# Patient Record
Sex: FEMALE | Race: WHITE | HISPANIC OR LATINO | Employment: FULL TIME | URBAN - METROPOLITAN AREA
[De-identification: names, ages, dates, MRNs, and addresses within clinical notes are randomized per-mention and may not be internally consistent; named-entity substitution may affect disease eponyms.]

---

## 2018-01-20 ENCOUNTER — HOSPITAL ENCOUNTER (INPATIENT)
Facility: HOSPITAL | Age: 48
LOS: 3 days | Discharge: HOME | DRG: 690 | End: 2018-01-23
Attending: EMERGENCY MEDICINE | Admitting: INTERNAL MEDICINE
Payer: COMMERCIAL

## 2018-01-20 ENCOUNTER — APPOINTMENT (EMERGENCY)
Dept: RADIOLOGY | Facility: HOSPITAL | Age: 48
DRG: 690 | End: 2018-01-20
Payer: COMMERCIAL

## 2018-01-20 DIAGNOSIS — Q60.0 SOLITARY KIDNEY, CONGENITAL: ICD-10-CM

## 2018-01-20 DIAGNOSIS — N39.0 UTI (URINARY TRACT INFECTION): ICD-10-CM

## 2018-01-20 DIAGNOSIS — N13.2 URETERAL STONE WITH HYDRONEPHROSIS: ICD-10-CM

## 2018-01-20 DIAGNOSIS — N10 PYELONEPHRITIS, ACUTE: Primary | ICD-10-CM

## 2018-01-20 LAB
ALBUMIN SERPL BCP-MCNC: 3.9 G/DL (ref 3.5–5)
ALP SERPL-CCNC: 71 U/L (ref 46–116)
ALT SERPL W P-5'-P-CCNC: 26 U/L (ref 12–78)
ANION GAP SERPL CALCULATED.3IONS-SCNC: 5 MMOL/L (ref 4–13)
AST SERPL W P-5'-P-CCNC: 11 U/L (ref 5–45)
BACTERIA UR QL AUTO: ABNORMAL /HPF
BASOPHILS # BLD AUTO: 0.03 THOUSANDS/ΜL (ref 0–0.1)
BASOPHILS NFR BLD AUTO: 0 % (ref 0–1)
BILIRUB SERPL-MCNC: 0.26 MG/DL (ref 0.2–1)
BILIRUB UR QL STRIP: NEGATIVE
BUN SERPL-MCNC: 15 MG/DL (ref 5–25)
CALCIUM SERPL-MCNC: 8.9 MG/DL (ref 8.3–10.1)
CHLORIDE SERPL-SCNC: 107 MMOL/L (ref 100–108)
CLARITY UR: CLEAR
CO2 SERPL-SCNC: 26 MMOL/L (ref 21–32)
COLOR UR: YELLOW
COLOR, POC: NORMAL
CREAT SERPL-MCNC: 0.84 MG/DL (ref 0.6–1.3)
EOSINOPHIL # BLD AUTO: 0.5 THOUSAND/ΜL (ref 0–0.61)
EOSINOPHIL NFR BLD AUTO: 4 % (ref 0–6)
ERYTHROCYTE [DISTWIDTH] IN BLOOD BY AUTOMATED COUNT: 13.4 % (ref 11.6–15.1)
GFR SERPL CREATININE-BSD FRML MDRD: 83 ML/MIN/1.73SQ M
GLUCOSE SERPL-MCNC: 106 MG/DL (ref 65–140)
GLUCOSE UR STRIP-MCNC: NEGATIVE MG/DL
HCT VFR BLD AUTO: 42 % (ref 34.8–46.1)
HGB BLD-MCNC: 14.4 G/DL (ref 11.5–15.4)
HGB UR QL STRIP.AUTO: ABNORMAL
HYALINE CASTS #/AREA URNS LPF: ABNORMAL /LPF
KETONES UR STRIP-MCNC: NEGATIVE MG/DL
LEUKOCYTE ESTERASE UR QL STRIP: ABNORMAL
LYMPHOCYTES # BLD AUTO: 1.45 THOUSANDS/ΜL (ref 0.6–4.47)
LYMPHOCYTES NFR BLD AUTO: 12 % (ref 14–44)
MCH RBC QN AUTO: 28.6 PG (ref 26.8–34.3)
MCHC RBC AUTO-ENTMCNC: 34.3 G/DL (ref 31.4–37.4)
MCV RBC AUTO: 83 FL (ref 82–98)
MONOCYTES # BLD AUTO: 0.59 THOUSAND/ΜL (ref 0.17–1.22)
MONOCYTES NFR BLD AUTO: 5 % (ref 4–12)
NEUTROPHILS # BLD AUTO: 9.37 THOUSANDS/ΜL (ref 1.85–7.62)
NEUTS SEG NFR BLD AUTO: 79 % (ref 43–75)
NITRITE UR QL STRIP: NEGATIVE
NON-SQ EPI CELLS URNS QL MICRO: ABNORMAL /HPF
NRBC BLD AUTO-RTO: 0 /100 WBCS
PH UR STRIP.AUTO: 6.5 [PH] (ref 4.5–8)
PLATELET # BLD AUTO: 263 THOUSANDS/UL (ref 149–390)
PMV BLD AUTO: 8.8 FL (ref 8.9–12.7)
POTASSIUM SERPL-SCNC: 4.1 MMOL/L (ref 3.5–5.3)
PROT SERPL-MCNC: 7.7 G/DL (ref 6.4–8.2)
PROT UR STRIP-MCNC: >=300 MG/DL
RBC # BLD AUTO: 5.04 MILLION/UL (ref 3.81–5.12)
RBC #/AREA URNS AUTO: ABNORMAL /HPF
SODIUM SERPL-SCNC: 138 MMOL/L (ref 136–145)
SP GR UR STRIP.AUTO: >=1.03 (ref 1–1.03)
UROBILINOGEN UR QL STRIP.AUTO: 0.2 E.U./DL
WBC # BLD AUTO: 11.97 THOUSAND/UL (ref 4.31–10.16)
WBC #/AREA URNS AUTO: ABNORMAL /HPF

## 2018-01-20 PROCEDURE — 87086 URINE CULTURE/COLONY COUNT: CPT

## 2018-01-20 PROCEDURE — 96361 HYDRATE IV INFUSION ADD-ON: CPT

## 2018-01-20 PROCEDURE — 87186 SC STD MICRODIL/AGAR DIL: CPT

## 2018-01-20 PROCEDURE — 85025 COMPLETE CBC W/AUTO DIFF WBC: CPT | Performed by: EMERGENCY MEDICINE

## 2018-01-20 PROCEDURE — 99285 EMERGENCY DEPT VISIT HI MDM: CPT

## 2018-01-20 PROCEDURE — 96365 THER/PROPH/DIAG IV INF INIT: CPT

## 2018-01-20 PROCEDURE — 74176 CT ABD & PELVIS W/O CONTRAST: CPT

## 2018-01-20 PROCEDURE — 96375 TX/PRO/DX INJ NEW DRUG ADDON: CPT

## 2018-01-20 PROCEDURE — 80053 COMPREHEN METABOLIC PANEL: CPT | Performed by: EMERGENCY MEDICINE

## 2018-01-20 PROCEDURE — 81001 URINALYSIS AUTO W/SCOPE: CPT

## 2018-01-20 PROCEDURE — 81002 URINALYSIS NONAUTO W/O SCOPE: CPT | Performed by: EMERGENCY MEDICINE

## 2018-01-20 PROCEDURE — 36415 COLL VENOUS BLD VENIPUNCTURE: CPT | Performed by: EMERGENCY MEDICINE

## 2018-01-20 PROCEDURE — 87077 CULTURE AEROBIC IDENTIFY: CPT

## 2018-01-20 RX ORDER — TAMSULOSIN HYDROCHLORIDE 0.4 MG/1
0.4 CAPSULE ORAL
Status: DISCONTINUED | OUTPATIENT
Start: 2018-01-21 | End: 2018-01-23 | Stop reason: HOSPADM

## 2018-01-20 RX ORDER — MORPHINE SULFATE 2 MG/ML
2 INJECTION, SOLUTION INTRAMUSCULAR; INTRAVENOUS
Status: DISCONTINUED | OUTPATIENT
Start: 2018-01-20 | End: 2018-01-20 | Stop reason: DRUGHIGH

## 2018-01-20 RX ORDER — SODIUM CHLORIDE 9 MG/ML
125 INJECTION, SOLUTION INTRAVENOUS CONTINUOUS
Status: DISCONTINUED | OUTPATIENT
Start: 2018-01-20 | End: 2018-01-21 | Stop reason: SDUPTHER

## 2018-01-20 RX ORDER — ONDANSETRON 2 MG/ML
4 INJECTION INTRAMUSCULAR; INTRAVENOUS ONCE
Status: COMPLETED | OUTPATIENT
Start: 2018-01-20 | End: 2018-01-20

## 2018-01-20 RX ORDER — ONDANSETRON 2 MG/ML
4 INJECTION INTRAMUSCULAR; INTRAVENOUS ONCE AS NEEDED
Status: DISCONTINUED | OUTPATIENT
Start: 2018-01-20 | End: 2018-01-23 | Stop reason: HOSPADM

## 2018-01-20 RX ORDER — ACETAMINOPHEN 325 MG/1
650 TABLET ORAL ONCE
Status: COMPLETED | OUTPATIENT
Start: 2018-01-20 | End: 2018-01-20

## 2018-01-20 RX ORDER — MORPHINE SULFATE 2 MG/ML
2 INJECTION, SOLUTION INTRAMUSCULAR; INTRAVENOUS
Status: DISCONTINUED | OUTPATIENT
Start: 2018-01-20 | End: 2018-01-23

## 2018-01-20 RX ORDER — ACETAMINOPHEN 325 MG/1
650 TABLET ORAL EVERY 6 HOURS PRN
Status: DISCONTINUED | OUTPATIENT
Start: 2018-01-20 | End: 2018-01-23 | Stop reason: HOSPADM

## 2018-01-20 RX ORDER — SODIUM CHLORIDE 9 MG/ML
125 INJECTION, SOLUTION INTRAVENOUS CONTINUOUS
Status: DISCONTINUED | OUTPATIENT
Start: 2018-01-20 | End: 2018-01-20 | Stop reason: SDUPTHER

## 2018-01-20 RX ORDER — ONDANSETRON 2 MG/ML
4 INJECTION INTRAMUSCULAR; INTRAVENOUS EVERY 6 HOURS PRN
Status: DISCONTINUED | OUTPATIENT
Start: 2018-01-20 | End: 2018-01-23 | Stop reason: HOSPADM

## 2018-01-20 RX ADMIN — CEFTRIAXONE SODIUM 1000 MG: 10 INJECTION, POWDER, FOR SOLUTION INTRAVENOUS at 17:45

## 2018-01-20 RX ADMIN — SODIUM CHLORIDE 1000 ML: 0.9 INJECTION, SOLUTION INTRAVENOUS at 16:55

## 2018-01-20 RX ADMIN — SODIUM CHLORIDE 125 ML/HR: 0.9 INJECTION, SOLUTION INTRAVENOUS at 21:59

## 2018-01-20 RX ADMIN — ONDANSETRON 4 MG: 2 INJECTION INTRAMUSCULAR; INTRAVENOUS at 18:03

## 2018-01-20 RX ADMIN — ACETAMINOPHEN 650 MG: 325 TABLET, FILM COATED ORAL at 18:02

## 2018-01-20 NOTE — ASSESSMENT & PLAN NOTE
CT abd showed calculi within the distal right ureter, largest measuring 3 mm resulting in marked hydroureter and hydronephrosis  Urology consult   Aggressive IVF/Pain control/supportive care    NPO for now while waiting for uro input

## 2018-01-20 NOTE — H&P
H&P- Lazarus Hooker 1970, 52 y o  female MRN: [id number]    Unit/Bed#: ED 24 Encounter: [id number]    Primary Care Provider: Ashley Layton MD   Date and time admitted to hospital: 1/20/2018  4:21 PM        * Ureteral stone with hydronephrosis   Assessment & Plan    CT abd showed calculi within the distal right ureter, largest measuring 3 mm resulting in marked hydroureter and hydronephrosis  Urology consult   Aggressive IVF/Pain control/supportive care  NPO for now while waiting for uro input        UTI (urinary tract infection)   Assessment & Plan    With pyuria and bacteruria and urgency  Received  IV ceftriaxone and will continue        Solitary kidney, congenital   Assessment & Plan    Cr normal          VTE Prophylaxis: Pharmacologic VTE Prophylaxis contraindicated due to Pending  decision regarding plan for OR  / sequential compression device   Code Status: Full code  POLST: POLST form is not discussed and not completed at this time  Discussion with family: Family at bedside    Anticipated Length of Stay:  Patient will be admitted on an Inpatient basis with an anticipated length of stay of  > 2 midnights  Justification for Hospital Stay: Above    Total Time for Visit, including Counseling / Coordination of Care: 20 minutes  Greater than 50% of this total time spent on direct patient counseling and coordination of care  Chief Complaint:   Right flank pain    History of Present Illness:    Lazarus Hooker is a 52 y o  female with congenital solitary kidney/absent left kidney who presents with chills and right flank pain  She started noticing urinary urgency yesterday  Today she started  having severe sharp pain right flank area radiating down to right lower abdomen and groin area, associated with nausea and chills  In the ED, CT abdomen showed distal right ureteral stone largest measuring 3 mm with associated marked hydroureter and hydronephrosis    Urine positive for pyuria and bacteriuria  Review of Systems:    Review of Systems   Constitutional: Positive for appetite change, chills and fatigue  Respiratory: Negative for cough, choking, shortness of breath and wheezing  Cardiovascular: Negative for palpitations and leg swelling  Gastrointestinal: Positive for abdominal pain  Genitourinary: Positive for urgency  Past Medical and Surgical History:     Past Medical History:   Diagnosis Date    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       Past Surgical History:   Procedure Laterality Date    HYSTERECTOMY      KNEE SURGERY         Meds/Allergies:    Prior to Admission medications    Not on File     I have reviewed home medications with patient personally  Allergies: Allergies   Allergen Reactions    Metoclopramide     Nitrofurantoin     Diphenhydramine Palpitations       Social History:     Marital Status:   Patient Pre-hospital Living Situation: Home  Patient Pre-hospital Level of Mobility: Ambulatory    History   Alcohol Use No     History   Smoking Status    Never Smoker   Smokeless Tobacco    Never Used     History   Drug Use No       Family History:    History reviewed  No pertinent family history  Physical Exam:     Vitals:   Blood Pressure: 128/74 (01/20/18 1749)  Pulse: 69 (01/20/18 1749)  Temperature: 98 °F (36 7 °C) (01/20/18 1412)  Temp Source: Oral (01/20/18 1412)  Respirations: 18 (01/20/18 1749)  Height: 4' 11" (149 9 cm) (01/20/18 1412)  Weight - Scale: 77 6 kg (171 lb) (01/20/18 1412)  SpO2: 100 % (01/20/18 1749)    Physical Exam   Constitutional: She is oriented to person, place, and time  Mild pain distress   Eyes: Conjunctivae and EOM are normal  Pupils are equal, round, and reactive to light  Neck: Normal range of motion  Neck supple  No JVD present  Abdominal: Soft  Bowel sounds are normal  She exhibits no distension  There is no rebound and no guarding     CVA tenderness right side   Musculoskeletal: Normal range of motion  She exhibits no edema  Neurological: She is alert and oriented to person, place, and time  Skin: Skin is warm and dry  She is not diaphoretic  No erythema  Psychiatric: She has a normal mood and affect  (Additional Data:     Lab Results:       Results from last 7 days  Lab Units 01/20/18  1656   WBC Thousand/uL 11 97*   HEMOGLOBIN g/dL 14 4   HEMATOCRIT % 42 0   PLATELETS Thousands/uL 263   NEUTROS PCT % 79*   LYMPHS PCT % 12*   MONOS PCT % 5   EOS PCT % 4       Results from last 7 days  Lab Units 01/20/18  1656   SODIUM mmol/L 138   POTASSIUM mmol/L 4 1   CHLORIDE mmol/L 107   CO2 mmol/L 26   BUN mg/dL 15   CREATININE mg/dL 0 84   CALCIUM mg/dL 8 9   TOTAL PROTEIN g/dL 7 7   BILIRUBIN TOTAL mg/dL 0 26   ALK PHOS U/L 71   ALT U/L 26   AST U/L 11   GLUCOSE RANDOM mg/dL 106           Imaging:     CT renal stone study abdomen pelvis wo contrast   Final Result by Meg Cisneros MD (01/20 1822)         1  Calculi within the distal right ureter, largest measuring 3 mm resulting in marked hydroureter and hydronephrosis  Multiple intrarenal calculi also present, largest measuring 4 mm and located in the lower pole  2  Congenitally absent left kidney  I personally discussed this study with Mack Alvarez on 1/20/2018 6:19 PM          Workstation performed: [id number]               ** Please Note: This note has been constructed using a voice recognition system   **

## 2018-01-20 NOTE — ED ATTENDING ATTESTATION
Therese Cao MD, saw and evaluated the patient  I have discussed the patient with the resident/non-physician practitioner and agree with the resident's/non-physician practitioner's findings, Plan of Care, and MDM as documented in the resident's/non-physician practitioner's note, except where noted  All available labs and Radiology studies were reviewed  At this point I agree with the current assessment done in the Emergency Department  I have conducted an independent evaluation of this patient a history and physical is as follows:      Critical Care Time  CritCare Time   OA: 51 y/o f with h/o one kidney since birth who presents with R flank pain with associated urinary frequency and darker colored urine  Over the past day, she also developed n/v and pelvic/flank pain  Denies fevers or chills  No change in stools  No chest pain or shortness of breath  Patient does have a surgical history of complete hysterectomy  She has a history of previous UTIs but none recently  She cannot remember she has a history of renal stones  On physical exam patient is well appearing but mildly uncomfortable  Vital signs are stable  HEENT is normocephalic and atraumatic  Anicteric sclera and conjunctiva  Neck is supple  Heart is regular rate  Lungs are clear  Abdomen is soft with positive bowel sounds patient has very mild tenderness to palpation in right upper quadrant as well as right flank  There is no right lower quadrant tenderness to palpation on my exam   There is no rebound no guarding or mass  No lower extremity edema or calf tenderness to palpation  Intact distal pulses  Capillary refill less than 2 seconds  Awake oriented x3  Assessment and plan concern for pyelonephritis may also have kidney stone  Will obtain noncontrast CT  Will obtain basic blood work and urinalysis    If any signs of infection or will start IV fluid hydration as well as IV antibiotics is patient is complicated UTI given her history of congenital 1 kidney  Portions of the record may have been created with voice recognition software  Occasional wrong word or sound-a-like" substitutions may have occurred due to the inherent limitations of voice recognition software  Review chart carefully and recognize, using context, where substitutions have occurred      Procedures

## 2018-01-20 NOTE — ED PROVIDER NOTES
History  Chief Complaint   Patient presents with    Flank Pain     Patient reports she has 1 right kidney, states she started with right flank pain that radiates down her right side starting this morning  HPI  24-year-old female with medical history significant for left renal agenesis and history of kidney stones presents to the emergency department with complaint of right-sided flank pain  Patient states that she began having sharp, stabbing right flank pain with radiation to her right groin this morning  Pain is been intermittent without known modifying factors  Patient denies having had similar pain in the past   On review of systems, she states that she has had dark urine and urinary urgency over the past week  She also reports having had subjective fevers and chills as well as mild nausea without vomiting this morning  She denies any headache, lightheadedness, neck stiffness, chest pain, shortness of breath, abdominal pain, hematuria, dysuria, or complaints other than stated above  Patient does report history of recurrent pyelonephritis many years ago, but states that she typically has obvious urinary symptoms that she does not currently have  None       Past Medical History:   Diagnosis Date    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       Past Surgical History:   Procedure Laterality Date    HYSTERECTOMY      KNEE SURGERY         History reviewed  No pertinent family history  I have reviewed and agree with the history as documented  Social History   Substance Use Topics    Smoking status: Never Smoker    Smokeless tobacco: Never Used    Alcohol use No        Review of Systems   Constitutional: Positive for chills and fever  Respiratory: Negative for shortness of breath  Gastrointestinal: Positive for nausea  Negative for abdominal pain and vomiting  Genitourinary: Positive for flank pain and urgency  Negative for dysuria and hematuria     Musculoskeletal: Negative for arthralgias and joint swelling  Skin: Negative for rash and wound  Allergic/Immunologic: Negative for immunocompromised state  Neurological: Negative for headaches  Psychiatric/Behavioral: The patient is not nervous/anxious  All other systems reviewed and are negative  Physical Exam  ED Triage Vitals [01/20/18 1412]   Temperature Pulse Respirations Blood Pressure SpO2   98 °F (36 7 °C) 95 18 123/89 100 %      Temp Source Heart Rate Source Patient Position - Orthostatic VS BP Location FiO2 (%)   Oral Monitor Sitting Left arm --      Pain Score       9           Orthostatic Vital Signs  Vitals:    01/20/18 1900 01/20/18 2034 01/20/18 2120 01/20/18 2317   BP: 118/56 104/55 124/79 98/53   Pulse: 72 75 86 82   Patient Position - Orthostatic VS:  Lying Sitting Lying       Physical Exam   Constitutional: She is oriented to person, place, and time  She appears well-nourished  No distress  HENT:   Head: Normocephalic and atraumatic  Eyes: EOM are normal    Neck: Normal range of motion  Neck supple  Cardiovascular: Normal rate and regular rhythm  Pulmonary/Chest: Effort normal and breath sounds normal  No respiratory distress  Abdominal: Soft  She exhibits no distension  There is no tenderness  There is CVA tenderness (right)  Musculoskeletal: Normal range of motion  Neurological: She is alert and oriented to person, place, and time  Skin: Skin is warm and dry  She is not diaphoretic  Psychiatric: She has a normal mood and affect  Her behavior is normal    Nursing note and vitals reviewed        ED Medications  Medications   ondansetron (ZOFRAN) injection 4 mg (not administered)   sodium chloride 0 9 % infusion (125 mL/hr Intravenous New Bag 1/20/18 7463)   ondansetron (ZOFRAN) injection 4 mg (not administered)   cefTRIAXone (ROCEPHIN) 1,000 mg in dextrose 5 % 50 mL IVPB (not administered)   morphine injection 2 mg (not administered)   acetaminophen (TYLENOL) tablet 650 mg (not administered)   tamsulosin (FLOMAX) capsule 0 4 mg (not administered)   sodium chloride 0 9 % bolus 1,000 mL (0 mL Intravenous Stopped 1/20/18 1855)   ceftriaxone (ROCEPHIN) 1 g/50 mL in dextrose IVPB (0 mg Intravenous Stopped 1/20/18 1845)   acetaminophen (TYLENOL) tablet 650 mg (650 mg Oral Given 1/20/18 1802)   ondansetron (ZOFRAN) injection 4 mg (4 mg Intravenous Given 1/20/18 1803)       Diagnostic Studies  Results Reviewed     Procedure Component Value Units Date/Time    Comprehensive metabolic panel [[id number]] Collected:  01/20/18 1656    Lab Status:  Final result Specimen:  Blood from Arm, Left Updated:  01/20/18 1721     Sodium 138 mmol/L      Potassium 4 1 mmol/L      Chloride 107 mmol/L      CO2 26 mmol/L      Anion Gap 5 mmol/L      BUN 15 mg/dL      Creatinine 0 84 mg/dL      Glucose 106 mg/dL      Calcium 8 9 mg/dL      AST 11 U/L      ALT 26 U/L      Alkaline Phosphatase 71 U/L      Total Protein 7 7 g/dL      Albumin 3 9 g/dL      Total Bilirubin 0 26 mg/dL      eGFR 83 ml/min/1 73sq m     Narrative:         National Kidney Disease Education Program recommendations are as follows:  GFR calculation is accurate only with a steady state creatinine  Chronic Kidney disease less than 60 ml/min/1 73 sq  meters  Kidney failure less than 15 ml/min/1 73 sq  meters      CBC and differential [[id number]]  (Abnormal) Collected:  01/20/18 1656    Lab Status:  Final result Specimen:  Blood from Arm, Left Updated:  01/20/18 1710     WBC 11 97 (H) Thousand/uL      RBC 5 04 Million/uL      Hemoglobin 14 4 g/dL      Hematocrit 42 0 %      MCV 83 fL      MCH 28 6 pg      MCHC 34 3 g/dL      RDW 13 4 %      MPV 8 8 (L) fL      Platelets 702 Thousands/uL      nRBC 0 /100 WBCs      Neutrophils Relative 79 (H) %      Lymphocytes Relative 12 (L) %      Monocytes Relative 5 %      Eosinophils Relative 4 %      Basophils Relative 0 %      Neutrophils Absolute 9 37 (H) Thousands/µL      Lymphocytes Absolute 1 45 Thousands/µL Monocytes Absolute 0 59 Thousand/µL      Eosinophils Absolute 0 50 Thousand/µL      Basophils Absolute 0 03 Thousands/µL     POCT urinalysis dipstick [[id number]]  (Normal) Resulted:  01/20/18 1706    Lab Status:  Final result Updated:  01/20/18 1706     Color, UA see chart from triage    Urine Microscopic [[id number]]  (Abnormal) Collected:  01/20/18 1457    Lab Status:  Final result Specimen:  Urine from Urine, Clean Catch Updated:  01/20/18 1508     RBC, UA Innumerable (A) /hpf      WBC, UA Innumerable (A) /hpf      Epithelial Cells None Seen /hpf      Bacteria, UA Innumerable (A) /hpf      Hyaline Casts, UA 25-50 (A) /lpf     Urine culture [[id number]] Collected:  01/20/18 1457    Lab Status: In process Specimen:  Urine from Urine, Clean Catch Updated:  01/20/18 1508    ED Urine Macroscopic [[id number]]  (Abnormal) Collected:  01/20/18 1457    Lab Status:  Final result Specimen:  Urine Updated:  01/20/18 1454     Color, UA Yellow     Clarity, UA Clear     pH, UA 6 5     Leukocytes, UA Moderate (A)     Nitrite, UA Negative     Protein, UA >=300 (A) mg/dl      Glucose, UA Negative mg/dl      Ketones, UA Negative mg/dl      Urobilinogen, UA 0 2 E U /dl      Bilirubin, UA Negative     Blood, UA Moderate (A)     Specific Gravity, UA >=1 030    Narrative:       CLINITEK RESULT    POCT pregnancy, urine [[id number]]     Lab Status:  No result                  CT renal stone study abdomen pelvis wo contrast   Final Result by Zelalem Engel MD (01/20 1822)         1  Calculi within the distal right ureter, largest measuring 3 mm resulting in marked hydroureter and hydronephrosis  Multiple intrarenal calculi also present, largest measuring 4 mm and located in the lower pole  2  Congenitally absent left kidney                   I personally discussed this study with Morris Moreno on 1/20/2018 6:19 PM          Workstation performed: [id number]               Procedures  Procedures      Phone Consults  ED Phone Contact    ED Course  ED Course as of Jan 21 0154   Sat Jan 20, 2018   1830 On the line with Urology - keep NPO  Possible OR tomorrow  Call overnight for any new/worsening symptoms  Patient aware of all results and plans  MDM  Number of Diagnoses or Management Options  Pyelonephritis, acute:   Ureteral stone with hydronephrosis:   UTI (urinary tract infection):   Diagnosis management comments: 42-year-old female with 1 kidney presenting with right flank pain  UA positive for bacteria, nitrate  Will obtain CBC, BMP, stone study  Plan for admission for IV antibiotics  Update: Patient has obstructive stone  Urology consulted       CritCare Time    Disposition  Final diagnoses:   Ureteral stone with hydronephrosis   UTI (urinary tract infection)   Pyelonephritis, acute     Time reflects when diagnosis was documented in both MDM as applicable and the Disposition within this note     Time User Action Codes Description Comment    1/20/2018  6:50 PM Willma Citrin Add [N13 2] Ureteral stone with hydronephrosis     1/20/2018  6:50 PM Zwmaitebel Rebeca Modify [N13 2] Ureteral stone with hydronephrosis     1/20/2018  6:50 PM Zwmaitebel Rebeca Modify [N13 2] Ureteral stone with hydronephrosis     1/20/2018  6:50 PM Zwmaitebel Rebeca Add [N39 0] UTI (urinary tract infection)     1/20/2018  6:50 PM Zwmaitebel Rebeca Modify [N39 0] UTI (urinary tract infection)     1/20/2018  6:51 PM Zwmaitebel Rebeca Add [N10] Pyelonephritis, acute     1/20/2018  6:51 PM Himabel Rebeca Modify [N13 2] Ureteral stone with hydronephrosis     1/20/2018  6:51 PM Zwmaitebel Rebeca Modify [N10] Pyelonephritis, acute     1/20/2018  6:52 PM Tali [place] Add [Q60 0] Solitary kidney, congenital     1/20/2018  6:52 PM Tali [place] Modify [Q60 0] Solitary kidney, congenital       ED Disposition     ED Disposition Condition Comment    Admit  Case was discussed with Dr Josr Spain and the patient's admission status was agreed to be Admission Status: inpatient status to the service of Dr Rebecca Bolanos   Follow-up Information    None       There are no discharge medications for this patient  No discharge procedures on file  ED Provider  Attending physically available and evaluated Raquel Conway I managed the patient along with the ED Attending      Electronically Signed by         Wilner Fulton MD  01/21/18 2034

## 2018-01-21 ENCOUNTER — ANESTHESIA EVENT (INPATIENT)
Dept: PERIOP | Facility: HOSPITAL | Age: 48
DRG: 690 | End: 2018-01-21
Payer: COMMERCIAL

## 2018-01-21 ENCOUNTER — APPOINTMENT (INPATIENT)
Dept: RADIOLOGY | Facility: HOSPITAL | Age: 48
DRG: 690 | End: 2018-01-21
Payer: COMMERCIAL

## 2018-01-21 ENCOUNTER — ANESTHESIA (INPATIENT)
Dept: PERIOP | Facility: HOSPITAL | Age: 48
DRG: 690 | End: 2018-01-21
Payer: COMMERCIAL

## 2018-01-21 LAB
ANION GAP SERPL CALCULATED.3IONS-SCNC: 6 MMOL/L (ref 4–13)
BUN SERPL-MCNC: 10 MG/DL (ref 5–25)
CALCIUM SERPL-MCNC: 8 MG/DL (ref 8.3–10.1)
CHLORIDE SERPL-SCNC: 113 MMOL/L (ref 100–108)
CO2 SERPL-SCNC: 24 MMOL/L (ref 21–32)
CREAT SERPL-MCNC: 0.79 MG/DL (ref 0.6–1.3)
ERYTHROCYTE [DISTWIDTH] IN BLOOD BY AUTOMATED COUNT: 13.3 % (ref 11.6–15.1)
GFR SERPL CREATININE-BSD FRML MDRD: 89 ML/MIN/1.73SQ M
GLUCOSE SERPL-MCNC: 89 MG/DL (ref 65–140)
GLUCOSE SERPL-MCNC: 97 MG/DL (ref 65–140)
HCT VFR BLD AUTO: 39.5 % (ref 34.8–46.1)
HGB BLD-MCNC: 13.1 G/DL (ref 11.5–15.4)
MCH RBC QN AUTO: 27.8 PG (ref 26.8–34.3)
MCHC RBC AUTO-ENTMCNC: 33.2 G/DL (ref 31.4–37.4)
MCV RBC AUTO: 84 FL (ref 82–98)
PLATELET # BLD AUTO: 247 THOUSANDS/UL (ref 149–390)
PMV BLD AUTO: 9 FL (ref 8.9–12.7)
POTASSIUM SERPL-SCNC: 3.6 MMOL/L (ref 3.5–5.3)
RBC # BLD AUTO: 4.71 MILLION/UL (ref 3.81–5.12)
SODIUM SERPL-SCNC: 143 MMOL/L (ref 136–145)
WBC # BLD AUTO: 8.29 THOUSAND/UL (ref 4.31–10.16)

## 2018-01-21 PROCEDURE — BT14YZZ FLUOROSCOPY OF KIDNEYS, URETERS AND BLADDER USING OTHER CONTRAST: ICD-10-PCS | Performed by: UROLOGY

## 2018-01-21 PROCEDURE — C2617 STENT, NON-COR, TEM W/O DEL: HCPCS | Performed by: UROLOGY

## 2018-01-21 PROCEDURE — 74420 UROGRAPHY RTRGR +-KUB: CPT

## 2018-01-21 PROCEDURE — 80048 BASIC METABOLIC PNL TOTAL CA: CPT | Performed by: INTERNAL MEDICINE

## 2018-01-21 PROCEDURE — 0T768DZ DILATION OF RIGHT URETER WITH INTRALUMINAL DEVICE, VIA NATURAL OR ARTIFICIAL OPENING ENDOSCOPIC: ICD-10-PCS | Performed by: UROLOGY

## 2018-01-21 PROCEDURE — C1769 GUIDE WIRE: HCPCS | Performed by: UROLOGY

## 2018-01-21 PROCEDURE — 82948 REAGENT STRIP/BLOOD GLUCOSE: CPT

## 2018-01-21 PROCEDURE — 85027 COMPLETE CBC AUTOMATED: CPT | Performed by: INTERNAL MEDICINE

## 2018-01-21 DEVICE — STENT URETERAL 6FR 24CML INLAY OPTIMA: Type: IMPLANTABLE DEVICE | Site: URETER | Status: FUNCTIONAL

## 2018-01-21 RX ORDER — PROPOFOL 10 MG/ML
INJECTION, EMULSION INTRAVENOUS AS NEEDED
Status: DISCONTINUED | OUTPATIENT
Start: 2018-01-21 | End: 2018-01-21 | Stop reason: SURG

## 2018-01-21 RX ORDER — KETOROLAC TROMETHAMINE 30 MG/ML
15 INJECTION, SOLUTION INTRAMUSCULAR; INTRAVENOUS EVERY 6 HOURS
Status: DISCONTINUED | OUTPATIENT
Start: 2018-01-21 | End: 2018-01-21

## 2018-01-21 RX ORDER — OXYCODONE HYDROCHLORIDE 5 MG/1
5 TABLET ORAL EVERY 4 HOURS PRN
Status: DISCONTINUED | OUTPATIENT
Start: 2018-01-21 | End: 2018-01-21

## 2018-01-21 RX ORDER — FENTANYL CITRATE 50 UG/ML
INJECTION, SOLUTION INTRAMUSCULAR; INTRAVENOUS AS NEEDED
Status: DISCONTINUED | OUTPATIENT
Start: 2018-01-21 | End: 2018-01-21 | Stop reason: SURG

## 2018-01-21 RX ORDER — MORPHINE SULFATE 2 MG/ML
2 INJECTION, SOLUTION INTRAMUSCULAR; INTRAVENOUS
Status: DISCONTINUED | OUTPATIENT
Start: 2018-01-21 | End: 2018-01-22

## 2018-01-21 RX ORDER — OXYCODONE HYDROCHLORIDE 5 MG/1
5 TABLET ORAL EVERY 4 HOURS PRN
Status: DISCONTINUED | OUTPATIENT
Start: 2018-01-21 | End: 2018-01-23

## 2018-01-21 RX ORDER — KETOROLAC TROMETHAMINE 30 MG/ML
INJECTION, SOLUTION INTRAMUSCULAR; INTRAVENOUS AS NEEDED
Status: DISCONTINUED | OUTPATIENT
Start: 2018-01-21 | End: 2018-01-21 | Stop reason: SURG

## 2018-01-21 RX ORDER — MAGNESIUM HYDROXIDE 1200 MG/15ML
LIQUID ORAL AS NEEDED
Status: DISCONTINUED | OUTPATIENT
Start: 2018-01-21 | End: 2018-01-21 | Stop reason: HOSPADM

## 2018-01-21 RX ORDER — PROMETHAZINE HYDROCHLORIDE 25 MG/ML
25 INJECTION, SOLUTION INTRAMUSCULAR; INTRAVENOUS ONCE AS NEEDED
Status: DISCONTINUED | OUTPATIENT
Start: 2018-01-21 | End: 2018-01-21 | Stop reason: HOSPADM

## 2018-01-21 RX ORDER — ONDANSETRON 2 MG/ML
INJECTION INTRAMUSCULAR; INTRAVENOUS AS NEEDED
Status: DISCONTINUED | OUTPATIENT
Start: 2018-01-21 | End: 2018-01-21 | Stop reason: SURG

## 2018-01-21 RX ORDER — MIDAZOLAM HYDROCHLORIDE 1 MG/ML
INJECTION INTRAMUSCULAR; INTRAVENOUS AS NEEDED
Status: DISCONTINUED | OUTPATIENT
Start: 2018-01-21 | End: 2018-01-21 | Stop reason: SURG

## 2018-01-21 RX ORDER — FENTANYL CITRATE/PF 50 MCG/ML
25 SYRINGE (ML) INJECTION AS NEEDED
Status: DISCONTINUED | OUTPATIENT
Start: 2018-01-21 | End: 2018-01-21 | Stop reason: HOSPADM

## 2018-01-21 RX ORDER — CIPROFLOXACIN 500 MG/1
500 TABLET, FILM COATED ORAL EVERY 12 HOURS SCHEDULED
Status: DISCONTINUED | OUTPATIENT
Start: 2018-01-21 | End: 2018-01-21

## 2018-01-21 RX ORDER — ONDANSETRON 2 MG/ML
4 INJECTION INTRAMUSCULAR; INTRAVENOUS ONCE AS NEEDED
Status: DISCONTINUED | OUTPATIENT
Start: 2018-01-21 | End: 2018-01-21 | Stop reason: HOSPADM

## 2018-01-21 RX ORDER — SODIUM CHLORIDE 9 MG/ML
100 INJECTION, SOLUTION INTRAVENOUS CONTINUOUS
Status: DISCONTINUED | OUTPATIENT
Start: 2018-01-21 | End: 2018-01-23 | Stop reason: HOSPADM

## 2018-01-21 RX ORDER — KETOROLAC TROMETHAMINE 30 MG/ML
15 INJECTION, SOLUTION INTRAMUSCULAR; INTRAVENOUS EVERY 6 HOURS SCHEDULED
Status: DISCONTINUED | OUTPATIENT
Start: 2018-01-21 | End: 2018-01-21

## 2018-01-21 RX ADMIN — KETOROLAC TROMETHAMINE 15 MG: 30 INJECTION, SOLUTION INTRAMUSCULAR at 14:43

## 2018-01-21 RX ADMIN — DEXAMETHASONE SODIUM PHOSPHATE 10 MG: 10 INJECTION INTRAMUSCULAR; INTRAVENOUS at 09:25

## 2018-01-21 RX ADMIN — MIDAZOLAM HYDROCHLORIDE 2 MG: 1 INJECTION, SOLUTION INTRAMUSCULAR; INTRAVENOUS at 09:18

## 2018-01-21 RX ADMIN — FENTANYL CITRATE 50 MCG: 50 INJECTION, SOLUTION INTRAMUSCULAR; INTRAVENOUS at 09:33

## 2018-01-21 RX ADMIN — ACETAMINOPHEN 650 MG: 325 TABLET, FILM COATED ORAL at 21:31

## 2018-01-21 RX ADMIN — CEFTRIAXONE SODIUM 1000 MG: 10 INJECTION, POWDER, FOR SOLUTION INTRAVENOUS at 09:18

## 2018-01-21 RX ADMIN — SODIUM CHLORIDE 50 ML/HR: 0.9 INJECTION, SOLUTION INTRAVENOUS at 13:20

## 2018-01-21 RX ADMIN — KETOROLAC TROMETHAMINE 30 MG: 30 INJECTION, SOLUTION INTRAMUSCULAR at 10:04

## 2018-01-21 RX ADMIN — SODIUM CHLORIDE 125 ML/HR: 0.9 INJECTION, SOLUTION INTRAVENOUS at 06:14

## 2018-01-21 RX ADMIN — CEFTRIAXONE SODIUM 1000 MG: 10 INJECTION, POWDER, FOR SOLUTION INTRAVENOUS at 17:15

## 2018-01-21 RX ADMIN — ACETAMINOPHEN 650 MG: 325 TABLET, FILM COATED ORAL at 14:41

## 2018-01-21 RX ADMIN — FENTANYL CITRATE 50 MCG: 50 INJECTION, SOLUTION INTRAMUSCULAR; INTRAVENOUS at 09:28

## 2018-01-21 RX ADMIN — PROPOFOL 200 MG: 10 INJECTION, EMULSION INTRAVENOUS at 09:23

## 2018-01-21 RX ADMIN — TAMSULOSIN HYDROCHLORIDE 0.4 MG: 0.4 CAPSULE ORAL at 17:09

## 2018-01-21 RX ADMIN — ONDANSETRON 4 MG: 2 INJECTION INTRAMUSCULAR; INTRAVENOUS at 10:04

## 2018-01-21 NOTE — PROGRESS NOTES
Progress Note - Amie Left 1970, 52 y o  female MRN: [id number]    Unit/Bed#: -01 Encounter: [id number]    Primary Care Provider: Margi Pate MD   Date and time admitted to hospital: 1/20/2018  4:21 PM        * Ureteral stone with hydronephrosis   Assessment & Plan    CT abd showed calculi within the distal right ureter, largest measuring 3 mm resulting in marked hydroureter and hydronephrosis  S/p ureteral stent placement  Labs reviewed unremarkable  Plan   C/w antimicrobial therapy  C/w pain control, IVF  F/u urology        UTI (urinary tract infection)   Assessment & Plan    With pyuria and bacteruria and urgency  Preliminary cx finding significant for gram neg rods  POD 0- s/p ureteral stent  Reports lower back pain, otherwise improvement in urgency  Received  IV ceftriaxone, continue med   F/u final Cx             Solitary kidney, congenital   Assessment & Plan    Cr normal  See above mgt          VTE Pharmacologic Prophylaxis:   Pharmacologic: Pharmacologic VTE Prophylaxis contraindicated due to ureteral stent placed today  Mechanical VTE Prophylaxis in Place: Pharmacologic VTE Prophylaxis contraindicated due to s/p ureteral stent placement    Patient Centered Rounds: I have performed bedside rounds with nursing staff today  Discussions with Specialists or Other Care Team Provider: yes    Education and Discussions with Family / Patient: yes    Time Spent for Care: 45 minutes  More than 50% of total time spent on counseling and coordination of care as described above  Current Length of Stay: 1 day(s)    Current Patient Status: Inpatient   Certification Statement: The patient will continue to require additional inpatient hospital stay due to urological intervention tomorrow    Discharge Plan:  Home 1/23    Code Status: Level 1 - Full Code      Subjective:   Improvement in urgency/fever/chills  Mild lower back pain reported controlled with meds        Objective:     Vitals:   Temp (24hrs), Av 8 °F (36 6 °C), Min:97 4 °F (36 3 °C), Max:98 2 °F (36 8 °C)    HR:  [62-86] 64  Resp:  [15-20] 20  BP: ()/(53-80) 118/70  SpO2:  [95 %-100 %] 96 %  Body mass index is 34 9 kg/m²  Input and Output Summary (last 24 hours): Intake/Output Summary (Last 24 hours) at 18 1601  Last data filed at 18 1300   Gross per 24 hour   Intake             1987 ml   Output             1300 ml   Net              687 ml       Physical Exam:     Physical Exam   Constitutional: She is oriented to person, place, and time  She appears well-developed and well-nourished  HENT:   Head: Normocephalic and atraumatic  Eyes: Conjunctivae and EOM are normal  Pupils are equal, round, and reactive to light  No scleral icterus  Neck: Neck supple  No JVD present  Cardiovascular: Normal rate  No murmur heard  Pulmonary/Chest: Effort normal and breath sounds normal  No respiratory distress  She has no wheezes  Abdominal: Soft  Bowel sounds are normal  She exhibits no distension  There is no tenderness  No cvat   Musculoskeletal: Normal range of motion  She exhibits no edema or tenderness  Lymphadenopathy:     She has no cervical adenopathy  Neurological: She is alert and oriented to person, place, and time  She has normal reflexes  No cranial nerve deficit  Skin: Skin is warm and dry  No rash noted  No erythema  Psychiatric: She has a normal mood and affect   Her behavior is normal  Judgment and thought content normal        Additional Data:     Labs:      Results from last 7 days  Lab Units 18  0506 18  1656   WBC Thousand/uL 8 29 11 97*   HEMOGLOBIN g/dL 13 1 14 4   HEMATOCRIT % 39 5 42 0   PLATELETS Thousands/uL 247 263   NEUTROS PCT %  --  79*   LYMPHS PCT %  --  12*   MONOS PCT %  --  5   EOS PCT %  --  4       Results from last 7 days  Lab Units 18  0506 18  1656   SODIUM mmol/L 143 138   POTASSIUM mmol/L 3 6 4 1   CHLORIDE mmol/L 113* 107   CO2 mmol/L 24 26 BUN mg/dL 10 15   CREATININE mg/dL 0 79 0 84   CALCIUM mg/dL 8 0* 8 9   TOTAL PROTEIN g/dL  --  7 7   BILIRUBIN TOTAL mg/dL  --  0 26   ALK PHOS U/L  --  71   ALT U/L  --  26   AST U/L  --  11   GLUCOSE RANDOM mg/dL 89 106           * I Have Reviewed All Lab Data Listed Above  * Additional Pertinent Lab Tests Reviewed: Patrica 66 Admission Reviewed    Imaging: yes    Recent Cultures (last 7 days):       Results from last 7 days  Lab Units 01/20/18  1457   URINE CULTURE  80,000-89,000 cfu/ml Non lactose fermenting gram negative slava*       Last 24 Hours Medication List:     cefTRIAXone 1,000 mg Intravenous Q24H   ketorolac 15 mg Intravenous Q6H   tamsulosin 0 4 mg Oral Daily With Dinner        Today, Patient Was Seen By: Rina Waite MD    ** Please Note: Dictation voice to text software may have been used in the creation of this document   **

## 2018-01-21 NOTE — PROGRESS NOTES
Progress Note - [facility] 1970, 52 y o  female MRN: [id number]    Unit/Bed#: -01 Encounter: [id number]    Primary Care Provider: Uma Breen MD   Date and time admitted to hospital: 2018  4:21 PM        Solitary kidney, congenital   Assessment & Plan    Cr normal  See above mgt        UTI (urinary tract infection)   Assessment & Plan    With pyuria and bacteruria and urgency  POD 0- s/p ureteral stent  Reports lower back pain, otherwise improvement in urgency  Received  IV ceftriaxone   Continue antimicrobial therapy with cipro 400mg po bid for  7 days        * Ureteral stone with hydronephrosis   Assessment & Plan    CT abd showed calculi within the distal right ureter, largest measuring 3 mm resulting in marked hydroureter and hydronephrosis  S/p ureteral stent placement  Labs reviewed unremarkable  Plan   C/w antimicrobial therapy  C/w pain control, IVF  F/u urology            VTE Pharmacologic Prophylaxis:   Pharmacologic: Pharmacologic VTE Prophylaxis contraindicated due to recent stent placement today  Mechanical VTE Prophylaxis in Place: Yes    Patient Centered Rounds: I have performed bedside rounds with nursing staff today  Discussions with Specialists or Other Care Team Provider: yes    Education and Discussions with Family / Patient: yes    Time Spent for Care: 45 minutes  More than 50% of total time spent on counseling and coordination of care as described above      Current Length of Stay: 1 day(s)    Current Patient Status: Inpatient   Certification Statement: The patient will continue to require additional inpatient hospital stay due to urological intervention    Discharge Plan: home     Code Status: Level 1 - Full Code      Subjective:   Right flank pain/urgency/fever/chills improved  Mild lower back ache in midline 510, postoperatively, where she feels it could be stent related  Otherwise negative ROS      Objective:     Vitals:   Temp (24hrs), Av 8 °F (36 6 °C), Min:97 4 °F (36 3 °C), Max:98 2 °F (36 8 °C)    HR:  [62-86] 64  Resp:  [15-20] 20  BP: ()/(53-80) 118/70  SpO2:  [95 %-100 %] 96 %  Body mass index is 34 9 kg/m²  Input and Output Summary (last 24 hours): Intake/Output Summary (Last 24 hours) at 01/21/18 1555  Last data filed at 01/21/18 1300   Gross per 24 hour   Intake             1987 ml   Output             1300 ml   Net              687 ml       Physical Exam:     Physical Exam   Constitutional: She is oriented to person, place, and time  She appears well-developed and well-nourished  No distress  HENT:   Head: Normocephalic and atraumatic  Mouth/Throat: Oropharynx is clear and moist    Eyes: Conjunctivae and EOM are normal  Pupils are equal, round, and reactive to light  Neck: Normal range of motion  Neck supple  No JVD present  Cardiovascular: Normal rate and normal heart sounds  Pulmonary/Chest: Breath sounds normal  No respiratory distress  She has no wheezes  Abdominal: Soft  Bowel sounds are normal  She exhibits no distension  There is no tenderness  Musculoskeletal: Normal range of motion  She exhibits no edema, tenderness or deformity  Lymphadenopathy:     She has no cervical adenopathy  Neurological: She is alert and oriented to person, place, and time  She has normal reflexes  She displays normal reflexes  No cranial nerve deficit  Coordination normal    Skin: Skin is warm and dry  No rash noted  No erythema  Psychiatric: She has a normal mood and affect   Her behavior is normal  Judgment and thought content normal        Additional Data:     Labs:      Results from last 7 days  Lab Units 01/21/18  0506 01/20/18  1656   WBC Thousand/uL 8 29 11 97*   HEMOGLOBIN g/dL 13 1 14 4   HEMATOCRIT % 39 5 42 0   PLATELETS Thousands/uL 247 263   NEUTROS PCT %  --  79*   LYMPHS PCT %  --  12*   MONOS PCT %  --  5   EOS PCT %  --  4       Results from last 7 days  Lab Units 01/21/18  0506 01/20/18  1656   SODIUM mmol/L 143 138   POTASSIUM mmol/L 3 6 4 1   CHLORIDE mmol/L 113* 107   CO2 mmol/L 24 26   BUN mg/dL 10 15   CREATININE mg/dL 0 79 0 84   CALCIUM mg/dL 8 0* 8 9   TOTAL PROTEIN g/dL  --  7 7   BILIRUBIN TOTAL mg/dL  --  0 26   ALK PHOS U/L  --  71   ALT U/L  --  26   AST U/L  --  11   GLUCOSE RANDOM mg/dL 89 106           * I Have Reviewed All Lab Data Listed Above  * Additional Pertinent Lab Tests Reviewed: Patrica 66 Admission Reviewed    Imaging:reviewed    Recent Cultures (last 7 days):       Results from last 7 days  Lab Units 01/20/18  1457   URINE CULTURE  80,000-89,000 cfu/ml Non lactose fermenting gram negative slava*       Last 24 Hours Medication List:     cefTRIAXone 1,000 mg Intravenous Q24H   ketorolac 15 mg Intravenous Q6H   tamsulosin 0 4 mg Oral Daily With Dinner        Today, Patient Was Seen By: Marty Marcelo MD    ** Please Note: Dictation voice to text software may have been used in the creation of this document   **       H

## 2018-01-21 NOTE — SOCIAL WORK
CM met with patient  Patient lives with  Yon Rivera [phone] and grown children in [facility] home  3-4 LAST  Pt was independent with ADLs PTA  No DME  No history of HHC or STR  Preferred Rx [facility] [place]  No MH or D/A issues reported  No POA  CM reviewed d/c planning process including the following: identifying help at home, patient preference for d/c planning needs, Discharge Lounge, [facility] Meds to Bed program, availability of treatment team to discuss questions or concerns patient and/or family may have regarding understanding medications and recognizing signs and symptoms once discharged  CM also encouraged patient to follow up with all recommended appointments after discharge  Patient advised of importance for patient and family to participate in managing patients medical well being

## 2018-01-21 NOTE — CONSULTS
Consultation - Urology   Ratna Chacon 52 y o  female MRN: [id number]  Unit/Bed#: ED 24 Encounter: [id number] 1/20/2018           Assessment/Plan      Assessment:  1)Right distal ureteral stone with moderate hydronephrosis  2)Nonobstructing right renal calculi  3)Solitary right kidney  4)Possible urinary tract infection    Plan:  Patient has been admitted to the medical service  Agree with antibiotics and present therapy  We will proceed with cystoscopy,  right retrograde pyelography and right stent placement tomorrow  I described the procedure in detail to the patient tonight  Risks,  benefits and alternatives were discussed  The consent form was signed  HPI- 20-year-old female with remote history of kidney stones who developed severe right flank pain today  This prompted her visit to the emergency room  She was found to have a 3 mm distal right ureteral stone causing hydronephrosis of a solitary kidney  In retrospect the patient notes she has been having more frequency and urgency recently  She denies fever chills nausea or vomiting  There is no gross hematuria  Since receiving antibiotic and hydration in the emergency room she is feeling better  Urology has been consulted for further evaluation and therapy  Attending: Bonita Aguillon MD            Review of Systems   Constitutional: Negative for chills, diaphoresis, fatigue and fever  HENT: Negative  Eyes: Negative  Respiratory: Negative  Cardiovascular: Negative  Gastrointestinal: Negative  Endocrine: Negative  Genitourinary: Positive for flank pain, frequency and urgency  Negative for dysuria and hematuria  Skin: Negative  Allergic/Immunologic: Negative  Neurological: Negative  Hematological: Negative  Psychiatric/Behavioral: Negative          Historical Information   Allergies   Allergen Reactions    Metoclopramide     Nitrofurantoin     Diphenhydramine Palpitations     Past Medical History:   Diagnosis Date    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     Past Surgical History:   Procedure Laterality Date    HYSTERECTOMY      KNEE SURGERY       Social History   History   Alcohol Use No     History   Drug Use No     History   Smoking Status    Never Smoker   Smokeless Tobacco    Never Used     Family History: non-contributory    Meds/Allergies   all current active meds have been reviewed  No current facility-administered medications for this encounter  No current outpatient prescriptions on file  No current facility-administered medications for this encounter  Objective   Vitals: Blood pressure 104/55, pulse 75, temperature 98 °F (36 7 °C), temperature source Oral, resp  rate 18, height 4' 11" (1 499 m), weight 77 6 kg (171 lb), SpO2 97 %  I/O last 24 hours: In: 1050 [IV Piggyback:1050]  Out: -     Invasive Devices     Peripheral Intravenous Line            Peripheral IV 01/20/18 Left Antecubital less than 1 day                Physical Exam   Constitutional: She is oriented to person, place, and time  She appears well-developed and well-nourished  HENT:   Head: Normocephalic and atraumatic  Eyes: Conjunctivae are normal  Pupils are equal, round, and reactive to light  Neck: Normal range of motion  Cardiovascular: Normal rate  Pulmonary/Chest: Effort normal    Abdominal: Soft  She exhibits no distension and no mass  There is no tenderness  There is no rebound and no guarding  Mild RCVAT   Musculoskeletal: Normal range of motion  Neurological: She is alert and oriented to person, place, and time  She has normal reflexes  Skin: Skin is warm and dry  Psychiatric: She has a normal mood and affect  Her behavior is normal  Judgment and thought content normal        Lab Results:   I have personally reviewed pertinent reports      CBC:   Lab Results   Component Value Date    WBC 11 97 (H) 01/20/2018    HGB 14 4 01/20/2018    HCT 42 0 01/20/2018    MCV 83 01/20/2018  01/20/2018    MCH 28 6 01/20/2018    MCHC 34 3 01/20/2018    RDW 13 4 01/20/2018    MPV 8 8 (L) 01/20/2018    NRBC 0 01/20/2018     CMP:   Lab Results   Component Value Date     01/20/2018     01/20/2018    CO2 26 01/20/2018    ANIONGAP 5 01/20/2018    BUN 15 01/20/2018    CREATININE 0 84 01/20/2018    GLUCOSE 106 01/20/2018    CALCIUM 8 9 01/20/2018    AST 11 01/20/2018    ALT 26 01/20/2018    ALKPHOS 71 01/20/2018    PROT 7 7 01/20/2018    ALBUMIN 3 9 01/20/2018    BILITOT 0 26 01/20/2018    EGFR 83 01/20/2018     Urinalysis:   Lab Results   Component Value Date    COLORU see chart from triage 01/20/2018    COLORU Yellow 01/20/2018    CLARITYU Clear 01/20/2018    SPECGRAV >=1 030 01/20/2018    PHUR 6 5 01/20/2018    LEUKOCYTESUR Moderate (A) 01/20/2018    NITRITE Negative 01/20/2018    PROTEINUA >=300 (A) 01/20/2018    GLUCOSEU Negative 01/20/2018    KETONESU Negative 01/20/2018    BILIRUBINUR Negative 01/20/2018    BLOODU Moderate (A) 01/20/2018       Imaging Studies: I have personally reviewed pertinent films in PACS  Ct Renal Stone Study Abdomen Pelvis Wo Contrast    Result Date: 1/20/2018  Narrative: CT ABDOMEN AND PELVIS WITHOUT IV CONTRAST - LOW DOSE RENAL STONE INDICATION: Right upper quadrant pain, flank pain COMPARISON: 1/21/2006  TECHNIQUE:  Low dose thin section CT examination of the abdomen and pelvis was performed without intravenous or oral contrast according to a protocol specifically designed to evaluate for urinary tract calculus  Reformatted images were created in axial,  sagittal, and coronal planes  Evaluation for pathology in the abdomen and pelvis that is unrelated to urinary tract calculi is limited  Radiation dose length product (DLP) for this visit:  545 68 mGy-cm     This examination, like all CT scans performed in the [place], was performed utilizing techniques to minimize radiation dose exposure, including the use of iterative  reconstruction and automated exposure control  FINDINGS: RIGHT KIDNEY AND URETER: There is a 3 mm calculus within the distal ureter resulting in marked hydroureteronephrosis  Additional intrarenal calculi are also present, largest measuring 4 mm and located in the lower pole  There also appears to be a punctate calculus in the distal ureter just proximal to the ureterovesical junction (best seen on series 601 image 103)  There is marked stranding along the course of the proximal ureter  There is no perinephric collection  LEFT KIDNEY AND URETER: The left kidney is congenitally absent  URINARY BLADDER: Unremarkable  No significant abnormality in the visualized lung bases  Limited low radiation dose noncontrast CT evaluation demonstrates no clinically significant abnormality of liver, spleen, pancreas, or adrenal glands  No calcified gallstones or gallbladder wall thickening noted  No ascites or lymphadenopathy  Colonic diverticula are noted, without evidence to suggest acute diverticulitis  Visualized bowel appears otherwise unremarkable  The appendix is well seen and there is no evidence of acute appendicitis  No acute fracture or destructive osseous lesion is identified  Impression: 1  Calculi within the distal right ureter, largest measuring 3 mm resulting in marked hydroureter and hydronephrosis  Multiple intrarenal calculi also present, largest measuring 4 mm and located in the lower pole  2  Congenitally absent left kidney  I personally discussed this study with Reynaldo Flower on 1/20/2018 6:19 PM  Workstation performed: [id number]       EKG, Pathology, and Other Studies: I have personally reviewed pertinent reports             Princess Martinez MD

## 2018-01-21 NOTE — ASSESSMENT & PLAN NOTE
CT abd showed calculi within the distal right ureter, largest measuring 3 mm resulting in marked hydroureter and hydronephrosis  S/p ureteral stent placement  Labs reviewed unremarkable  Plan   C/w antimicrobial therapy  C/w pain control, IVF  F/u urology

## 2018-01-21 NOTE — ASSESSMENT & PLAN NOTE
With pyuria and bacteruria and urgency  Preliminary cx finding significant for gram neg rods  POD 0- s/p ureteral stent  Reports lower back pain, otherwise improvement in urgency  Received  IV ceftriaxone, continue med   F/u final Cx

## 2018-01-21 NOTE — ANESTHESIA PREPROCEDURE EVALUATION
Review of Systems/Medical History  Patient summary reviewed  Chart reviewed      Cardiovascular  Exercise tolerance: poor,     Pulmonary       GI/Hepatic            Endo/Other     GYN       Hematology   Musculoskeletal       Neurology   Psychology           Physical Exam    Airway    Mallampati score: I  TM Distance: >3 FB  Neck ROM: full     Dental       Cardiovascular      Pulmonary      Other Findings        Anesthesia Plan  ASA Score- 1 Emergent    Anesthesia Type-   Additional Monitors:   Airway Plan: LMA  Plan Factors-    Induction-     Postoperative Plan-     Informed Consent- Anesthetic plan and risks discussed with patient  I personally reviewed this patient with the CRNA  Discussed and agreed on the Anesthesia Plan with the CRNA  Mena Castaneda

## 2018-01-21 NOTE — OP NOTE
OPERATIVE REPORT  PATIENT NAME: Luli Roe    :  1970  MRN: [id number]  Pt Location: BE CYSTO ROOM 01    SURGERY DATE: 2018    Surgeon(s) and Role:     * Huong Elizondo MD - Primary    Preop Diagnosis:  Solitary kidney, congenital [Q60 0]  Ureteral stone with hydronephrosis [N13 2]    Post-Op Diagnosis Codes:     * Solitary kidney, congenital [Q60 0]     * Ureteral stone with hydronephrosis [N13 2]    Procedure(s) (LRB):  CYSTOSCOPY; BILATERAL RETROGRADE PYELOGRAM WITH INSERTION STENT RIGHT URETERAL (Right)    Specimen(s):  * No specimens in log *    Estimated Blood Loss:   Minimal    Drains:       Anesthesia Type:   Choice    Operative Indications:  Solitary kidney, congenital [Q60 0]  Ureteral stone with hydronephrosis [N13 2]      Operative Findings:  Normal bladder and ureteral orifices, inflammation is noted on the trigone  Right retrograde:  Filling defect consistent with stone at level of the pelvic vessels  Mild hydronephrosis above this  No other filling defects  Final images reveal successful placement of a right ureteral stent  Left retrograde:  Blind ending ureter without filling defect  Complications:   None    Procedure and Technique:  I reviewed the procedure risks and benefits with the patient in the holding unit  The consent form was signed  The patient was brought to the operating room and properly identified  She was then placed in lithotomy position prepped and draped in the usual sterile fashion  Intravenous antibiotic had been administered preoperatively  A time-out was performed  Cystourethroscopy was then performed with a 25 British cystoscope with findings as above  A tiger tail catheter and dilute Omnipaque was then used to perform a retrograde pyelogram on the right  Again findings are as above  A solo wire was then passed up the right ureteral orifice and pushed into position into the kidney    A 22 cm 6 British stent was then placed over the wire and seen to coil nicely in the renal pelvis  Th braden Dangler was removed and then the wire withdrawn  A nice coil was seen in the bladder  A left retrograde pyelogram was then performed with findings as above  The patient has a congenitally absent left kidney  The bladder was drained and cystoscope removed  The patient tolerated the procedure well  She was taken to the recovery room in satisfactory condition     I was present for the entire procedure    Patient Disposition:  PACU     SIGNATURE: Bob Joel MD  DATE: January 21, 2018  TIME: 10:12 AM

## 2018-01-22 LAB
ALBUMIN SERPL BCP-MCNC: 3.2 G/DL (ref 3.5–5)
ALP SERPL-CCNC: 67 U/L (ref 46–116)
ALT SERPL W P-5'-P-CCNC: 22 U/L (ref 12–78)
ANION GAP SERPL CALCULATED.3IONS-SCNC: 8 MMOL/L (ref 4–13)
AST SERPL W P-5'-P-CCNC: 9 U/L (ref 5–45)
BACTERIA UR CULT: ABNORMAL
BASOPHILS # BLD AUTO: 0.01 THOUSANDS/ΜL (ref 0–0.1)
BASOPHILS NFR BLD AUTO: 0 % (ref 0–1)
BILIRUB SERPL-MCNC: 0.23 MG/DL (ref 0.2–1)
BUN SERPL-MCNC: 16 MG/DL (ref 5–25)
CALCIUM SERPL-MCNC: 8.3 MG/DL (ref 8.3–10.1)
CHLORIDE SERPL-SCNC: 113 MMOL/L (ref 100–108)
CO2 SERPL-SCNC: 22 MMOL/L (ref 21–32)
CREAT SERPL-MCNC: 0.75 MG/DL (ref 0.6–1.3)
EOSINOPHIL # BLD AUTO: 0.04 THOUSAND/ΜL (ref 0–0.61)
EOSINOPHIL NFR BLD AUTO: 0 % (ref 0–6)
ERYTHROCYTE [DISTWIDTH] IN BLOOD BY AUTOMATED COUNT: 13.4 % (ref 11.6–15.1)
GFR SERPL CREATININE-BSD FRML MDRD: 95 ML/MIN/1.73SQ M
GLUCOSE SERPL-MCNC: 111 MG/DL (ref 65–140)
HCT VFR BLD AUTO: 39.2 % (ref 34.8–46.1)
HGB BLD-MCNC: 13 G/DL (ref 11.5–15.4)
LYMPHOCYTES # BLD AUTO: 1.71 THOUSANDS/ΜL (ref 0.6–4.47)
LYMPHOCYTES NFR BLD AUTO: 13 % (ref 14–44)
MCH RBC QN AUTO: 27.8 PG (ref 26.8–34.3)
MCHC RBC AUTO-ENTMCNC: 33.2 G/DL (ref 31.4–37.4)
MCV RBC AUTO: 84 FL (ref 82–98)
MONOCYTES # BLD AUTO: 0.95 THOUSAND/ΜL (ref 0.17–1.22)
MONOCYTES NFR BLD AUTO: 7 % (ref 4–12)
NEUTROPHILS # BLD AUTO: 10.53 THOUSANDS/ΜL (ref 1.85–7.62)
NEUTS SEG NFR BLD AUTO: 80 % (ref 43–75)
NRBC BLD AUTO-RTO: 0 /100 WBCS
PLATELET # BLD AUTO: 264 THOUSANDS/UL (ref 149–390)
PMV BLD AUTO: 9.6 FL (ref 8.9–12.7)
POTASSIUM SERPL-SCNC: 3.6 MMOL/L (ref 3.5–5.3)
PROT SERPL-MCNC: 6.7 G/DL (ref 6.4–8.2)
RBC # BLD AUTO: 4.67 MILLION/UL (ref 3.81–5.12)
SODIUM SERPL-SCNC: 143 MMOL/L (ref 136–145)
WBC # BLD AUTO: 13.26 THOUSAND/UL (ref 4.31–10.16)

## 2018-01-22 PROCEDURE — 80053 COMPREHEN METABOLIC PANEL: CPT | Performed by: HOSPITALIST

## 2018-01-22 PROCEDURE — 85025 COMPLETE CBC W/AUTO DIFF WBC: CPT | Performed by: HOSPITALIST

## 2018-01-22 RX ORDER — OXYBUTYNIN CHLORIDE 5 MG/1
5 TABLET ORAL 3 TIMES DAILY
Status: DISCONTINUED | OUTPATIENT
Start: 2018-01-22 | End: 2018-01-23 | Stop reason: HOSPADM

## 2018-01-22 RX ORDER — CIPROFLOXACIN 2 MG/ML
400 INJECTION, SOLUTION INTRAVENOUS EVERY 12 HOURS
Status: DISCONTINUED | OUTPATIENT
Start: 2018-01-22 | End: 2018-01-23 | Stop reason: HOSPADM

## 2018-01-22 RX ORDER — KETOROLAC TROMETHAMINE 30 MG/ML
15 INJECTION, SOLUTION INTRAMUSCULAR; INTRAVENOUS ONCE
Status: COMPLETED | OUTPATIENT
Start: 2018-01-22 | End: 2018-01-22

## 2018-01-22 RX ADMIN — SODIUM CHLORIDE 50 ML/HR: 0.9 INJECTION, SOLUTION INTRAVENOUS at 14:41

## 2018-01-22 RX ADMIN — CIPROFLOXACIN 400 MG: 2 INJECTION, SOLUTION INTRAVENOUS at 17:39

## 2018-01-22 RX ADMIN — MORPHINE SULFATE 2 MG: 2 INJECTION, SOLUTION INTRAMUSCULAR; INTRAVENOUS at 07:56

## 2018-01-22 RX ADMIN — OXYBUTYNIN CHLORIDE 5 MG: 5 TABLET ORAL at 16:08

## 2018-01-22 RX ADMIN — MORPHINE SULFATE 2 MG: 2 INJECTION, SOLUTION INTRAMUSCULAR; INTRAVENOUS at 04:52

## 2018-01-22 RX ADMIN — TAMSULOSIN HYDROCHLORIDE 0.4 MG: 0.4 CAPSULE ORAL at 16:08

## 2018-01-22 RX ADMIN — KETOROLAC TROMETHAMINE 15 MG: 30 INJECTION, SOLUTION INTRAMUSCULAR at 16:09

## 2018-01-22 RX ADMIN — OXYBUTYNIN CHLORIDE 5 MG: 5 TABLET ORAL at 20:16

## 2018-01-22 NOTE — PROGRESS NOTES
Progress Note - Jose Guadalupe Barrios 52 y o  female MRN: [id number]    Unit/Bed#: -01 Encounter: [id number]      Assessment:  Ms Silviano Lucio is a 77-year-old female with history of congenitally absent left kidney presenting with renal colic secondary to punctate obstructing right ureteral calculi postoperative day 1 cystoscopy, bilateral retrograde pyelography and right ureteral stent placement  Patient is afebrile with interval increase in WBC count to 13,000 from 8 K  Patient offers complaints of persistent right flank pain and dysuria on urination  Plan:  Explained at length to Ms Silviano Lucio that what she is experiencing is related to spasms of urinary tract related to indwelling ureteral stent/foreign body  Patient was disappointed to find out that stone was not retrieved  Explained rationale for stent placement alone secondary to positive urinalysis at time of admission with subsequent urine culture result revealing suggestion of Proteus in urine pre cluding aggressive  manipulation or ureteroscopy  Patient will require further pain management prior to discharge  Will attempt the following:  · Increase IV fluid  · Administer Ditropan  · Toradol x1 dose  · Narcotics  · Patient may continue Flomax as prior  · Stool softener      Subjective:   Denies fever or chills  Positive right flank pain and dysuria    Objective:     Vitals: Blood pressure 136/68, pulse 63, temperature 98 3 °F (36 8 °C), temperature source Oral, resp  rate 18, height 4' 11" (1 499 m), weight 78 4 kg (172 lb 12 8 oz), SpO2 97 %  ,Body mass index is 34 9 kg/m²        Intake/Output Summary (Last 24 hours) at 01/22/18 1501  Last data filed at 01/22/18 1440   Gross per 24 hour   Intake          1084 17 ml   Output             1400 ml   Net          -315 83 ml       Physical Exam: General appearance: alert and oriented, in no acute distress and cooperative  Head: Normocephalic, without obvious abnormality, atraumatic  Neck: no adenopathy, no carotid bruit, no JVD, supple, symmetrical, trachea midline and thyroid not enlarged, symmetric, no tenderness/mass/nodules  Lungs: clear to auscultation bilaterally  Heart: regular rate and rhythm, S1, S2 normal, no murmur, click, rub or gallop  Abdomen: CVA tenderness  Extremities: extremities normal, warm and well-perfused; no cyanosis, clubbing, or edema  Pulses: 2+ and symmetric  Neurologic: Grossly normal  No external drains     Invasive Devices     Peripheral Intravenous Line            Peripheral IV 01/20/18 Left Antecubital 1 day              Lab Results   Component Value Date    WBC 13 26 (H) 01/22/2018    HGB 13 0 01/22/2018    HCT 39 2 01/22/2018    MCV 84 01/22/2018     01/22/2018     Lab Results   Component Value Date    GLUCOSE 111 01/22/2018    CALCIUM 8 3 01/22/2018     01/22/2018    K 3 6 01/22/2018    CO2 22 01/22/2018     (H) 01/22/2018    BUN 16 01/22/2018    CREATININE 0 75 01/22/2018       Lab, Imaging and other studies: I have personally reviewed pertinent reports

## 2018-01-22 NOTE — ASSESSMENT & PLAN NOTE
CT abd showed calculi within the distal right ureter, largest measuring 3 mm resulting in marked hydroureter and hydronephrosis  S/p ureteral stent placement  Labs reviewed significant for leukocytosis    Plan   C/w antimicrobial therapy  C/w pain control, IVF  F/u urology

## 2018-01-22 NOTE — ASSESSMENT & PLAN NOTE
With pyuria and bacteruria and urgency POA    Preliminary cx finding significant for gram neg rods- proteus mirabilis  POD 1- s/p ureteral stent  Reports lower abdominal pain controlled with meds  Labs remarkable for leukocytosis  Sensitive to ciprofloxacin  Starting cipro 400mg bid  Continue pain control  IVF hydration

## 2018-01-22 NOTE — CASE MANAGEMENT
Initial Clinical Review    Admission: Date/Time/Statement: Inpatient 1/20/18 @ 1828 Med Surg    Orders Placed This Encounter   Procedures    Inpatient Admission (expected length of stay for this patient is greater than two midnights)     Standing Status:   Standing     Number of Occurrences:   1     Order Specific Question:   Admitting Physician     Answer:   Azam Smith [[id number]]     Order Specific Question:   Level of Care     Answer:   Med Surg [16]     Order Specific Question:   Estimated length of stay     Answer:   More than 2 Midnights     Order Specific Question:   Certification     Answer:   I certify that inpatient services are medically necessary for this patient for a duration of greater than two midnights  See H&P and MD Progress Notes for additional information about the patient's course of treatment  ED: Date/Time/Mode of Arrival:   ED Arrival Information     Expected Arrival Acuity Means of Arrival Escorted By Service Admission Type    - 1/20/2018 13:40 Urgent Walk-In Self General Medicine Urgent    Arrival Complaint    BACK PAIN          Chief Complaint:   Chief Complaint   Patient presents with    Flank Pain     Patient reports she has 1 right kidney, states she started with right flank pain that radiates down her right side starting this morning  History of Illness:  52 y o  F, with congenital solitary kidney/absent left kidney who presents with chills and right flank pain  She started noticing urinary urgency yesterday  Today she started  having severe sharp pain right flank area radiating down to right lower abdomen and groin area, associated with nausea and chills  In the ED, CT abdomen showed distal right ureteral stone largest measuring 3 mm with associated marked hydroureter and hydronephrosis    Urine positive for pyuria and bacteriuria        ED Vital Signs:   ED Triage Vitals [01/20/18 1412]   Temperature Pulse Respirations Blood Pressure SpO2   98 °F (36 7 °C) 95 18 123/89 100 %      Temp Source Heart Rate Source Patient Position - Orthostatic VS BP Location FiO2 (%)   Oral Monitor Sitting Left arm --      Pain Score       9        Wt Readings from Last 1 Encounters:   01/20/18 78 4 kg (172 lb 12 8 oz)       Vital Signs (abnormal): WNL    Abnormal Labs: Wbc = 11 97    Diagnostic Test Results: CT Renal Stone Study Abd/ Pelvis - 1  Calculi within the distal right ureter, largest measuring 3 mm resulting in marked hydroureter and hydronephrosis  Multiple intrarenal calculi also present, largest measuring 4 mm and located in the lower pole  2  Congenitally absent left kidney  ED Treatment:   Medication Administration from 01/20/2018 1340 to 01/20/2018 2120       Date/Time Order Dose Route Action     01/20/2018 1655 sodium chloride 0 9 % bolus 1,000 mL 1,000 mL Intravenous New Bag     01/20/2018 1745 ceftriaxone (ROCEPHIN) 1 g/50 mL in dextrose IVPB 1,000 mg Intravenous New Bag     01/20/2018 1802 acetaminophen (TYLENOL) tablet 650 mg 650 mg Oral Given     01/20/2018 1803 ondansetron (ZOFRAN) injection 4 mg 4 mg Intravenous Given          Past Medical/Surgical History: Active Ambulatory Problems     Diagnosis Date Noted    No Active Ambulatory Problems     Resolved Ambulatory Problems     Diagnosis Date Noted    No Resolved Ambulatory Problems     Past Medical History:   Diagnosis Date    Congenital kidney disease        Admitting Diagnosis: Back pain [M54 9]  Pyelonephritis, acute [N10]  UTI (urinary tract infection) [N39 0]  Solitary kidney, congenital [Q60 0]  Ureteral stone with hydronephrosis [N13 2]    Age/Sex: 52 y o  female    Assessment/Plan:   Ureteral stone with hydronephrosis   Assessment & Plan     CT abd showed calculi within the distal right ureter, largest measuring 3 mm resulting in marked hydroureter and hydronephrosis  Urology consult   Aggressive IVF/Pain control/supportive care    NPO for now while waiting for uro input       UTI (urinary tract infection) Assessment & Plan     With pyuria and bacteruria and urgency  Received  IV ceftriaxone and will continue       Solitary kidney, congenital   Assessment & Plan     Cr normal          VTE Prophylaxis: Pharmacologic VTE Prophylaxis contraindicated due to Pending  decision regarding plan for OR  / sequential compression device   Code Status: Full code  POLST: POLST form is not discussed and not completed at this time  Discussion with family: Family at bedside     Anticipated Length of Stay:  Patient will be admitted on an Inpatient basis with an anticipated length of stay of  > 2 midnights  Justification for Hospital Stay: Above       Admission Orders:  1/21 OR - S/P CYSTOSCOPY; BILATERAL RETROGRADE PYELOGRAM WITH INSERTION STENT RIGHT URETERAL (Right Bladder)    Strain all urine  Urology cons    Scheduled Meds:   cefTRIAXone 1,000 mg Intravenous Q24H   tamsulosin 0 4 mg Oral Daily With Dinner     Continuous Infusions:   sodium chloride 125 mL/hr      PRN Meds:     Acetaminophen po x2    morphine injection    ondansetron    oxyCODONE    --------------------------------------------------------------------------------------------------    1/20 Urology cons:  Assessment:  1)Right distal ureteral stone with moderate hydronephrosis  2)Nonobstructing right renal calculi  3)Solitary right kidney  4)Possible urinary tract infection     Plan:  Patient has been admitted to the medical service  Agree with antibiotics and present therapy  We will proceed with cystoscopy,  right retrograde pyelography and right stent placement tomorrow       HPI- 70-year-old female with remote history of kidney stones who developed severe right flank pain today  This prompted her visit to the emergency room  She was found to have a 3 mm distal right ureteral stone causing hydronephrosis of a solitary kidney  In retrospect the patient notes she has been having more frequency and urgency recently

## 2018-01-22 NOTE — PROGRESS NOTES
Progress Note - Nikky Garcia 1970, 52 y o  female MRN: [id number]    Unit/Bed#: [facility]-01 Encounter: [id number]    Primary Care Provider: Polly Nguyen MD   Date and time admitted to hospital: 2018  4:21 PM        * Ureteral stone with hydronephrosis   Assessment & Plan    CT abd showed calculi within the distal right ureter, largest measuring 3 mm resulting in marked hydroureter and hydronephrosis  S/p ureteral stent placement  Labs reviewed significant for leukocytosis    Plan   C/w antimicrobial therapy  C/w pain control, IVF  F/u urology        UTI (urinary tract infection)   Assessment & Plan    With pyuria and bacteruria and urgency POA    Preliminary cx finding significant for gram neg rods- proteus mirabilis  POD 1- s/p ureteral stent  Reports lower abdominal pain controlled with meds  Labs remarkable for leukocytosis  Sensitive to ciprofloxacin  Starting cipro 400mg bid  Continue pain control  IVF hydration             Solitary kidney, congenital   Assessment & Plan    Cr normal  See above mgt            VTE Pharmacologic Prophylaxis:   Pharmacologic: Enoxaparin (Lovenox)  Mechanical VTE Prophylaxis in Place: Yes    Patient Centered Rounds: I have performed bedside rounds with nursing staff today  Discussions with Specialists or Other Care Team Provider: yes    Education and Discussions with Family / Patient: yes    Time Spent for Care: 45 minutes  More than 50% of total time spent on counseling and coordination of care as described above      Current Length of Stay: 2 day(s)    Current Patient Status: Inpatient   Certification Statement: The patient will continue to require additional inpatient hospital stay due to urological mgt    Discharge Plan: home     Code Status: Level 1 - Full Code      Subjective:   Severe lower abdominal pain controlled with meds  No fever, nausea, vomiting, changes in bowel habits;    Objective:     Vitals:   Temp (24hrs), Av 2 °F (36 8 °C), Min:98 1 °F (36 7 °C), Max:98 3 °F (36 8 °C)    HR:  [63-87] 70  Resp:  [18] 18  BP: (114-136)/(57-68) 124/67  SpO2:  [96 %-98 %] 98 %  Body mass index is 34 9 kg/m²  Input and Output Summary (last 24 hours): Intake/Output Summary (Last 24 hours) at 01/22/18 1619  Last data filed at 01/22/18 1601   Gross per 24 hour   Intake          1084 17 ml   Output             1700 ml   Net          -615 83 ml       Physical Exam:     Physical Exam   Constitutional: She is oriented to person, place, and time  She appears well-developed and well-nourished  No distress  HENT:   Head: Normocephalic and atraumatic  Mouth/Throat: Oropharynx is clear and moist    Eyes: Conjunctivae and EOM are normal  Pupils are equal, round, and reactive to light  Neck: Normal range of motion  Neck supple  No JVD present  Cardiovascular: Normal rate, normal heart sounds and intact distal pulses  Exam reveals no gallop and no friction rub  No murmur heard  Pulmonary/Chest: Effort normal and breath sounds normal  No respiratory distress  She has no wheezes  Abdominal: Soft  Bowel sounds are normal  She exhibits no distension  There is no tenderness  Musculoskeletal: Normal range of motion  She exhibits no edema or tenderness  Lymphadenopathy:     She has no cervical adenopathy  Neurological: She is alert and oriented to person, place, and time  She has normal reflexes  She displays normal reflexes  No cranial nerve deficit  She exhibits normal muscle tone  Coordination normal    Skin: Skin is warm and dry  No rash noted  No erythema  No pallor  Psychiatric: She has a normal mood and affect   Her behavior is normal  Judgment and thought content normal          Additional Data:     Labs:      Results from last 7 days  Lab Units 01/22/18  0455   WBC Thousand/uL 13 26*   HEMOGLOBIN g/dL 13 0   HEMATOCRIT % 39 2   PLATELETS Thousands/uL 264   NEUTROS PCT % 80*   LYMPHS PCT % 13*   MONOS PCT % 7   EOS PCT % 0       Results from last 7 days  Lab Units 01/22/18  0455   SODIUM mmol/L 143   POTASSIUM mmol/L 3 6   CHLORIDE mmol/L 113*   CO2 mmol/L 22   BUN mg/dL 16   CREATININE mg/dL 0 75   CALCIUM mg/dL 8 3   TOTAL PROTEIN g/dL 6 7   BILIRUBIN TOTAL mg/dL 0 23   ALK PHOS U/L 67   ALT U/L 22   AST U/L 9   GLUCOSE RANDOM mg/dL 111           * I Have Reviewed All Lab Data Listed Above  * Additional Pertinent Lab Tests Reviewed: Patrica 66 Admission Reviewed    Imaging:reviewed    Recent Cultures (last 7 days):       Results from last 7 days  Lab Units 01/20/18  1457   URINE CULTURE  80,000-89,000 cfu/ml Proteus mirabilis*       Last 24 Hours Medication List:     ciprofloxacin 400 mg Intravenous Q12H   oxybutynin 5 mg Oral TID   tamsulosin 0 4 mg Oral Daily With Dinner        Today, Patient Was Seen By: Paresh Washington MD    ** Please Note: Dictation voice to text software may have been used in the creation of this document   **

## 2018-01-22 NOTE — PLAN OF CARE
DISCHARGE PLANNING     Discharge to home or other facility with appropriate resources Progressing        DISCHARGE PLANNING - CARE MANAGEMENT     Discharge to post-acute care or home with appropriate resources Progressing        GENITOURINARY - ADULT     Maintains or returns to baseline urinary function Progressing        INFECTION - ADULT     Absence or prevention of progression during hospitalization Progressing        Knowledge Deficit     Patient/family/caregiver demonstrates understanding of disease process, treatment plan, medications, and discharge instructions Progressing        PAIN - ADULT     Verbalizes/displays adequate comfort level or baseline comfort level Progressing        Potential for Falls     Patient will remain free of falls Progressing        SAFETY ADULT     Patient will remain free of falls Progressing     Maintain or return to baseline ADL function Progressing     Maintain or return mobility status to optimal level Progressing

## 2018-01-23 VITALS
TEMPERATURE: 97.9 F | HEIGHT: 59 IN | SYSTOLIC BLOOD PRESSURE: 130 MMHG | WEIGHT: 172.8 LBS | RESPIRATION RATE: 20 BRPM | OXYGEN SATURATION: 97 % | DIASTOLIC BLOOD PRESSURE: 78 MMHG | HEART RATE: 64 BPM | BODY MASS INDEX: 34.84 KG/M2

## 2018-01-23 LAB
ALBUMIN SERPL BCP-MCNC: 2.9 G/DL (ref 3.5–5)
ALP SERPL-CCNC: 52 U/L (ref 46–116)
ALT SERPL W P-5'-P-CCNC: 18 U/L (ref 12–78)
ANION GAP SERPL CALCULATED.3IONS-SCNC: 7 MMOL/L (ref 4–13)
AST SERPL W P-5'-P-CCNC: 8 U/L (ref 5–45)
BASOPHILS # BLD AUTO: 0.03 THOUSANDS/ΜL (ref 0–0.1)
BASOPHILS NFR BLD AUTO: 0 % (ref 0–1)
BILIRUB SERPL-MCNC: 0.24 MG/DL (ref 0.2–1)
BUN SERPL-MCNC: 9 MG/DL (ref 5–25)
CALCIUM SERPL-MCNC: 7.8 MG/DL (ref 8.3–10.1)
CHLORIDE SERPL-SCNC: 111 MMOL/L (ref 100–108)
CO2 SERPL-SCNC: 25 MMOL/L (ref 21–32)
CREAT SERPL-MCNC: 0.7 MG/DL (ref 0.6–1.3)
EOSINOPHIL # BLD AUTO: 0.35 THOUSAND/ΜL (ref 0–0.61)
EOSINOPHIL NFR BLD AUTO: 4 % (ref 0–6)
ERYTHROCYTE [DISTWIDTH] IN BLOOD BY AUTOMATED COUNT: 13.4 % (ref 11.6–15.1)
GFR SERPL CREATININE-BSD FRML MDRD: 104 ML/MIN/1.73SQ M
GLUCOSE SERPL-MCNC: 112 MG/DL (ref 65–140)
HCT VFR BLD AUTO: 36.5 % (ref 34.8–46.1)
HGB BLD-MCNC: 12.3 G/DL (ref 11.5–15.4)
LYMPHOCYTES # BLD AUTO: 1.79 THOUSANDS/ΜL (ref 0.6–4.47)
LYMPHOCYTES NFR BLD AUTO: 19 % (ref 14–44)
MCH RBC QN AUTO: 28.3 PG (ref 26.8–34.3)
MCHC RBC AUTO-ENTMCNC: 33.7 G/DL (ref 31.4–37.4)
MCV RBC AUTO: 84 FL (ref 82–98)
MONOCYTES # BLD AUTO: 0.63 THOUSAND/ΜL (ref 0.17–1.22)
MONOCYTES NFR BLD AUTO: 7 % (ref 4–12)
NEUTROPHILS # BLD AUTO: 6.65 THOUSANDS/ΜL (ref 1.85–7.62)
NEUTS SEG NFR BLD AUTO: 70 % (ref 43–75)
NRBC BLD AUTO-RTO: 0 /100 WBCS
PLATELET # BLD AUTO: 214 THOUSANDS/UL (ref 149–390)
PMV BLD AUTO: 8.8 FL (ref 8.9–12.7)
POTASSIUM SERPL-SCNC: 3.6 MMOL/L (ref 3.5–5.3)
PROT SERPL-MCNC: 6.1 G/DL (ref 6.4–8.2)
RBC # BLD AUTO: 4.34 MILLION/UL (ref 3.81–5.12)
SODIUM SERPL-SCNC: 143 MMOL/L (ref 136–145)
WBC # BLD AUTO: 9.47 THOUSAND/UL (ref 4.31–10.16)

## 2018-01-23 PROCEDURE — 85025 COMPLETE CBC W/AUTO DIFF WBC: CPT | Performed by: HOSPITALIST

## 2018-01-23 PROCEDURE — 80053 COMPREHEN METABOLIC PANEL: CPT | Performed by: HOSPITALIST

## 2018-01-23 RX ORDER — OXYCODONE HYDROCHLORIDE 5 MG/1
5 TABLET ORAL EVERY 6 HOURS PRN
Qty: 30 TABLET | Refills: 0 | Status: SHIPPED | OUTPATIENT
Start: 2018-01-23 | End: 2018-01-25

## 2018-01-23 RX ORDER — CIPROFLOXACIN 500 MG/1
500 TABLET, FILM COATED ORAL EVERY 12 HOURS SCHEDULED
Qty: 8 TABLET | Refills: 0 | Status: SHIPPED | OUTPATIENT
Start: 2018-01-23 | End: 2018-01-27

## 2018-01-23 RX ORDER — OXYCODONE HYDROCHLORIDE 5 MG/1
5 TABLET ORAL EVERY 6 HOURS PRN
Status: DISCONTINUED | OUTPATIENT
Start: 2018-01-23 | End: 2018-01-23 | Stop reason: HOSPADM

## 2018-01-23 RX ORDER — OXYCODONE HYDROCHLORIDE 10 MG/1
10 TABLET ORAL EVERY 6 HOURS PRN
Status: DISCONTINUED | OUTPATIENT
Start: 2018-01-23 | End: 2018-01-23 | Stop reason: HOSPADM

## 2018-01-23 RX ORDER — TAMSULOSIN HYDROCHLORIDE 0.4 MG/1
0.4 CAPSULE ORAL
Qty: 14 CAPSULE | Refills: 0 | Status: SHIPPED | OUTPATIENT
Start: 2018-01-23 | End: 2018-02-13

## 2018-01-23 RX ORDER — ACETAMINOPHEN 325 MG/1
650 TABLET ORAL EVERY 6 HOURS PRN
Qty: 30 TABLET | Refills: 0
Start: 2018-01-23 | End: 2018-04-15

## 2018-01-23 RX ORDER — MORPHINE SULFATE 2 MG/ML
2 INJECTION, SOLUTION INTRAMUSCULAR; INTRAVENOUS EVERY 6 HOURS PRN
Status: DISCONTINUED | OUTPATIENT
Start: 2018-01-23 | End: 2018-01-23 | Stop reason: HOSPADM

## 2018-01-23 RX ORDER — OXYBUTYNIN CHLORIDE 5 MG/1
5 TABLET ORAL 3 TIMES DAILY
Qty: 15 TABLET | Refills: 0 | Status: SHIPPED | OUTPATIENT
Start: 2018-01-23 | End: 2018-02-13

## 2018-01-23 RX ADMIN — CIPROFLOXACIN 400 MG: 2 INJECTION, SOLUTION INTRAVENOUS at 05:38

## 2018-01-23 RX ADMIN — ENOXAPARIN SODIUM 40 MG: 40 INJECTION SUBCUTANEOUS at 09:07

## 2018-01-23 RX ADMIN — SODIUM CHLORIDE 100 ML/HR: 0.9 INJECTION, SOLUTION INTRAVENOUS at 03:49

## 2018-01-23 RX ADMIN — OXYBUTYNIN CHLORIDE 5 MG: 5 TABLET ORAL at 09:07

## 2018-01-23 NOTE — PROGRESS NOTES
Seen by primary service doctor and will be going home today pt appears pain free is tolerating ambulation and diet

## 2018-01-23 NOTE — DISCHARGE INSTR - AVS FIRST PAGE
Ms Teresita Krueger was under the care of [facility] from January 20, 2018 for kidney stones requiring surgical intervention January 22, 2018   The  [facility]  service recommends 2 weeks antibiotics  Patient can return to work January 25th without restrictions  Patient will require further surgery in approximately 2 weeks once infection is clear  Please contact [facility] with any questions or if you require any additional documentation  Our office contact information is located in section titled follow-up provider's      CANDICE Ewing, ACNP-BC [facility]

## 2018-01-23 NOTE — DISCHARGE SUMMARY
Discharge Summary - [facility] Internal Medicine    Patient Information: Dena Blum 52 y o  female MRN: [id number]  Unit/Bed#: -01 Encounter: [id number]    Discharging Physician / Practitioner: Joe Hudson MD  PCP: Gwen Duarte MD  Admission Date: 1/20/2018  Discharge Date: 01/23/18    Disposition:     Home    Reason for Admission:  Right-sided flank pain    Discharge Diagnoses:     Principal Problem:    Ureteral stone with hydronephrosis  Active Problems:    UTI (urinary tract infection)    Solitary kidney, congenital  Resolved Problems:    * No resolved hospital problems  *      Consultations During Hospital Stay:  · Urology    Procedures Performed:     · Cystoscopy with retrograde pyelogram with right ureteric stent placement on 1/21/18    Significant Findings / Test Results:     · CT renal stone study:  Calculi within distal right ureter, largest 3 mm, resulting into marked hydro ureter and hydronephrosis  Congenitally absent left kidney  · Urine cultures growing [id number] Proteus    Incidental Findings:   · None     Test Results Pending at Discharge (will require follow up): · None     Outpatient Tests Requested:  · None    Complications:  None    Hospital Course:     Dena Blum is a 52 y o  female patient who originally presented to the hospital on 1/20/2018 due to right-sided flank pain  She has history of congenitally absent left kidney on  On admission was found to have multiple calculi in distal right ureter maximum of 3 mm in dimension causing right-sided hydro ureter and hydronephrosis  She was seen by Urology  Underwent cystoscopy with retrograde pyelogram and right-sided ureteric stent placement due to urinalysis being positive  She was also treated with IV antibiotics and IV pain medications  She did have postprocedure pain more with micturition as well as hematuria which is now improving    Since her symptoms are improving she is being discharged home with outpatient follow-up with Urology  She is given information to call their office to make appointment next week  She has been informed that because of positive urinalysis stones were not removed and there is a stent placed which eventually needs to be removed along with the stones  Her urinalysis eventually grew Proteus [id number], will finish course of antibiotics for total 7 days, prescription for oral Cipro given to complete the course  Also given prescription for Flomax, oxybutynin and oral oxycodone for 2 days if needed  Advised to keep herself hydrated    Condition at Discharge: stable     Discharge Day Visit / Exam:     Subjective:  Feels better today, did not require any pain medication overnight, no hay fever no chills no nausea no vomiting, hematuria clearing up, some pain with micturition present  Vitals: Blood Pressure: 130/78 (01/23/18 0732)  Pulse: 64 (01/23/18 0732)  Temperature: 97 9 °F (36 6 °C) (01/23/18 0732)  Temp Source: Oral (01/23/18 0732)  Respirations: 20 (01/23/18 0732)  Height: 4' 11" (149 9 cm) (01/20/18 2120)  Weight - Scale: 78 4 kg (172 lb 12 8 oz) (01/20/18 2120)  SpO2: 97 % (01/23/18 0732)  Exam:   Physical Exam   Constitutional: She is oriented to person, place, and time  She appears well-developed and well-nourished  HENT:   Head: Normocephalic and atraumatic  Eyes: Conjunctivae are normal  No scleral icterus  Cardiovascular: Normal rate, regular rhythm and normal heart sounds  Exam reveals no gallop and no friction rub  No murmur heard  Pulmonary/Chest: Effort normal and breath sounds normal  No respiratory distress  She has no wheezes  Abdominal: Soft  She exhibits no distension  There is tenderness  Mild right lower quadrant tenderness   Musculoskeletal: She exhibits no edema  Neurological: She is alert and oriented to person, place, and time         Discussion with Family:     Discharge instructions/Information to patient and family:   See after visit summary for information provided to patient and family  Provisions for Follow-Up Care:  See after visit summary for information related to follow-up care and any pertinent home health orders  Planned Readmission: no     Discharge Statement:  I spent 45 minutes discharging the patient  This time was spent on the day of discharge  I had direct contact with the patient on the day of discharge  Greater than 50% of the total time was spent examining patient, answering all patient questions, arranging and discussing plan of care with patient as well as directly providing post-discharge instructions  Additional time then spent on discharge activities  Discharge Medications:  See after visit summary for reconciled discharge medications provided to patient and family        ** Please Note: This note has been constructed using a voice recognition system **

## 2018-01-24 ENCOUNTER — TELEPHONE (OUTPATIENT)
Dept: UROLOGY | Facility: AMBULATORY SURGERY CENTER | Age: 48
End: 2018-01-24

## 2018-01-24 NOTE — TELEPHONE ENCOUNTER
Pt had right stent placed on 1/21 for 3 mm stone  Needs to set up appt for second procedure  Appt with D Lowella Holstein   On 2/5

## 2018-01-24 NOTE — TELEPHONE ENCOUNTER
Patient stated she received a call back to schedule her post op follow up can she please receive a returned call thank you

## 2018-01-25 NOTE — CASE MANAGEMENT
Notification of Discharge  This is a Notification of Discharge from our facility [address]  Please be advised that this patient has been discharge from our facility  Below you will find the admission and discharge date and time including the patients disposition  PRESENTATION DATE: 1/20/2018  4:21 PM  IP ADMISSION DATE: 1/20/18 1828  DISCHARGE DATE: 1/23/2018 11:56 AM  DISPOSITION: Home/Self Care    [address] [place] [address] in the [facility] by Efrain Holland for 2017  Network Utilization Review Department  Phone: [phone]; Fax [phone]  ATTENTION: The Network Utilization Review Department is now centralized for our 7 Facilities  Make a note that we have a new phone and fax numbers for our Department  Please call with any questions or concerns to [phone] and carefully follow the prompts so that you are directed to the right person  All voicemails are confidential  Fax any determinations, approvals, denials, and requests for initial or continue stay review clinical to [phone]  Due to HIGH CALL volume, it would be easier if you could please send faxed requests to expedite your requests and in part, help us provide discharge notifications faster

## 2018-02-02 RX ORDER — TOPIRAMATE 25 MG/1
25 TABLET ORAL 2 TIMES DAILY
COMMUNITY
Start: 2018-01-23 | End: 2018-09-24 | Stop reason: SDUPTHER

## 2018-02-02 RX ORDER — CYCLOBENZAPRINE HCL 10 MG
1 TABLET ORAL 3 TIMES DAILY PRN
COMMUNITY
Start: 2016-07-10 | End: 2018-04-15

## 2018-02-02 RX ORDER — ESTRADIOL 2 MG/1
2 TABLET ORAL DAILY
Refills: 3 | COMMUNITY
Start: 2017-12-13 | End: 2019-02-07 | Stop reason: SDUPTHER

## 2018-02-05 ENCOUNTER — OFFICE VISIT (OUTPATIENT)
Dept: UROLOGY | Facility: MEDICAL CENTER | Age: 48
End: 2018-02-05
Payer: COMMERCIAL

## 2018-02-05 VITALS
BODY MASS INDEX: 35.68 KG/M2 | DIASTOLIC BLOOD PRESSURE: 70 MMHG | WEIGHT: 177 LBS | SYSTOLIC BLOOD PRESSURE: 110 MMHG | HEIGHT: 59 IN

## 2018-02-05 DIAGNOSIS — N20.0 KIDNEY STONES: ICD-10-CM

## 2018-02-05 DIAGNOSIS — N20.1 URETERAL CALCULUS, RIGHT: Primary | ICD-10-CM

## 2018-02-05 DIAGNOSIS — N20.1 RIGHT URETERAL STONE: Primary | ICD-10-CM

## 2018-02-05 LAB
SL AMB  POCT GLUCOSE, UA: ABNORMAL
SL AMB LEUKOCYTE ESTERASE,UA: ABNORMAL
SL AMB POCT BILIRUBIN,UA: ABNORMAL
SL AMB POCT BLOOD,UA: ABNORMAL
SL AMB POCT CLARITY,UA: CLEAR
SL AMB POCT COLOR,UA: YELLOW
SL AMB POCT KETONES,UA: ABNORMAL
SL AMB POCT NITRITE,UA: ABNORMAL
SL AMB POCT PH,UA: 7.5
SL AMB POCT SPECIFIC GRAVITY,UA: 1.01
SL AMB POCT URINE PROTEIN: ABNORMAL
SL AMB POCT UROBILINOGEN: 0.2

## 2018-02-05 PROCEDURE — 87086 URINE CULTURE/COLONY COUNT: CPT | Performed by: NURSE PRACTITIONER

## 2018-02-05 PROCEDURE — 81003 URINALYSIS AUTO W/O SCOPE: CPT | Performed by: NURSE PRACTITIONER

## 2018-02-05 PROCEDURE — 99213 OFFICE O/P EST LOW 20 MIN: CPT | Performed by: NURSE PRACTITIONER

## 2018-02-05 NOTE — PROGRESS NOTES
Assessment/Plan:    RT renal calculus   Ureteral calculus, right  Solitary Rt kidney    Continue fluids  flomax  Analgesics  Cysto/Rt laser lith/rt uscope/rt stent exchange  UC today   Retrieve old UC from 2/1/18 [facility]  Options, alternatives, and risks  discussed ; patient wishes to proceed  Other orders  -     cyclobenzaprine (FLEXERIL) 10 mg tablet; Take 1 tablet by mouth 3 (three) times a day as needed  -     estradiol (ESTRACE) 2 MG tablet; Take 2 mg by mouth daily  -     topiramate (TOPAMAX) 25 mg tablet;   -     cholecalciferol (VITAMIN D3) 1,000 units tablet; Take by mouth        Subjective:   History of presenting illness     Marilee Barnard @ is a 52 y o  female is here for evaluation and treatment of kidney stones  Had stent placed by Dr Kev Bell in [facility]  Has solitary kidney and 3mm distal rt ureteral stone  As well as 4mm rt renal calculus  Had underlying uti initially and that is why only stent was  Placed  Had FU UC on 2/1/18 at [facility]       Prior stone treatment       The following portions of the patient's history were reviewed and updated as appropriate: allergies, current medications, past family history, past medical history, past social history, past surgical history and problem list       Review of Systems - History obtained from chart review and the patient  General ROS: negative  Respiratory ROS: no cough, shortness of breath, or wheezing  Cardiovascular ROS: negative  Gastrointestinal ROS: no abdominal pain, change in bowel habits, or black or bloody stools  Genito-Urinary ROS: positive for - hematuria and urinary frequency/urgency  Musculoskeletal ROS: negative    Objective:    Physical examination:  Constitutional: Alert and oriented  Resp: CTA  Abdomen: soft, non tender  Cardio: RRR  Musculo: NL  Neuro: NL

## 2018-02-05 NOTE — H&P
Assessment/Plan:    RT renal calculus   Ureteral calculus, right  Solitary Rt kidney    Continue fluids  flomax  Analgesics  Cysto/Rt laser lith/rt uscope/rt stent exchange  UC today   Retrieve old UC from 2/1/18 [facility]  Options, alternatives, and risks  discussed ; patient wishes to proceed  Other orders  -     cyclobenzaprine (FLEXERIL) 10 mg tablet; Take 1 tablet by mouth 3 (three) times a day as needed  -     estradiol (ESTRACE) 2 MG tablet; Take 2 mg by mouth daily  -     topiramate (TOPAMAX) 25 mg tablet;   -     cholecalciferol (VITAMIN D3) 1,000 units tablet; Take by mouth        Subjective:   History of presenting illness     Nida John @ is a 52 y o  female is here for evaluation and treatment of kidney stones  Had stent placed by Dr Tra Jarvis in [facility]  Has solitary kidney and 3mm distal rt ureteral stone  As well as 4mm rt renal calculus  Had underlying uti initially and that is why only stent was  Placed  Had FU UC on 2/1/18 at [facility]       Prior stone treatment       The following portions of the patient's history were reviewed and updated as appropriate: allergies, current medications, past family history, past medical history, past social history, past surgical history and problem list       Review of Systems - History obtained from chart review and the patient  General ROS: negative  Respiratory ROS: no cough, shortness of breath, or wheezing  Cardiovascular ROS: negative  Gastrointestinal ROS: no abdominal pain, change in bowel habits, or black or bloody stools  Genito-Urinary ROS: positive for - hematuria and urinary frequency/urgency  Musculoskeletal ROS: negative    Objective:    Physical examination:  Constitutional: Alert and oriented  Resp: CTA  Abdomen: soft, non tender  Cardio: RRR  Musculo: NL  Neuro: NL

## 2018-02-06 DIAGNOSIS — N20.1 URETERAL STONE: Primary | ICD-10-CM

## 2018-02-07 LAB — BACTERIA UR CULT: NORMAL

## 2018-02-07 NOTE — H&P
HISTORY AND PHYSICAL  ? ? Patient Name: Pham Serrano  Patient MRN: [id number]  Attending Provider: Sapphire Cervantes MD  Service: Urology  Chief Complaint    Right mid to lower ureteral stone 7 mm  HPI   Pham Serrano is a 52 y o  female with  kidney stone attack mid January, admitted to [facility] 81 20th of January  On January 21st, underwent cysto and right stent placement  She is having mild stent discomfort since that time but no symptoms of infection  I plan  cystoscopy, right ureteroscopy, laser stone, stent replacement  Potential risks and complications discussed, and informed consent was given by the patient  Medications  Meds/Allergies     No current facility-administered medications for this encounter  Cannot display prior to admission medications because the patient has not been admitted in this contact  No current facility-administered medications for this encounter       Current Outpatient Prescriptions:     acetaminophen (TYLENOL) 325 mg tablet, Take 2 tablets by mouth every 6 (six) hours as needed for mild pain or moderate pain, Disp: 30 tablet, Rfl: 0    cholecalciferol (VITAMIN D3) 1,000 units tablet, Take by mouth, Disp: , Rfl:     cyclobenzaprine (FLEXERIL) 10 mg tablet, Take 1 tablet by mouth 3 (three) times a day as needed, Disp: , Rfl:     estradiol (ESTRACE) 2 MG tablet, Take 2 mg by mouth daily, Disp: , Rfl: 3    oxybutynin (DITROPAN) 5 mg tablet, Take 1 tablet by mouth 3 (three) times a day for 5 days, Disp: 15 tablet, Rfl: 0    tamsulosin (FLOMAX) 0 4 mg, Take 1 capsule by mouth daily with dinner for 14 days, Disp: 14 capsule, Rfl: 0    topiramate (TOPAMAX) 25 mg tablet, , Disp: , Rfl:   Review of Systems  10 point review of systems negative except as noted in HPI  Allergies  Allergies   Allergen Reactions    Metoclopramide     Nitrofurantoin     Diphenhydramine Palpitations     PMH  Past Medical History:   Diagnosis Date    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  Physical Exam  General appearance: alert and oriented, in no acute distress  Head: Normocephalic, without obvious abnormality, atraumatic  Neck: no adenopathy, no carotid bruit, no JVD, supple, symmetrical, trachea midline and thyroid not enlarged, symmetric, no tenderness/mass/nodules  Lungs: clear to auscultation bilaterally  Heart: regular rate and rhythm, S1, S2 normal, no murmur, click, rub or gallop  Abdomen: soft, non-tender; bowel sounds normal; no masses,  no organomegaly  Extremities: extremities normal, warm and well-perfused; no cyanosis, clubbing, or edema  Pulses: 2+ and symmetric  Lymph nodes: Cervical, supraclavicular, and axillary nodes normal   Neurologic: Grossly normal  Noble MD Maciej

## 2018-02-13 ENCOUNTER — DOCUMENTATION (OUTPATIENT)
Dept: UROLOGY | Facility: MEDICAL CENTER | Age: 48
End: 2018-02-13

## 2018-02-13 ENCOUNTER — HOSPITAL ENCOUNTER (EMERGENCY)
Facility: HOSPITAL | Age: 48
Discharge: HOME | End: 2018-02-13
Attending: EMERGENCY MEDICINE | Admitting: EMERGENCY MEDICINE
Payer: COMMERCIAL

## 2018-02-13 ENCOUNTER — TELEPHONE (OUTPATIENT)
Dept: UROLOGY | Facility: AMBULATORY SURGERY CENTER | Age: 48
End: 2018-02-13

## 2018-02-13 VITALS
WEIGHT: 177 LBS | HEART RATE: 63 BPM | OXYGEN SATURATION: 96 % | DIASTOLIC BLOOD PRESSURE: 57 MMHG | TEMPERATURE: 97.4 F | SYSTOLIC BLOOD PRESSURE: 115 MMHG | BODY MASS INDEX: 35.75 KG/M2 | RESPIRATION RATE: 18 BRPM

## 2018-02-13 DIAGNOSIS — N23 RENAL COLIC ON RIGHT SIDE: Primary | ICD-10-CM

## 2018-02-13 LAB
AMORPH PHOS CRY URNS QL MICRO: ABNORMAL /HPF
ANION GAP SERPL CALCULATED.3IONS-SCNC: 7 MMOL/L (ref 4–13)
BACTERIA UR QL AUTO: ABNORMAL /HPF
BASOPHILS # BLD AUTO: 0.03 THOUSANDS/ΜL (ref 0–0.1)
BASOPHILS NFR BLD AUTO: 0 % (ref 0–1)
BILIRUB UR QL STRIP: NEGATIVE
BUN SERPL-MCNC: 14 MG/DL (ref 5–25)
CALCIUM SERPL-MCNC: 8.2 MG/DL (ref 8.3–10.1)
CHLORIDE SERPL-SCNC: 108 MMOL/L (ref 100–108)
CLARITY UR: ABNORMAL
CO2 SERPL-SCNC: 25 MMOL/L (ref 21–32)
COLOR UR: YELLOW
CREAT SERPL-MCNC: 0.81 MG/DL (ref 0.6–1.3)
EOSINOPHIL # BLD AUTO: 0.93 THOUSAND/ΜL (ref 0–0.61)
EOSINOPHIL NFR BLD AUTO: 10 % (ref 0–6)
ERYTHROCYTE [DISTWIDTH] IN BLOOD BY AUTOMATED COUNT: 13.4 % (ref 11.6–15.1)
GFR SERPL CREATININE-BSD FRML MDRD: 87 ML/MIN/1.73SQ M
GLUCOSE SERPL-MCNC: 110 MG/DL (ref 65–140)
GLUCOSE UR STRIP-MCNC: NEGATIVE MG/DL
HCT VFR BLD AUTO: 39.7 % (ref 34.8–46.1)
HGB BLD-MCNC: 13 G/DL (ref 11.5–15.4)
HGB UR QL STRIP.AUTO: ABNORMAL
KETONES UR STRIP-MCNC: NEGATIVE MG/DL
LEUKOCYTE ESTERASE UR QL STRIP: NEGATIVE
LYMPHOCYTES # BLD AUTO: 2.37 THOUSANDS/ΜL (ref 0.6–4.47)
LYMPHOCYTES NFR BLD AUTO: 26 % (ref 14–44)
MCH RBC QN AUTO: 27.8 PG (ref 26.8–34.3)
MCHC RBC AUTO-ENTMCNC: 32.7 G/DL (ref 31.4–37.4)
MCV RBC AUTO: 85 FL (ref 82–98)
MONOCYTES # BLD AUTO: 0.65 THOUSAND/ΜL (ref 0.17–1.22)
MONOCYTES NFR BLD AUTO: 7 % (ref 4–12)
MUCOUS THREADS UR QL AUTO: ABNORMAL
NEUTROPHILS # BLD AUTO: 5.33 THOUSANDS/ΜL (ref 1.85–7.62)
NEUTS SEG NFR BLD AUTO: 57 % (ref 43–75)
NITRITE UR QL STRIP: NEGATIVE
NON-SQ EPI CELLS URNS QL MICRO: ABNORMAL /HPF
NRBC BLD AUTO-RTO: 0 /100 WBCS
PH UR STRIP.AUTO: 7 [PH] (ref 4.5–8)
PLATELET # BLD AUTO: 236 THOUSANDS/UL (ref 149–390)
PMV BLD AUTO: 9.3 FL (ref 8.9–12.7)
POTASSIUM SERPL-SCNC: 3.9 MMOL/L (ref 3.5–5.3)
PROT UR STRIP-MCNC: >=300 MG/DL
RBC # BLD AUTO: 4.67 MILLION/UL (ref 3.81–5.12)
RBC #/AREA URNS AUTO: ABNORMAL /HPF
SODIUM SERPL-SCNC: 140 MMOL/L (ref 136–145)
SP GR UR STRIP.AUTO: 1.02 (ref 1–1.03)
UROBILINOGEN UR QL STRIP.AUTO: 0.2 E.U./DL
WBC # BLD AUTO: 9.31 THOUSAND/UL (ref 4.31–10.16)
WBC #/AREA URNS AUTO: ABNORMAL /HPF

## 2018-02-13 PROCEDURE — 85025 COMPLETE CBC W/AUTO DIFF WBC: CPT | Performed by: EMERGENCY MEDICINE

## 2018-02-13 PROCEDURE — 96375 TX/PRO/DX INJ NEW DRUG ADDON: CPT

## 2018-02-13 PROCEDURE — 96361 HYDRATE IV INFUSION ADD-ON: CPT

## 2018-02-13 PROCEDURE — 81002 URINALYSIS NONAUTO W/O SCOPE: CPT | Performed by: EMERGENCY MEDICINE

## 2018-02-13 PROCEDURE — 96374 THER/PROPH/DIAG INJ IV PUSH: CPT

## 2018-02-13 PROCEDURE — 80048 BASIC METABOLIC PNL TOTAL CA: CPT | Performed by: EMERGENCY MEDICINE

## 2018-02-13 PROCEDURE — 99284 EMERGENCY DEPT VISIT MOD MDM: CPT

## 2018-02-13 PROCEDURE — 36415 COLL VENOUS BLD VENIPUNCTURE: CPT | Performed by: EMERGENCY MEDICINE

## 2018-02-13 PROCEDURE — 81001 URINALYSIS AUTO W/SCOPE: CPT

## 2018-02-13 RX ORDER — OXYCODONE HYDROCHLORIDE 5 MG/1
5 TABLET ORAL ONCE
Status: COMPLETED | OUTPATIENT
Start: 2018-02-13 | End: 2018-02-13

## 2018-02-13 RX ORDER — KETOROLAC TROMETHAMINE 30 MG/ML
15 INJECTION, SOLUTION INTRAMUSCULAR; INTRAVENOUS ONCE
Status: COMPLETED | OUTPATIENT
Start: 2018-02-13 | End: 2018-02-13

## 2018-02-13 RX ORDER — ONDANSETRON 4 MG/1
4 TABLET, FILM COATED ORAL EVERY 6 HOURS
Qty: 12 TABLET | Refills: 0 | Status: SHIPPED | OUTPATIENT
Start: 2018-02-13 | End: 2018-04-11

## 2018-02-13 RX ORDER — ONDANSETRON 4 MG/1
4 TABLET, ORALLY DISINTEGRATING ORAL ONCE
Status: COMPLETED | OUTPATIENT
Start: 2018-02-13 | End: 2018-02-13

## 2018-02-13 RX ORDER — ONDANSETRON 2 MG/ML
4 INJECTION INTRAMUSCULAR; INTRAVENOUS ONCE
Status: COMPLETED | OUTPATIENT
Start: 2018-02-13 | End: 2018-02-13

## 2018-02-13 RX ADMIN — KETOROLAC TROMETHAMINE 15 MG: 30 INJECTION, SOLUTION INTRAMUSCULAR at 21:47

## 2018-02-13 RX ADMIN — HYDROMORPHONE HYDROCHLORIDE 1 MG: 1 INJECTION, SOLUTION INTRAMUSCULAR; INTRAVENOUS; SUBCUTANEOUS at 21:47

## 2018-02-13 RX ADMIN — ONDANSETRON 4 MG: 2 INJECTION INTRAMUSCULAR; INTRAVENOUS at 21:47

## 2018-02-13 RX ADMIN — SODIUM CHLORIDE 1000 ML: 0.9 INJECTION, SOLUTION INTRAVENOUS at 21:47

## 2018-02-13 RX ADMIN — OXYCODONE HYDROCHLORIDE 5 MG: 5 TABLET ORAL at 23:34

## 2018-02-13 RX ADMIN — ONDANSETRON 4 MG: 4 TABLET, ORALLY DISINTEGRATING ORAL at 23:34

## 2018-02-13 NOTE — TELEPHONE ENCOUNTER
She can use Tylenol and Advil together  TAMSULOSIN can be very effective for stent discomfort  It looks like she has that these medications  She is scheduled to his have surgery in 3 days with Dr Lawanda Zapata  She recently saw did Janes Agudelo

## 2018-02-13 NOTE — TELEPHONE ENCOUNTER
Spoke with patient  She stated she is in a lot of pain and  Is wondering if she still has an infection?  Phone number [phone]  Patient also needs a doctor's note for work faxed to: [phone] thank you

## 2018-02-13 NOTE — TELEPHONE ENCOUNTER
Pt currently at work, unable to take Oxycodone and OTC's not effective  Asking if something else can be prescribed that she can take while working  Will direct to Dr Kyra Sanchez  Surgery sched for 2/16  Needs work release faxed

## 2018-02-14 ENCOUNTER — ANESTHESIA EVENT (OUTPATIENT)
Dept: PERIOP | Facility: HOSPITAL | Age: 48
End: 2018-02-14
Payer: COMMERCIAL

## 2018-02-14 NOTE — H&P
HISTORY AND PHYSICAL  ? ? Patient Name: Brad Estrada  Patient MRN: [id number]  Attending Provider: Shruti Meraz MD  Service: Urology  Chief Complaint    Right ureteral stone  HPI   Brad Estrada is a 52 y o  female with stent placed 2 wk ago for obstructing stone in solitary right kidney  I plan cysto, right ureteroscopy, laser stone, stent replacement  Potential risks and complications discussed, and informed consent was given by the patient  Medications  Meds/Allergies     No current facility-administered medications for this encounter  Cannot display prior to admission medications because the patient has not been admitted in this contact  No current facility-administered medications for this encounter       Current Outpatient Prescriptions:     acetaminophen (TYLENOL) 325 mg tablet, Take 2 tablets by mouth every 6 (six) hours as needed for mild pain or moderate pain, Disp: 30 tablet, Rfl: 0    cholecalciferol (VITAMIN D3) 1,000 units tablet, Take by mouth, Disp: , Rfl:     cyclobenzaprine (FLEXERIL) 10 mg tablet, Take 1 tablet by mouth 3 (three) times a day as needed, Disp: , Rfl:     estradiol (ESTRACE) 2 MG tablet, Take 2 mg by mouth daily, Disp: , Rfl: 3    ondansetron (ZOFRAN) 4 mg tablet, Take 1 tablet (4 mg total) by mouth every 6 (six) hours for 7 days, Disp: 12 tablet, Rfl: 0    topiramate (TOPAMAX) 25 mg tablet, , Disp: , Rfl:   Review of Systems  10 point review of systems negative except as noted in HPI  Allergies  Allergies   Allergen Reactions    Metoclopramide     Nitrofurantoin     Diphenhydramine Palpitations     PMH  Past Medical History:   Diagnosis Date    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  Physical Exam  General appearance: alert and oriented, in no acute distress  Head: Normocephalic, without obvious abnormality, atraumatic  Neck: no adenopathy, no carotid bruit, no JVD, supple, symmetrical, trachea midline and thyroid not enlarged, symmetric, no tenderness/mass/nodules  Lungs: clear to auscultation bilaterally  Heart: regular rate and rhythm, S1, S2 normal, no murmur, click, rub or gallop  Abdomen: soft, non-tender; bowel sounds normal; no masses,  no organomegaly  Extremities: extremities normal, warm and well-perfused; no cyanosis, clubbing, or edema  Pulses: 2+ and symmetric  Lymph nodes: Cervical, supraclavicular, and axillary nodes normal   Neurologic: Grossly normal  Anna Contreras MD

## 2018-02-14 NOTE — ED PROVIDER NOTES
History  Chief Complaint   Patient presents with    Flank Pain     Right sided flank pain, pt states she was diagnosed with kidney stone on 1/29/18 and is scheduled for surgery on 2/16/18 but pain is getting worse  Pt only has right kidney  51 yo female c/o continued pain not responding to prescribed regimen for renal colic, diagnosed with right ureteral stone, complicated by pyelonephritis, admitted 1/20-1/23, treated with right ureteral stent  She has been followed up urologist Dr Nalini Uriostegui who has her set up for cystoscopy, right ureteroscopy, laser stone, stent replacement 2/16/18  History provided by:  Patient  Flank Pain   Pain location:  R flank  Pain radiates to:  Groin  Pain severity:  Severe  Onset quality:  Gradual  Duration:  2 weeks  Timing:  Constant  Progression:  Waxing and waning  Chronicity:  Recurrent  Relieved by:  Nothing  Worsened by: Movement  Ineffective treatments: Prescription narcotics  Associated symptoms: nausea and vomiting    Associated symptoms: no chest pain, no chills, no cough, no diarrhea, no dysuria, no fever, no hematuria, no shortness of breath and no sore throat        Prior to Admission Medications   Prescriptions Last Dose Informant Patient Reported?  Taking?   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       Past Surgical History:   Procedure Laterality Date    HYSTERECTOMY      KNEE SURGERY      [place] CYSTOURETHROSCOPY,URETER CATHETER Right 1/21/2018    Procedure: CYSTOSCOPY; BILATERAL RETROGRADE PYELOGRAM WITH INSERTION STENT RIGHT URETERAL;  Surgeon: Marcel Simon MD;  [facility];  Service: Urology       History reviewed  No pertinent family history  I have reviewed and agree with the history as documented  Social History   Substance Use Topics    Smoking status: Never Smoker    Smokeless tobacco: Never Used    Alcohol use No        Review of Systems   Constitutional: Negative for appetite change, chills and fever  HENT: Negative for sore throat  Respiratory: Negative for cough, shortness of breath and wheezing  Cardiovascular: Negative for chest pain and palpitations  Gastrointestinal: Positive for nausea and vomiting  Negative for abdominal pain and diarrhea  Genitourinary: Positive for flank pain  Negative for dysuria and hematuria  Musculoskeletal: Negative for neck pain  Skin: Negative for rash  Neurological: Negative for dizziness, weakness and headaches  Psychiatric/Behavioral: Negative for suicidal ideas  All other systems reviewed and are negative  Physical Exam  ED Triage Vitals   Temperature Pulse Respirations Blood Pressure SpO2   02/13/18 2035 02/13/18 2035 02/13/18 2035 02/13/18 2035 02/13/18 2035   (!) 97 4 °F (36 3 °C) 66 20 134/66 100 %      Temp Source Heart Rate Source Patient Position - Orthostatic VS BP Location FiO2 (%)   02/13/18 2035 02/13/18 2035 02/13/18 2226 02/13/18 2035 --   Temporal Monitor Lying Right arm       Pain Score       02/13/18 2035       Worst Possible Pain           Orthostatic Vital Signs  Vitals:    02/13/18 2035 02/13/18 2226   BP: 134/66 115/57   Pulse: 66 63   Patient Position - Orthostatic VS:  Lying       Physical Exam   Constitutional: She is oriented to person, place, and time  Vital signs are normal  She appears well-developed and well-nourished  Non-toxic appearance     HENT:   Head: Normocephalic and atraumatic  Right Ear: Tympanic membrane and external ear normal    Left Ear: Tympanic membrane and external ear normal    Nose: Nose normal    Mouth/Throat: Oropharynx is clear and moist  Mucous membranes are dry  Eyes: Conjunctivae and EOM are normal  Pupils are equal, round, and reactive to light  Neck: Normal range of motion and full passive range of motion without pain  Neck supple  No Brudzinski's sign and no Kernig's sign noted  Cardiovascular: Normal rate, regular rhythm, normal heart sounds, intact distal pulses and normal pulses  No murmur heard  Pulmonary/Chest: Effort normal and breath sounds normal  No tachypnea  No respiratory distress  She has no wheezes  Abdominal: Soft  Bowel sounds are normal  She exhibits no distension  There is no tenderness  There is no rigidity, no rebound and no guarding  Musculoskeletal: Normal range of motion  Right lower leg: She exhibits no swelling  Left lower leg: She exhibits no swelling  Lymphadenopathy:     She has no cervical adenopathy  Neurological: She is alert and oriented to person, place, and time  She has normal strength and normal reflexes  No cranial nerve deficit or sensory deficit  Coordination and gait normal  GCS eye subscore is 4  GCS verbal subscore is 5  GCS motor subscore is 6  Skin: Skin is warm and dry  No rash noted  She is not diaphoretic  No pallor  Psychiatric: She has a normal mood and affect  Her speech is normal and behavior is normal  Judgment and thought content normal  Cognition and memory are normal    Nursing note and vitals reviewed        ED Medications  Medications   HYDROmorphone (DILAUDID) injection 1 mg (1 mg Intravenous Given 2/13/18 2147)   ondansetron (ZOFRAN) injection 4 mg (4 mg Intravenous Given 2/13/18 2147)   sodium chloride 0 9 % bolus 1,000 mL (0 mL Intravenous Stopped 2/13/18 2334)   ketorolac (TORADOL) injection 15 mg (15 mg Intravenous Given 2/13/18 2147)   oxyCODONE (Bucio Brandon) IR tablet 5 mg (5 mg Oral Given 2/13/18 2334)   ondansetron (ZOFRAN-ODT) dispersible tablet 4 mg (4 mg Oral Given 2/13/18 2334)       Diagnostic Studies  Results Reviewed     Procedure Component Value Units Date/Time    Basic metabolic panel [[id number]]  (Abnormal) Collected:  02/13/18 2227    Lab Status:  Final result Specimen:  Blood from Arm, Right Updated:  02/13/18 2250     Sodium 140 mmol/L      Potassium 3 9 mmol/L      Chloride 108 mmol/L      CO2 25 mmol/L      Anion Gap 7 mmol/L      BUN 14 mg/dL      Creatinine 0 81 mg/dL      Glucose 110 mg/dL      Calcium 8 2 (L) mg/dL      eGFR 87 ml/min/1 73sq m     Narrative:         National Kidney Disease Education Program recommendations are as follows:  GFR calculation is accurate only with a steady state creatinine  Chronic Kidney disease less than 60 ml/min/1 73 sq  meters  Kidney failure less than 15 ml/min/1 73 sq  meters      CBC and differential [[id number]]  (Abnormal) Collected:  02/13/18 2227    Lab Status:  Final result Specimen:  Blood from Arm, Right Updated:  02/13/18 2241     WBC 9 31 Thousand/uL      RBC 4 67 Million/uL      Hemoglobin 13 0 g/dL      Hematocrit 39 7 %      MCV 85 fL      MCH 27 8 pg      MCHC 32 7 g/dL      RDW 13 4 %      MPV 9 3 fL      Platelets 952 Thousands/uL      nRBC 0 /100 WBCs      Neutrophils Relative 57 %      Lymphocytes Relative 26 %      Monocytes Relative 7 %      Eosinophils Relative 10 (H) %      Basophils Relative 0 %      Neutrophils Absolute 5 33 Thousands/µL      Lymphocytes Absolute 2 37 Thousands/µL      Monocytes Absolute 0 65 Thousand/µL      Eosinophils Absolute 0 93 (H) Thousand/µL      Basophils Absolute 0 03 Thousands/µL     Urine Microscopic [[id number]]  (Abnormal) Collected:  02/13/18 2157    Lab Status:  Final result Specimen:  Urine from Urine, Clean Catch Updated:  02/13/18 2227     RBC, UA 10-20 (A) /hpf      WBC, UA 4-10 (A) /hpf      Epithelial Cells Moderate (A) /hpf      Bacteria, UA Occasional /hpf      AMORPH PHOSPATES Occasional /hpf      MUCOUS THREADS Moderate    POCT urinalysis dipstick [[id number]]  (Abnormal) Resulted:  02/13/18 2157    Lab Status:  Final result Specimen:  Urine Updated:  02/13/18 2158    ED Urine Macroscopic [[id number]]  (Abnormal) Collected:  02/13/18 2157    Lab Status:  Final result Specimen:  Urine Updated:  02/13/18 2157     Color, UA Yellow     Clarity, UA Slightly Cloudy     pH, UA 7 0     Leukocytes, UA Negative     Nitrite, UA Negative     Protein, UA >=300 (A) mg/dl      Glucose, UA Negative mg/dl      Ketones, UA Negative mg/dl      Urobilinogen, UA 0 2 E U /dl      Bilirubin, UA Negative     Blood, UA Moderate (A)     Specific [place], UA 1 025    Narrative:       CLIN[facility]K RESULT                 No orders to display              Procedures  Procedures       Phone Contacts  ED Phone Contact    ED Course  ED Course as of Feb 15 1605   Tue Feb 13, 2018   2317 Pain is controlled and she has no new complications with regard to known renal colic                                MDM  CritCare Time    Disposition  Final diagnoses:   Renal colic on right side     Time reflects when diagnosis was documented in both MDM as applicable and the Disposition within this note     Time User Action Codes Description Comment    2/13/2018 11:18 PM Clarita Rubin Add [S34] Renal colic on right side       ED Disposition     ED Disposition Condition Comment    Discharge  Andrew Hwang discharge to home/self care      Condition at discharge: Good        Follow-up Information     Follow up With Specialties Details Why Elizabeth You MD Urology Go to As scheduled [address] [place], [place] [phone]          Discharge Medication List as of 2/13/2018 11:19 PM      START taking these medications    Details   ondansetron (ZOFRAN) 4 mg tablet Take 1 tablet (4 mg total) by mouth every 6 (six) hours for 7 days, Starting Tue 2/13/2018, Until Tue 2/20/2018, Normal         CONTINUE these medications which have NOT CHANGED    Details   acetaminophen (TYLENOL) 325 mg tablet Take 2 tablets by mouth every 6 (six) hours as needed for mild pain or moderate pain, Starting Tue 1/23/2018, No Print      cholecalciferol (VITAMIN D3) 1,000 units tablet Take by mouth, Historical Med      cyclobenzaprine (FLEXERIL) 10 mg tablet Take 1 tablet by mouth 3 (three) times a day as needed, Starting Sun 7/10/2016, Historical Med      estradiol (ESTRACE) 2 MG tablet Take 2 mg by mouth daily, Starting Wed 12/13/2017, Historical Med      topiramate (TOPAMAX) 25 mg tablet Starting Tue 1/23/2018, Historical Med           No discharge procedures on file      ED Provider  Electronically Signed by           Shaylee Fallon MD  02/15/18 0551

## 2018-02-14 NOTE — ED NOTES
Patient ambulatory to bathroom without difficulty  Steady gait        Micheline Kurtz, GREGG  02/13/18 7621

## 2018-02-14 NOTE — DISCHARGE INSTRUCTIONS
Ureteral Stones   WHAT YOU NEED TO KNOW:   A ureteral stone is a stone that forms in the kidney and moves down the ureter and gets stuck there  The ureter is the tube that takes urine from the kidney to the bladder  Stones can form in the urinary system when your urine has high levels of minerals and salts  Urinary stones can be made of uric acid, calcium, phosphate, or oxalate crystals  DISCHARGE INSTRUCTIONS:   Return to the emergency department if:   · You have severe pain that does not improve, even after you take medicine  · You have vomiting that is not relieved by medicine  · You develop a fever  Contact your healthcare provider if:   · You develop a fever  · You have any questions or concerns about your condition or care  Strain your urine every time you go to the bathroom  Urinate through a strainer or a piece of thin cloth to catch the stones  Take the stones to your healthcare provider so they can be sent to the lab for tests  This will help your healthcare providers plan the best treatment for you

## 2018-02-15 ENCOUNTER — TELEPHONE (OUTPATIENT)
Dept: UROLOGY | Facility: AMBULATORY SURGERY CENTER | Age: 48
End: 2018-02-15

## 2018-02-15 NOTE — TELEPHONE ENCOUNTER
PT STATES THAT ANOTHER DOCTOR ORDERED A URINE C&S AND RESULTS WERE POSITIVE  WE DO NOT HAVE THOSE RESULTS  INSTRUCTED PT TO CALL  BACK AND FAX RESULTS HERE OR THEY SHOULD ORDER ABX IF POSITIVE

## 2018-02-16 ENCOUNTER — ANESTHESIA (OUTPATIENT)
Dept: PERIOP | Facility: HOSPITAL | Age: 48
End: 2018-02-16
Payer: COMMERCIAL

## 2018-02-16 ENCOUNTER — HOSPITAL ENCOUNTER (OUTPATIENT)
Dept: RADIOLOGY | Facility: HOSPITAL | Age: 48
Setting detail: OUTPATIENT SURGERY
Discharge: HOME | End: 2018-02-16
Payer: COMMERCIAL

## 2018-02-16 ENCOUNTER — HOSPITAL ENCOUNTER (OUTPATIENT)
Facility: HOSPITAL | Age: 48
Setting detail: OUTPATIENT SURGERY
Discharge: HOME | End: 2018-02-16
Attending: UROLOGY | Admitting: UROLOGY
Payer: COMMERCIAL

## 2018-02-16 VITALS
HEIGHT: 59 IN | TEMPERATURE: 97.9 F | SYSTOLIC BLOOD PRESSURE: 111 MMHG | DIASTOLIC BLOOD PRESSURE: 56 MMHG | BODY MASS INDEX: 35.68 KG/M2 | HEART RATE: 76 BPM | RESPIRATION RATE: 16 BRPM | WEIGHT: 177 LBS | OXYGEN SATURATION: 94 %

## 2018-02-16 DIAGNOSIS — N20.0 KIDNEY STONES: Primary | ICD-10-CM

## 2018-02-16 DIAGNOSIS — N20.0 KIDNEY STONES: ICD-10-CM

## 2018-02-16 PROCEDURE — 74450 X-RAY URETHRA/BLADDER: CPT

## 2018-02-16 PROCEDURE — 52332 CYSTOSCOPY AND TREATMENT: CPT | Performed by: UROLOGY

## 2018-02-16 PROCEDURE — C1894 INTRO/SHEATH, NON-LASER: HCPCS | Performed by: UROLOGY

## 2018-02-16 PROCEDURE — C1769 GUIDE WIRE: HCPCS | Performed by: UROLOGY

## 2018-02-16 PROCEDURE — C2625 STENT, NON-COR, TEM W/DEL SY: HCPCS | Performed by: UROLOGY

## 2018-02-16 PROCEDURE — 52351 CYSTOURETERO & OR PYELOSCOPE: CPT | Performed by: UROLOGY

## 2018-02-16 PROCEDURE — C1726 CATH, BAL DIL, NON-VASCULAR: HCPCS | Performed by: UROLOGY

## 2018-02-16 DEVICE — KWART RETRO-INJECT URETERAL STENT SET
Type: IMPLANTABLE DEVICE | Site: URETER | Status: FUNCTIONAL
Brand: KWART

## 2018-02-16 RX ORDER — OXYBUTYNIN CHLORIDE 5 MG/1
5 TABLET ORAL EVERY 6 HOURS PRN
Status: DISCONTINUED | OUTPATIENT
Start: 2018-02-16 | End: 2018-02-16 | Stop reason: HOSPADM

## 2018-02-16 RX ORDER — ONDANSETRON 2 MG/ML
4 INJECTION INTRAMUSCULAR; INTRAVENOUS ONCE AS NEEDED
Status: DISCONTINUED | OUTPATIENT
Start: 2018-02-16 | End: 2018-02-16 | Stop reason: HOSPADM

## 2018-02-16 RX ORDER — CEPHALEXIN 500 MG/1
500 CAPSULE ORAL 3 TIMES DAILY
Qty: 15 CAPSULE | Refills: 0 | Status: SHIPPED | OUTPATIENT
Start: 2018-02-16 | End: 2018-02-21

## 2018-02-16 RX ORDER — PROPOFOL 10 MG/ML
INJECTION, EMULSION INTRAVENOUS AS NEEDED
Status: DISCONTINUED | OUTPATIENT
Start: 2018-02-16 | End: 2018-02-16 | Stop reason: SURG

## 2018-02-16 RX ORDER — ROCURONIUM BROMIDE 10 MG/ML
INJECTION, SOLUTION INTRAVENOUS AS NEEDED
Status: DISCONTINUED | OUTPATIENT
Start: 2018-02-16 | End: 2018-02-16 | Stop reason: SURG

## 2018-02-16 RX ORDER — GLYCOPYRROLATE 0.2 MG/ML
INJECTION INTRAMUSCULAR; INTRAVENOUS AS NEEDED
Status: DISCONTINUED | OUTPATIENT
Start: 2018-02-16 | End: 2018-02-16 | Stop reason: SURG

## 2018-02-16 RX ORDER — MEPERIDINE HYDROCHLORIDE 50 MG/ML
12.5 INJECTION INTRAMUSCULAR; INTRAVENOUS; SUBCUTANEOUS ONCE AS NEEDED
Status: DISCONTINUED | OUTPATIENT
Start: 2018-02-16 | End: 2018-02-16 | Stop reason: HOSPADM

## 2018-02-16 RX ORDER — SODIUM CHLORIDE 9 MG/ML
125 INJECTION, SOLUTION INTRAVENOUS CONTINUOUS
Status: DISCONTINUED | OUTPATIENT
Start: 2018-02-16 | End: 2018-02-16 | Stop reason: HOSPADM

## 2018-02-16 RX ORDER — HYDROCODONE BITARTRATE AND ACETAMINOPHEN 5; 325 MG/1; MG/1
1 TABLET ORAL EVERY 4 HOURS PRN
Qty: 10 TABLET | Refills: 0 | Status: SHIPPED | OUTPATIENT
Start: 2018-02-16 | End: 2018-02-21

## 2018-02-16 RX ORDER — DEXAMETHASONE SODIUM PHOSPHATE 4 MG/ML
INJECTION, SOLUTION INTRA-ARTICULAR; INTRALESIONAL; INTRAMUSCULAR; INTRAVENOUS; SOFT TISSUE AS NEEDED
Status: DISCONTINUED | OUTPATIENT
Start: 2018-02-16 | End: 2018-02-16 | Stop reason: SURG

## 2018-02-16 RX ORDER — ACETAMINOPHEN 325 MG/1
650 TABLET ORAL EVERY 6 HOURS PRN
Status: DISCONTINUED | OUTPATIENT
Start: 2018-02-16 | End: 2018-02-16 | Stop reason: HOSPADM

## 2018-02-16 RX ORDER — OXYCODONE HYDROCHLORIDE AND ACETAMINOPHEN 5; 325 MG/1; MG/1
2 TABLET ORAL EVERY 4 HOURS PRN
Status: DISCONTINUED | OUTPATIENT
Start: 2018-02-16 | End: 2018-02-16 | Stop reason: HOSPADM

## 2018-02-16 RX ORDER — FENTANYL CITRATE 50 UG/ML
INJECTION, SOLUTION INTRAMUSCULAR; INTRAVENOUS AS NEEDED
Status: DISCONTINUED | OUTPATIENT
Start: 2018-02-16 | End: 2018-02-16 | Stop reason: SURG

## 2018-02-16 RX ORDER — OXYCODONE HYDROCHLORIDE 5 MG/1
5 TABLET ORAL EVERY 4 HOURS PRN
COMMUNITY
End: 2018-04-15

## 2018-02-16 RX ORDER — FENTANYL CITRATE/PF 50 MCG/ML
50 SYRINGE (ML) INJECTION
Status: DISCONTINUED | OUTPATIENT
Start: 2018-02-16 | End: 2018-02-16 | Stop reason: HOSPADM

## 2018-02-16 RX ORDER — ONDANSETRON 2 MG/ML
INJECTION INTRAMUSCULAR; INTRAVENOUS AS NEEDED
Status: DISCONTINUED | OUTPATIENT
Start: 2018-02-16 | End: 2018-02-16 | Stop reason: SURG

## 2018-02-16 RX ORDER — OXYCODONE HYDROCHLORIDE AND ACETAMINOPHEN 5; 325 MG/1; MG/1
1 TABLET ORAL EVERY 4 HOURS PRN
Status: DISCONTINUED | OUTPATIENT
Start: 2018-02-16 | End: 2018-02-16 | Stop reason: HOSPADM

## 2018-02-16 RX ORDER — MIDAZOLAM HYDROCHLORIDE 1 MG/ML
INJECTION INTRAMUSCULAR; INTRAVENOUS AS NEEDED
Status: DISCONTINUED | OUTPATIENT
Start: 2018-02-16 | End: 2018-02-16 | Stop reason: SURG

## 2018-02-16 RX ORDER — MORPHINE SULFATE 4 MG/ML
4 INJECTION, SOLUTION INTRAMUSCULAR; INTRAVENOUS EVERY 2 HOUR PRN
Status: DISCONTINUED | OUTPATIENT
Start: 2018-02-16 | End: 2018-02-16 | Stop reason: HOSPADM

## 2018-02-16 RX ADMIN — PROPOFOL 150 MG: 10 INJECTION, EMULSION INTRAVENOUS at 09:14

## 2018-02-16 RX ADMIN — FENTANYL CITRATE 50 MCG: 50 INJECTION, SOLUTION INTRAMUSCULAR; INTRAVENOUS at 09:35

## 2018-02-16 RX ADMIN — ONDANSETRON HYDROCHLORIDE 4 MG: 2 INJECTION, SOLUTION INTRAVENOUS at 09:11

## 2018-02-16 RX ADMIN — DEXAMETHASONE SODIUM PHOSPHATE 4 MG: 4 INJECTION, SOLUTION INTRAMUSCULAR; INTRAVENOUS at 09:19

## 2018-02-16 RX ADMIN — ROCURONIUM BROMIDE 35 MG: 10 INJECTION INTRAVENOUS at 09:14

## 2018-02-16 RX ADMIN — SODIUM CHLORIDE 125 ML/HR: 0.9 INJECTION, SOLUTION INTRAVENOUS at 07:46

## 2018-02-16 RX ADMIN — LIDOCAINE HYDROCHLORIDE 100 MG: 20 INJECTION, SOLUTION INTRAVENOUS at 09:14

## 2018-02-16 RX ADMIN — NEOSTIGMINE METHYLSULFATE 4 MG: 1 INJECTION, SOLUTION INTRAMUSCULAR; INTRAVENOUS; SUBCUTANEOUS at 10:00

## 2018-02-16 RX ADMIN — SODIUM CHLORIDE: 0.9 INJECTION, SOLUTION INTRAVENOUS at 09:30

## 2018-02-16 RX ADMIN — GLYCOPYRROLATE 0.8 MG: 0.2 INJECTION, SOLUTION INTRAMUSCULAR; INTRAVENOUS at 10:00

## 2018-02-16 RX ADMIN — OXYCODONE HYDROCHLORIDE AND ACETAMINOPHEN 2 TABLET: 5; 325 TABLET ORAL at 11:23

## 2018-02-16 RX ADMIN — CEFAZOLIN SODIUM 2000 MG: 2 SOLUTION INTRAVENOUS at 09:18

## 2018-02-16 RX ADMIN — MIDAZOLAM HYDROCHLORIDE 2 MG: 1 INJECTION, SOLUTION INTRAMUSCULAR; INTRAVENOUS at 09:11

## 2018-02-16 RX ADMIN — FENTANYL CITRATE 100 MCG: 50 INJECTION, SOLUTION INTRAMUSCULAR; INTRAVENOUS at 09:14

## 2018-02-16 NOTE — DISCHARGE SUMMARY
Discharge Summary - Nikky Garcia 52 y o  female MRN: [id number]    Admission Date: 2/16/2018     Admitting Diagnosis: Kidney stones [N20 0]  Ureteral calculus, right [N20 1]    Procedures Performed: right ureteroscopy, stent    Patient underwent planned outpt surgery and recovered without complication  Discharged in good condition  Medications were prescribed  Pt knows to call the office for followup in 3 days

## 2018-02-16 NOTE — OP NOTE
OPERATIVE REPORT  PATIENT NAME: Luli Roe    :  1970  MRN: [id number]  Pt Location: [facility] OR ROOM 06    SURGERY DATE: 2018    Surgeon(s) and Role:     * Bert Rubio MD - Primary    Preop Diagnosis:  Kidney stones [N20 0]  Ureteral calculus, right [N20 1]    Post-Op Diagnosis Codes:     * Kidney stones [N20 0]     * Ureteral calculus, right [N20 1]    Procedure(s) (LRB):  CYSTOSCOPY RIGHT URETEROSCOPY WITH , RIGTH RETROGRADE PYELOGRAM AND IRIGHT URETERAL STENT EXCHANGE (Right)    Specimen(s):  * No specimens in log *    Estimated Blood Loss:   0 mL    Drains:  Ureteral Drain/Stent Right ureter 6 Fr  (Active)   Site Assessment SHIRA 2018 10:15 AM   Securement Method Other (Comment) 2018 10:15 AM   Number of days: 0       Anesthesia Type:   General  Operative Indications:  Kidney stones [N20 0]  Ureteral calculus, right [N20 1]      Operative Findings:  No stone in ureter, several small stones lower pole right kidney    Complications:   None    Procedure and Technique:  After the patient was placed under adequate general anesthesia, she was prepped and draped using Betadine solution in lithotomy position  The 25 [place] scope was passed without difficulty in the urethra which had no stenosis  The bladder had mild inflammation from the old stent  The stent was grasped, withdrawn, and wired  A rigid ureteral scope was passed alongside the wire to inspect the ureter, no stones were seen in the ureter  Presumably the stone that had been in the ureter last week had passed between then and now  The flexible ureteral scope was then passed through a sheath up into the renal pelvis  Careful examination showed several small stone particles in the lower pole calices  One collection of 4 mm or so in aggregate, was small stones that were adherent to the calyx  I used a wire to dislodge them  It did not need a laser to break them up    Thorough inspection showed no other large stones anywhere in the collecting system  Scope was removed, a six [place] stent was passed without difficulty, retrograde pyelogram showing moderate hydronephrosis, and patient taken to recovery in good condition     I was present for the entire procedure    Patient Disposition:  PACU     SIGNATURE: Adrain Phalen, MD  DATE: February 16, 2018  TIME: 10:36 AM

## 2018-02-16 NOTE — DISCHARGE INSTRUCTIONS
Ureteral Stent Placement   WHAT YOU NEED TO KNOW:   Ureteral stent placement is a procedure to open a blocked or narrow ureter  The ureter is the tube that carries urine from your kidney into your bladder  A stent is a thin hollow plastic tube used to hold your ureter open and allow urine to flow  The stent may stay in for several weeks  DISCHARGE INSTRUCTIONS:   Medicines:   · Pain medicine  may be given to take away or decrease pain  Do not wait until the pain is severe before you take your medicine  · Antibiotics  help prevent infections  Your healthcare provider may prescribe these for you while your stent remains in  · Take your medicine as directed  Contact your healthcare provider if you think your medicine is not helping or if you have side effects  Tell him or her if you are allergic to any medicine  Keep a list of the medicines, vitamins, and herbs you take  Include the amounts, and when and why you take them  Bring the list or the pill bottles to follow-up visits  Carry your medicine list with you in case of an emergency  Follow up with your urologist as directed: You will need regular follow-up visits with your urologist as long as the stent remains in  He will check to make sure the stent is working properly  He may do urine cultures to check for infection  Write down your questions so you remember to ask them during your visits  Self-care:   · Drink liquids  as directed  Ask your healthcare provider how much liquid to drink each day and which liquids are best for you  Fluids such as cranberry or apple juice may be especially helpful to prevent urinary infections  · Return to normal activities  the day after your stent placement or as directed by your healthcare provider  · You may take a shower  the day after your stent placement if your healthcare provider says it is okay  Contact your healthcare provider or urologist if:   · You have a fever or chills      · You feel like you need to urinate often  · You have pain when you urinate or pain around your bladder or kidney  · You see blood in your urine or it looks cloudy  · You have questions or concerns about your condition or care  Seek care immediately or call 911 if:   · You urinate little or not at all  · You have severe pain in your abdomen  © 2017 [employer] Information is for End User's use only and may not be sold, redistributed or otherwise used for commercial purposes  All illustrations and images included in CareNotes® are the copyrighted property of [employer]  or [employer]  The above information is an  only  It is not intended as medical advice for individual conditions or treatments  Talk to your doctor, nurse or pharmacist before following any medical regimen to see if it is safe and effective for you

## 2018-02-16 NOTE — ANESTHESIA PREPROCEDURE EVALUATION
Review of Systems/Medical History  Patient summary reviewed  Chart reviewed      Cardiovascular  Exercise tolerance: good,     Pulmonary  Negative pulmonary ROS        GI/Hepatic       Kidney stones,   Comment: Single kidney       Endo/Other  Negative endo/other ROS      GYN  Negative gynecology ROS          Hematology  Negative hematology ROS      Musculoskeletal  Negative musculoskeletal ROS        Neurology  Negative neurology ROS      Psychology   Negative psychology ROS              Physical Exam    Airway    Mallampati score: II  TM Distance: <3 FB  Neck ROM: full     Dental       Cardiovascular  Rhythm: regular, Rate: normal,     Pulmonary  Breath sounds clear to auscultation,     Other Findings        Anesthesia Plan  ASA Score- 2     Anesthesia Type- general with ASA Monitors  Additional Monitors:   Airway Plan:         Plan Factors- Patient instructed to abstain from smoking on day of procedure  Patient did not smoke on day of surgery  Induction- intravenous  Postoperative Plan-     Informed Consent- Anesthetic plan and risks discussed with patient

## 2018-02-16 NOTE — DISCHARGE INSTR - AVS FIRST PAGE
ALL YOUR  PREVIOUS MEDS ARE THE SAME  NEW MEDS: cephalexin, pain meds  BE CAREFUL NOT TO PULL THE STRING  EXPECT SOME BLOOD IN URINE, BURNING, FREQUENT URINATION

## 2018-02-19 ENCOUNTER — TRANSCRIBE ORDERS (OUTPATIENT)
Dept: ADMINISTRATIVE | Facility: HOSPITAL | Age: 48
End: 2018-02-19

## 2018-02-19 DIAGNOSIS — N20.0 KIDNEY STONE: Primary | ICD-10-CM

## 2018-02-20 ENCOUNTER — APPOINTMENT (OUTPATIENT)
Dept: LAB | Facility: HOSPITAL | Age: 48
End: 2018-02-20
Attending: UROLOGY
Payer: COMMERCIAL

## 2018-02-20 PROCEDURE — 82131 AMINO ACIDS SINGLE QUANT: CPT

## 2018-02-20 PROCEDURE — 82570 ASSAY OF URINE CREATININE: CPT

## 2018-02-20 PROCEDURE — 82507 ASSAY OF CITRATE: CPT

## 2018-02-20 PROCEDURE — 82340 ASSAY OF CALCIUM IN URINE: CPT

## 2018-02-20 PROCEDURE — 83945 ASSAY OF OXALATE: CPT

## 2018-02-20 PROCEDURE — 81003 URINALYSIS AUTO W/O SCOPE: CPT

## 2018-02-20 PROCEDURE — 84105 ASSAY OF URINE PHOSPHORUS: CPT

## 2018-02-20 PROCEDURE — 84560 ASSAY OF URINE/URIC ACID: CPT

## 2018-02-20 PROCEDURE — 83735 ASSAY OF MAGNESIUM: CPT

## 2018-02-20 PROCEDURE — 82140 ASSAY OF AMMONIA: CPT

## 2018-02-20 PROCEDURE — 82436 ASSAY OF URINE CHLORIDE: CPT

## 2018-02-20 PROCEDURE — 84392 ASSAY OF URINE SULFATE: CPT

## 2018-02-20 PROCEDURE — 84300 ASSAY OF URINE SODIUM: CPT

## 2018-02-20 PROCEDURE — 84133 ASSAY OF URINE POTASSIUM: CPT

## 2018-02-20 PROCEDURE — 83935 ASSAY OF URINE OSMOLALITY: CPT

## 2018-02-27 LAB
AMMONIA 24H UR-MRATE: 29 MEQ/24 HR
AMMONIA UR-SCNC: ABNORMAL UG/DL
CA H2 PHOS DIHYD CRY URNS QL MICRO: 1.9 RATIO (ref 0–3)
CALCIUM 24H UR-MCNC: 9.3 MG/DL
CALCIUM 24H UR-MRATE: 176.7 MG/24 HR (ref 100–300)
CHLORIDE 24H UR-SCNC: 67 MMOL/L
CHLORIDE 24H UR-SRATE: 127 MMOL/24 HR (ref 110–250)
CITRATE 24H UR-MCNC: 175 MG/L
CITRATE 24H UR-MRATE: 333 MG/24 HR (ref 320–1240)
COM CRY STONE QL IR: 3.36 RATIO (ref 0–6)
CREAT 24H UR-MCNC: 63 MG/DL
CREAT 24H UR-MRATE: 1197 MG/24 HR (ref 800–1800)
CYSTINE 24H UR-MCNC: 3.74 MG/L
CYSTINE 24H UR-MRATE: 7.11 MG/24 HR (ref 10–100)
MAGNESIUM 24H UR-MRATE: 99 MG/24 HR (ref 12–293)
MAGNESIUM UR-MCNC: 5.2 MG/DL
NA URATE CRY STONE QL IR: 2.88 RATIO (ref 0–4)
OSMOLALITY UR: 412 MOSMOL/KG (ref 300–900)
OXALATE 24H UR-MRATE: 21 MG/24 HR (ref 4–31)
OXALATE UR-MCNC: 11 MG/L
PH 24H UR: 6.5 [PH]
PHOSPHATE 24H UR-MCNC: 36.1 MG/DL
PHOSPHATE 24H UR-MRATE: 685.9 MG/24 HR (ref 400–1300)
PLEASE NOTE (STONE RISK): ABNORMAL
POTASSIUM 24H UR-SCNC: 27.2 MMOL/24 HR (ref 25–125)
POTASSIUM UR-SCNC: 14.3 MMOL/L
PRESERVED URINE: 1900 ML/24 HR (ref 600–1600)
SODIUM 24H UR-SCNC: 96 MMOL/L
SODIUM 24H UR-SRATE: 182 MMOL/24 HR (ref 39–258)
SPECIMEN VOL 24H UR: 1900 ML/24 HR (ref 600–1600)
SULFATE 24H UR-MCNC: 21 MEQ/24 HR (ref 0–30)
SULFATE UR-MCNC: 11 MEQ/L
TRI-PHOS CRY STONE MICRO: 0.07 RATIO (ref 0–1)
URATE 24H UR-MCNC: 25.7 MG/DL
URATE 24H UR-MRATE: 488 MG/24 HR (ref 250–750)
URATE DIHYD CRY STONE QL IR: 0.38 RATIO (ref 0–1.2)

## 2018-03-29 LAB
ABSOL LYMPHOCYTES (HISTORICAL): 0.9 K/UL (ref 0.5–4)
ALBUMIN SERPL BCP-MCNC: 4.2 G/DL (ref 3–5.2)
ALP SERPL-CCNC: 70 U/L (ref 43–122)
ALT SERPL W P-5'-P-CCNC: 26 U/L (ref 9–52)
ANION GAP SERPL CALCULATED.3IONS-SCNC: 12 MMOL/L (ref 5–14)
AST SERPL W P-5'-P-CCNC: 17 U/L (ref 14–36)
BANDS (HISTORICAL): 5 % (ref 3–11)
BASOPHILS # BLD AUTO: 0.2 K/UL (ref 0–0.1)
BASOPHILS # BLD AUTO: 2 % (ref 0–1)
BILIRUB SERPL-MCNC: 0.3 MG/DL
BUN SERPL-MCNC: 14 MG/DL (ref 5–25)
CALCIUM SERPL-MCNC: 9.2 MG/DL (ref 8.4–10.2)
CHLORIDE SERPL-SCNC: 105 MEQ/L (ref 97–108)
CHOLEST SERPL-MCNC: 208 MG/DL
CHOLEST/HDLC SERPL: 3.5 {RATIO}
CO2 SERPL-SCNC: 24 MMOL/L (ref 22–30)
COMMENT (HISTORICAL): ABNORMAL
CREATINE, SERUM (HISTORICAL): 0.82 MG/DL (ref 0.6–1.2)
DEPRECATED RDW RBC AUTO: 13.9 %
EGFR (HISTORICAL): >60 ML/MIN/1.73 M2
EOSINOPHIL # BLD AUTO: 0.5 K/UL (ref 0–0.4)
EOSINOPHIL NFR BLD AUTO: 7 % (ref 0–6)
GLUCOSE SERPL-MCNC: 91 MG/DL (ref 70–99)
HCT VFR BLD AUTO: 44.2 % (ref 36–46)
HDLC SERPL-MCNC: 59 MG/DL
HGB BLD-MCNC: 14.3 G/DL (ref 12–16)
LDL/HDL RATIO (HISTORICAL): 2.1
LDLC SERPL CALC-MCNC: 123 MG/DL
LYMPHOCYTES NFR BLD AUTO: 12 % (ref 25–45)
MCH RBC QN AUTO: 27.3 PG (ref 26–34)
MCHC RBC AUTO-ENTMCNC: 32.4 % (ref 31–36)
MCV RBC AUTO: 84 FL (ref 80–100)
MONOCYTES # BLD AUTO: 0.5 K/UL (ref 0.2–0.9)
MONOCYTES NFR BLD AUTO: 7 % (ref 1–10)
NEUTROPHILS ABS COUNT (HISTORICAL): 5.4 K/UL (ref 1.8–7.8)
NEUTS SEG NFR BLD AUTO: 67 % (ref 45–65)
PLATELET # BLD AUTO: 237 K/MCL (ref 150–450)
POTASSIUM SERPL-SCNC: 4 MEQ/L (ref 3.6–5)
RBC # BLD AUTO: 5.25 M/MCL (ref 4–5.2)
RBC MORPHOLOGY (HISTORICAL): ABNORMAL
SODIUM SERPL-SCNC: 141 MEQ/L (ref 137–147)
TOTAL PROTEIN (HISTORICAL): 7.1 G/DL (ref 5.9–8.4)
TRIGL SERPL-MCNC: 129 MG/DL
TSH SERPL DL<=0.05 MIU/L-ACNC: 1.93 UIU/ML (ref 0.47–4.68)
VLDLC SERPL CALC-MCNC: 26 MG/DL (ref 0–40)
WBC # BLD AUTO: 7.5 K/MCL (ref 4.5–11)

## 2018-03-30 LAB
EST. AVERAGE GLUCOSE BLD GHB EST-MCNC: 128 MG/DL
HBA1C MFR BLD HPLC: 6.1 %
HEPATITIS A IGM ANTIBODY (HISTORICAL): NORMAL
HEPATITIS B CORE IGM ANTIBODY (HISTORICAL): NORMAL
HEPATITIS B SURFACE AG CONFIRMATION (HISTORICAL): NORMAL
HEPATITIS C ANTIBODY (HISTORICAL): NORMAL

## 2018-04-11 ENCOUNTER — HOSPITAL ENCOUNTER (EMERGENCY)
Facility: HOSPITAL | Age: 48
Discharge: HOME | End: 2018-04-11
Attending: EMERGENCY MEDICINE | Admitting: EMERGENCY MEDICINE
Payer: COMMERCIAL

## 2018-04-11 ENCOUNTER — TELEPHONE (OUTPATIENT)
Dept: UROLOGY | Facility: AMBULATORY SURGERY CENTER | Age: 48
End: 2018-04-11

## 2018-04-11 ENCOUNTER — OFFICE VISIT (OUTPATIENT)
Dept: URGENT CARE | Age: 48
End: 2018-04-11
Payer: COMMERCIAL

## 2018-04-11 ENCOUNTER — APPOINTMENT (EMERGENCY)
Dept: RADIOLOGY | Facility: HOSPITAL | Age: 48
End: 2018-04-11
Payer: COMMERCIAL

## 2018-04-11 VITALS
BODY MASS INDEX: 36.29 KG/M2 | HEIGHT: 59 IN | HEART RATE: 83 BPM | WEIGHT: 180 LBS | TEMPERATURE: 98.8 F | RESPIRATION RATE: 18 BRPM | OXYGEN SATURATION: 98 % | SYSTOLIC BLOOD PRESSURE: 130 MMHG | DIASTOLIC BLOOD PRESSURE: 83 MMHG

## 2018-04-11 VITALS
HEART RATE: 78 BPM | WEIGHT: 180 LBS | TEMPERATURE: 97.7 F | SYSTOLIC BLOOD PRESSURE: 123 MMHG | DIASTOLIC BLOOD PRESSURE: 60 MMHG | BODY MASS INDEX: 36.29 KG/M2 | RESPIRATION RATE: 16 BRPM | OXYGEN SATURATION: 96 % | HEIGHT: 59 IN

## 2018-04-11 DIAGNOSIS — Q60.0 SOLITARY KIDNEY, CONGENITAL: ICD-10-CM

## 2018-04-11 DIAGNOSIS — R10.31 RIGHT LOWER QUADRANT ABDOMINAL PAIN: Primary | ICD-10-CM

## 2018-04-11 DIAGNOSIS — R10.9 ACUTE RIGHT FLANK PAIN: ICD-10-CM

## 2018-04-11 DIAGNOSIS — K80.20 CHOLELITHIASIS: Primary | ICD-10-CM

## 2018-04-11 DIAGNOSIS — K76.0 HEPATIC STEATOSIS: ICD-10-CM

## 2018-04-11 LAB
ALBUMIN SERPL BCP-MCNC: 3.8 G/DL (ref 3.5–5)
ALP SERPL-CCNC: 68 U/L (ref 46–116)
ALT SERPL W P-5'-P-CCNC: 22 U/L (ref 12–78)
ANION GAP SERPL CALCULATED.3IONS-SCNC: 7 MMOL/L (ref 4–13)
AST SERPL W P-5'-P-CCNC: 11 U/L (ref 5–45)
BASOPHILS # BLD AUTO: 0.04 THOUSANDS/ΜL (ref 0–0.1)
BASOPHILS NFR BLD AUTO: 1 % (ref 0–1)
BILIRUB SERPL-MCNC: 0.43 MG/DL (ref 0.2–1)
BUN SERPL-MCNC: 13 MG/DL (ref 5–25)
CALCIUM SERPL-MCNC: 9 MG/DL
CHLORIDE SERPL-SCNC: 109 MMOL/L (ref 100–108)
CO2 SERPL-SCNC: 24 MMOL/L (ref 21–32)
CREAT SERPL-MCNC: 0.86 MG/DL (ref 0.6–1.3)
EOSINOPHIL # BLD AUTO: 0.68 THOUSAND/ΜL (ref 0–0.61)
EOSINOPHIL NFR BLD AUTO: 9 % (ref 0–6)
ERYTHROCYTE [DISTWIDTH] IN BLOOD BY AUTOMATED COUNT: 13.1 % (ref 11.6–15.1)
GFR SERPL CREATININE-BSD FRML MDRD: 81 ML/MIN/1.73SQ M
GLUCOSE SERPL-MCNC: 88 MG/DL (ref 65–140)
HCT VFR BLD AUTO: 42.5 % (ref 34.8–46.1)
HGB BLD-MCNC: 14.4 G/DL (ref 11.5–15.4)
LIPASE SERPL-CCNC: 72 U/L (ref 73–393)
LYMPHOCYTES # BLD AUTO: 1.87 THOUSANDS/ΜL (ref 0.6–4.47)
LYMPHOCYTES NFR BLD AUTO: 23 % (ref 14–44)
MCH RBC QN AUTO: 28.1 PG (ref 26.8–34.3)
MCHC RBC AUTO-ENTMCNC: 33.9 G/DL (ref 31.4–37.4)
MCV RBC AUTO: 83 FL (ref 82–98)
MONOCYTES # BLD AUTO: 0.6 THOUSAND/ΜL (ref 0.17–1.22)
MONOCYTES NFR BLD AUTO: 8 % (ref 4–12)
NEUTROPHILS # BLD AUTO: 4.81 THOUSANDS/ΜL (ref 1.85–7.62)
NEUTS SEG NFR BLD AUTO: 59 % (ref 43–75)
NRBC BLD AUTO-RTO: 0 /100 WBCS
PLATELET # BLD AUTO: 242 THOUSANDS/UL (ref 149–390)
PMV BLD AUTO: 8.7 FL (ref 8.9–12.7)
POTASSIUM SERPL-SCNC: 4 MMOL/L (ref 3.5–5.3)
PROT SERPL-MCNC: 7.9 G/DL (ref 6.4–8.2)
RBC # BLD AUTO: 5.13 MILLION/UL (ref 3.81–5.12)
SL AMB  POCT GLUCOSE, UA: NORMAL
SL AMB LEUKOCYTE ESTERASE,UA: NORMAL
SL AMB POCT BILIRUBIN,UA: NORMAL
SL AMB POCT BLOOD,UA: NORMAL
SL AMB POCT CLARITY,UA: CLEAR
SL AMB POCT COLOR,UA: YELLOW
SL AMB POCT KETONES,UA: NORMAL
SL AMB POCT NITRITE,UA: NORMAL
SL AMB POCT PH,UA: 6.5
SL AMB POCT SPECIFIC GRAVITY,UA: 1
SL AMB POCT URINE PROTEIN: NORMAL
SL AMB POCT UROBILINOGEN: 0.2
SODIUM SERPL-SCNC: 140 MMOL/L (ref 136–145)
WBC # BLD AUTO: 8.02 THOUSAND/UL (ref 4.31–10.16)

## 2018-04-11 PROCEDURE — 83690 ASSAY OF LIPASE: CPT | Performed by: EMERGENCY MEDICINE

## 2018-04-11 PROCEDURE — 74177 CT ABD & PELVIS W/CONTRAST: CPT

## 2018-04-11 PROCEDURE — 76705 ECHO EXAM OF ABDOMEN: CPT

## 2018-04-11 PROCEDURE — 85025 COMPLETE CBC W/AUTO DIFF WBC: CPT | Performed by: EMERGENCY MEDICINE

## 2018-04-11 PROCEDURE — 80053 COMPREHEN METABOLIC PANEL: CPT | Performed by: EMERGENCY MEDICINE

## 2018-04-11 PROCEDURE — 36415 COLL VENOUS BLD VENIPUNCTURE: CPT

## 2018-04-11 PROCEDURE — 99284 EMERGENCY DEPT VISIT MOD MDM: CPT

## 2018-04-11 PROCEDURE — 99213 OFFICE O/P EST LOW 20 MIN: CPT | Performed by: FAMILY MEDICINE

## 2018-04-11 RX ADMIN — IOHEXOL 100 ML: 350 INJECTION, SOLUTION INTRAVENOUS at 19:57

## 2018-04-11 NOTE — PATIENT INSTRUCTIONS
Proceed to emergency room for further evaluation  Patient reports that she will go to [address] [place] [address] 100  by [facility] notified

## 2018-04-11 NOTE — PROGRESS NOTES
[employer]        NAME: Lilliam Azul is a 52 y o  female  : 1970    MRN: [id number]  DATE: 2018  TIME: 2:10 PM    Assessment and Plan   Right lower quadrant abdominal pain [R10 31]  1  Right lower quadrant abdominal pain  Ambulatory Referral to Emergency Medicine   2  Acute right flank pain  Ambulatory Referral to Emergency Medicine   3  Solitary kidney, congenital  Ambulatory Referral to Emergency Medicine      urine dip in office without blood, leukocytes or nitrites    Patient Instructions     Patient Instructions   Proceed to emergency room for further evaluation  Patient reports that she will go to [address] [place] [address] notified  Chief Complaint     Chief Complaint   Patient presents with    Abdominal Pain    Back Pain    Vomiting    Nausea         History of Present Illness   Lilliam Azul presents to the clinic c/o    19-year-old female that presents with right lower quadrant and right flank pain that started during the weekend  Today she has had hot and cold spells and feels very tired  Today she also had 1 episode of vomiting  Ranks pain for over 10      history of 1 kidney   On right  Congenital lack of L  Kidney  history of kidney stones with hydronephrosis in 2018 requiring  What sounds like lithotripsy, cystoscopy and stent  She says that she feels bloated and has had a little bit more belching and normal   Denies any increased urinary frequency, dysuria, obvious blood in urine or urgency  1 softer than normal stool this morning  History of DREW/BSO in   Review of Systems   Review of Systems   Constitutional: Positive for chills, fatigue and fever  HENT: Negative  Respiratory: Negative  Cardiovascular: Negative  Gastrointestinal: Positive for abdominal distention, abdominal pain, diarrhea, nausea and vomiting  Negative for anal bleeding, blood in stool, constipation and rectal pain     Genitourinary: Negative  Skin: Negative  Neurological: Negative  Current Medications     Long-Term Prescriptions   Medication Sig Dispense Refill    cholecalciferol (VITAMIN D3) 1,000 units tablet Take by mouth      cyclobenzaprine (FLEXERIL) 10 mg tablet Take 1 tablet by mouth 3 (three) times a day as needed      estradiol (ESTRACE) 2 MG tablet Take 2 mg by mouth daily  3    ondansetron (ZOFRAN) 4 mg tablet Take 1 tablet (4 mg total) by mouth every 6 (six) hours for 7 days 12 tablet 0    topiramate (TOPAMAX) 25 mg tablet Take 25 mg by mouth 2 (two) times a day           Current Allergies     Allergies as of 04/11/2018 - Reviewed 04/11/2018   Allergen Reaction Noted    Nitrofurantoin Shortness Of Breath and Hallucinations 07/10/2016    Reglan [metoclopramide] Shortness Of Breath 07/10/2016    Benadryl [diphenhydramine] Palpitations 07/10/2016            The following portions of the patient's history were reviewed and updated as appropriate: allergies, current medications, past family history, past medical history, past social history, past surgical history and problem list     Objective   /83   Pulse 83   Temp 98 8 °F (37 1 °C)   Resp 18   Ht 4' 11" (1 499 m)   Wt 81 6 kg (180 lb)   SpO2 98%   BMI 36 36 kg/m²        Physical Exam     Physical Exam   Constitutional: She is oriented to person, place, and time  She appears well-developed and well-nourished  Appears mildly uncomfortable sitting on exam room table   HENT:   Head: Normocephalic  Mouth/Throat: Oropharynx is clear and moist  No oropharyngeal exudate  Eyes: Pupils are equal, round, and reactive to light  Right eye exhibits no discharge  Left eye exhibits no discharge  Mild conjunctival pallor without icterus   Neck: Normal range of motion  Neck supple  Cardiovascular: Normal rate, regular rhythm and normal heart sounds  Exam reveals no gallop and no friction rub  No murmur heard    Pulmonary/Chest: Effort normal and breath sounds normal  No respiratory distress  She has no wheezes  She has no rales  She exhibits no tenderness  Abdominal: Soft  She exhibits no distension and no mass  There is tenderness  There is no rebound and no guarding  Decreased bowel sounds throughout  Right CVAT  Right lower quadrant TTP without guarding or rigidity  No gross hepatosplenomegaly  No palpable masses  Lymphadenopathy:     She has no cervical adenopathy  Neurological: She is alert and oriented to person, place, and time  Skin: Skin is warm and dry  She is not diaphoretic  Psychiatric: She has a normal mood and affect  Nursing note and vitals reviewed

## 2018-04-11 NOTE — TELEPHONE ENCOUNTER
SPOKE WITH Pt  SHE IS HAVING PAIN ON THE RIGHT SIDE OF HER BACK, AND IS VERY NAUSEATED WITH VOMITING  SHE'S NOT SURE IF SHE HAS A FEVER OR NOT, BUT I ADVISED HER TO GO TO THE ER

## 2018-04-11 NOTE — TELEPHONE ENCOUNTER
Patient of Dr Deidra Sierra has had pain in her back and stomach on the right side since the weekend  She only has a right kidney  Patient is nauseous, and has vomited recently today  She's afraid it might be her right kidney  Please call her at [phone] (work), or if later today at home [phone]

## 2018-04-12 NOTE — DISCHARGE INSTRUCTIONS
Gallstones   WHAT YOU NEED TO KNOW:   Gallstones, also called cholelithiasis, are hard substances that form in your gallbladder or bile duct  Your gallbladder and bile duct are located on the right side of your abdomen, near your liver  Your gallbladder stores bile, which helps break down the fat that you eat  Your gallbladder also helps remove certain chemicals from your body  DISCHARGE INSTRUCTIONS:   Medicines:   · Prescription pain medicine  may be given  Ask your healthcare provider how to take this medicine safely  · Take your medicine as directed  Contact your healthcare provider if you think your medicine is not helping or if you have side effects  Tell him if you are allergic to any medicine  Keep a list of the medicines, vitamins, and herbs you take  Include the amounts, and when and why you take them  Bring the list or the pill bottles to follow-up visits  Carry your medicine list with you in case of an emergency  Follow up with your healthcare provider as directed:  Write down your questions so you remember to ask them during your visits  Eat a variety of healthy foods: This may help you have more energy and heal faster  Healthy foods include fruit, vegetables, whole-grain breads, low-fat dairy products, beans, lean meat, and fish  Ask if you need to be on a special diet  Exercise as directed:  Talk to your healthcare provider about the best exercise plan for you  Exercise can help you lose weight and improve your health  Contact your healthcare provider if:   · You have nausea and are vomiting  · Your urine is dark  · You have new-colored bowel movements  · You have questions or concerns about your condition or care  Return to the emergency department if:   · You have a fever and chills  · Your skin or eyes turn yellow  · You have severe pain in your upper abdomen, just below the right ribcage    © 2017 [address] [employer] Information is for End User's use only and may not be sold, redistributed or otherwise used for commercial purposes  All illustrations and images included in CareNotes® are the copyrighted property of [employer]  or [address]  The above information is an  only  It is not intended as medical advice for individual conditions or treatments  Talk to your doctor, nurse or pharmacist before following any medical regimen to see if it is safe and effective for you

## 2018-04-13 NOTE — ED PROVIDER NOTES
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 and recent nephrolithiasis who presents to the ED with R sided abdominal and flank pain  Patient initially presented to urgent care but was instructed to come to the ED over concern for recurrent nephrolithiasis  Patient states that her symptoms have persisted since the weekend but she recently started feeling nauseated and vomiting  Patient denies any dysuria or hematuria and she has not had any major abdominal surgeries  Patient has not seen any association with eating or noticed any exacerbating or alleviating factors  Prior to Admission Medications   Prescriptions Last Dose Informant Patient Reported?  Taking?   acetaminophen (TYLENOL) 325 mg tablet   No Yes   Sig: Take 2 tablets by mouth every 6 (six) hours as needed for mild pain or moderate pain   cholecalciferol (VITAMIN D3) 1,000 units tablet   Yes Yes   Sig: Take by mouth   cyclobenzaprine (FLEXERIL) 10 mg tablet   Yes Yes   Sig: Take 1 tablet by mouth 3 (three) times a day as needed   estradiol (ESTRACE) 2 MG tablet   Yes Yes   Sig: Take 2 mg by mouth daily   oxyCODONE (ROXICODONE) 5 mg immediate release tablet   Yes Yes   Sig: Take 5 mg by mouth every 4 (four) hours as needed for moderate pain   topiramate (TOPAMAX) 25 mg tablet   Yes Yes   Sig: Take 25 mg by mouth 2 (two) times a day        Facility-Administered Medications: None       Past Medical History:   Diagnosis Date    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    Kidney stone     Migraines        Past Surgical History:   Procedure Laterality Date    HYSTERECTOMY      KNEE SURGERY      WI CYSTO/URETERO W/LITHOTRIPSY &INDWELL STENT INSRT Right 2/16/2018    Procedure: CYSTOSCOPY RIGHT URETEROSCOPY WITH , RIGTH RETROGRADE PYELOGRAM AND IRIGHT URETERAL STENT EXCHANGE;  Surgeon: Remi Monaco MD;  Location: [facility];  Service: Urology    WI CYSTOURETHROSCOPY,URETER CATHETER Right 1/21/2018    Procedure: Alina Gomez; BILATERAL RETROGRADE PYELOGRAM WITH INSERTION STENT RIGHT URETERAL;  Surgeon: Ade Lucas MD;  Location:  MAIN OR;  Service: Urology       History reviewed  No pertinent family history  I have reviewed and agree with the history as documented  Social History   Substance Use Topics    Smoking status: Never Smoker    Smokeless tobacco: Never Used    Alcohol use No        Review of Systems   Constitutional: Negative for chills, fatigue and fever  HENT: Negative for congestion, rhinorrhea, sinus pressure and sore throat  Eyes: Negative for visual disturbance  Respiratory: Negative for cough and shortness of breath  Cardiovascular: Negative for chest pain  Gastrointestinal: Positive for abdominal pain  Negative for constipation, diarrhea, nausea and vomiting  Genitourinary: Positive for flank pain  Negative for dysuria, frequency, hematuria and urgency  Musculoskeletal: Negative for arthralgias and myalgias  Skin: Negative for color change and rash  Neurological: Negative for dizziness, light-headedness and numbness         Physical Exam  ED Triage Vitals [04/11/18 1517]   Temperature Pulse Respirations Blood Pressure SpO2   97 7 °F (36 5 °C) 97 18 (!) 149/103 98 %      Temp Source Heart Rate Source Patient Position - Orthostatic VS BP Location FiO2 (%)   Tympanic Monitor Lying Left arm --      Pain Score       3           Orthostatic Vital Signs  Vitals:    04/11/18 1817 04/11/18 1926 04/11/18 2030 04/11/18 2206   BP: 120/71 116/66 112/65 123/60   Pulse: 70 81 76 78   Patient Position - Orthostatic VS: Lying Lying  Lying       Physical Exam   Constitutional: She is oriented to person, place, and time  She appears well-developed and well-nourished  No distress  HENT:   Head: Normocephalic and atraumatic  Eyes: Conjunctivae are normal  Pupils are equal, round, and reactive to light  Right eye exhibits no discharge  Left eye exhibits no discharge  Cardiovascular: Normal rate, regular rhythm and normal heart sounds  Exam reveals no gallop and no friction rub  No murmur heard  Pulmonary/Chest: Effort normal and breath sounds normal  No respiratory distress  She has no wheezes  She has no rales  Abdominal: Soft  Bowel sounds are normal  She exhibits no distension  There is tenderness (R-sided)  There is no guarding  Musculoskeletal: Normal range of motion  She exhibits no edema or deformity  Neurological: She is alert and oriented to person, place, and time  Skin: Skin is warm and dry  Capillary refill takes less than 2 seconds  She is not diaphoretic  Psychiatric: She has a normal mood and affect  Her behavior is normal    Nursing note and vitals reviewed        ED Medications  Medications   iohexol (OMNIPAQUE) 350 MG/ML injection (MULTI-DOSE) 100 mL (100 mL Intravenous Given 4/11/18 1957)       Diagnostic Studies  Results Reviewed     Procedure Component Value Units Date/Time    Comprehensive metabolic panel [[id number]]  (Abnormal) Collected:  04/11/18 1523    Lab Status:  Final result Specimen:  Blood from Arm, Left Updated:  04/11/18 1547     Sodium 140 mmol/L      Potassium 4 0 mmol/L      Chloride 109 (H) mmol/L      CO2 24 mmol/L      Anion Gap 7 mmol/L      BUN 13 mg/dL      Creatinine 0 86 mg/dL      Glucose 88 mg/dL      Calcium 9 0 mg/dL      AST 11 U/L      ALT 22 U/L      Alkaline Phosphatase 68 U/L      Total Protein 7 9 g/dL      Albumin 3 8 g/dL      Total Bilirubin 0 43 mg/dL      eGFR 81 ml/min/1 73sq m     Narrative:         National Kidney Disease Education Program recommendations are as follows:  GFR calculation is accurate only with a steady state creatinine  Chronic Kidney disease less than 60 ml/min/1 73 sq  meters  Kidney failure less than 15 ml/min/1 73 sq  meters  Lipase [[id number]]  (Abnormal) Collected:  04/11/18 1523    Lab Status:  Final result Specimen:  Blood from Arm, Left Updated:  04/11/18 1547     Lipase 72 (L) u/L     CBC and differential [[id number]]  (Abnormal) Collected:  04/11/18 1523    Lab Status:  Final result Specimen:  Blood from Arm, Left Updated:  04/11/18 1529     WBC 8 02 Thousand/uL      RBC 5 13 (H) Million/uL      Hemoglobin 14 4 g/dL      Hematocrit 42 5 %      MCV 83 fL      MCH 28 1 pg      MCHC 33 9 g/dL      RDW 13 1 %      MPV 8 7 (L) fL      Platelets 935 Thousands/uL      nRBC 0 /100 WBCs      Neutrophils Relative 59 %      Lymphocytes Relative 23 %      Monocytes Relative 8 %      Eosinophils Relative 9 (H) %      Basophils Relative 1 %      Neutrophils Absolute 4 81 Thousands/µL      Lymphocytes Absolute 1 87 Thousands/µL      Monocytes Absolute 0 60 Thousand/µL      Eosinophils Absolute 0 68 (H) Thousand/µL      Basophils Absolute 0 04 Thousands/µL                  US right upper quadrant   Final Result by Toni Morales MD (04/11 2141)      Cholelithiasis  No biliary dilatation, gallbladder wall thickening, pericholecystic fluid, or sonographic Reyna's sign  Mild hepatic steatosis  Mild right hydronephrosis with fullness of upper pole right renal collecting system, unchanged from most recent CT, but diminished in caliber when compared to January 20, 2018  Workstation performed: [id number]         CT abdomen pelvis with contrast   Final Result by Caitlin Dean MD (04/11 2003)      Mild right hydroureteronephrosis without an obstructive right ureteral calculus could indicate a recently passed calculus or a nonradiopaque obstructive lesion such as thrombus, infectious debris, stricture or tumor    Follow-up with delayed postcontrast    images through the kidney, ureter and bladder if the patient has persistent pain  The study was marked in [facility] for immediate notification  Severe congenital atrophy of left kidney  Workstation performed: [id number]               Procedures  Procedures      Phone Consults  ED Phone Contact    ED Course  ED Course                                MDM  Number of Diagnoses or Management Options  Cholelithiasis:   Hepatic steatosis:   Diagnosis management comments: Patient's CTAP showed no signs of current nephrolithiasis but showed signs of recent nephrolithiasis and recent stone passage, which fits with her history back in January  A bedside ultrasound showed possible gall stones, which were confirmed with a formal ultrasound  No signs of cholecystitis  Will discharge patient to home with instructions to follow up with general surgery as an outpatient or return to the ED should she develop any worsening or concerning symptoms  CritCare Time    Disposition  Final diagnoses:   Cholelithiasis   Hepatic steatosis     Time reflects when diagnosis was documented in both MDM as applicable and the Disposition within this note     Time User Action Codes Description Comment    4/11/2018 10:05 PM Asher Gudino Add [K80 20] Cholelithiasis     4/11/2018 10:07 PM Asher Gudino Add [K76 0] Hepatic steatosis       ED Disposition     ED Disposition Condition Comment    Discharge  Anabella Forrest discharge to home/self care      Condition at discharge: Good        Follow-up Information     Follow up With Specialties Details Why Alondra Green MD [facility]   [address]  [address]  [facility] [address]  [facility] [address] [address]  [phone]          Discharge Medication List as of 4/11/2018 10:09 PM      CONTINUE these medications which have NOT CHANGED    Details   acetaminophen (TYLENOL) 325 mg tablet Take 2 tablets by mouth every 6 (six) hours as needed for mild pain or moderate pain, Starting Tue 1/23/2018, No Print      cholecalciferol (VITAMIN D3) 1,000 units tablet Take by mouth, Historical Med      cyclobenzaprine (FLEXERIL) 10 mg tablet Take 1 tablet by mouth 3 (three) times a day as needed, Starting Sun 7/10/2016, Historical Med      estradiol (ESTRACE) 2 MG tablet Take 2 mg by mouth daily, Starting Wed 12/13/2017, Historical Med      oxyCODONE (ROXICODONE) 5 mg immediate release tablet Take 5 mg by mouth every 4 (four) hours as needed for moderate pain, Historical Med      topiramate (TOPAMAX) 25 mg tablet Take 25 mg by mouth 2 (two) times a day  , Starting Tue 1/23/2018, Historical Med           No discharge procedures on file  ED Provider  Attending physically available and evaluated Shana Vanegas  LISA managed the patient along with the ED Attending      Electronically Signed by         Vladimir Tran MD  04/14/18 2535

## 2018-04-13 NOTE — ED ATTENDING ATTESTATION
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, here with right-sided back and abdominal pain  Patient states that the pain has been slow in onset and gotten worse  She has a history of kidney stones in the past, but states this does not feel like kidney stones  She was concerned about her kidney which is why she presents  The patient denies dysuria, fevers, or chills  She did have some vomiting and some bowel changes  The patient has not noted blood in her urine  She has not had urinary frequency or urgency  Her review of systems otherwise negative in 12 systems were reviewed  On exam Vital signs were reviewed  Patient is awake, alert, interactive  The patient's pupils are equally round reactive to light  Oropharynx is clear with moist mucous membranes  Neck is supple and nontender with no adenopathy or JVD  Heart is regular with no murmurs, rubs, or gallops  Lungs are clear and equal with no wheezes, rales, or rhonchi  Abdomen is soft and nontender with no masses, rebound, or guarding  There is no CVA tenderness  The patient was completely exposed  There is no skin breakdown  There are no rashes or skin changes  Extremities are warm and well perfused with good pulses  The patient has normal strength, sensation, and cranial nerves  Impression:  Abdominal pain  Will plan to CT to rule out obstructive kidney stone    Patient has not been having difficulty making urine, and her urine is negative here  Will plan to ultrasound for gallbladder pathology as well    Critical Care Time  CritCare Time    Procedures

## 2018-04-15 ENCOUNTER — APPOINTMENT (EMERGENCY)
Dept: RADIOLOGY | Facility: HOSPITAL | Age: 48
End: 2018-04-15
Payer: COMMERCIAL

## 2018-04-15 ENCOUNTER — HOSPITAL ENCOUNTER (EMERGENCY)
Facility: HOSPITAL | Age: 48
Discharge: HOME | End: 2018-04-15
Attending: EMERGENCY MEDICINE
Payer: COMMERCIAL

## 2018-04-15 VITALS
TEMPERATURE: 97.8 F | DIASTOLIC BLOOD PRESSURE: 68 MMHG | BODY MASS INDEX: 36.37 KG/M2 | WEIGHT: 180.4 LBS | SYSTOLIC BLOOD PRESSURE: 127 MMHG | HEART RATE: 99 BPM | HEIGHT: 59 IN | RESPIRATION RATE: 18 BRPM | OXYGEN SATURATION: 99 %

## 2018-04-15 DIAGNOSIS — M54.9 BACK PAIN: ICD-10-CM

## 2018-04-15 DIAGNOSIS — M54.2 NECK PAIN: ICD-10-CM
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
## 2018-04-15 PROCEDURE — 72100 X-RAY EXAM L-S SPINE 2/3 VWS: CPT

## 2018-04-15 PROCEDURE — 72040 X-RAY EXAM NECK SPINE 2-3 VW: CPT

## 2018-04-15 PROCEDURE — 99283 EMERGENCY DEPT VISIT LOW MDM: CPT

## 2018-04-15 RX ORDER — CYCLOBENZAPRINE HCL 10 MG
10 TABLET ORAL 3 TIMES DAILY PRN
Qty: 20 TABLET | Refills: 0 | Status: ON HOLD | OUTPATIENT
Start: 2018-04-15 | End: 2018-09-12 | Stop reason: ALTCHOICE

## 2018-04-15 RX ORDER — OXYCODONE HYDROCHLORIDE 5 MG/1
5 TABLET ORAL EVERY 4 HOURS PRN
Qty: 30 TABLET | Refills: 0 | Status: SHIPPED | OUTPATIENT
Start: 2018-04-15 | End: 2018-04-25

## 2018-04-15 RX ORDER — OXYCODONE HYDROCHLORIDE 5 MG/1
5 TABLET ORAL ONCE
Status: COMPLETED | OUTPATIENT
Start: 2018-04-15 | End: 2018-04-15

## 2018-04-15 RX ADMIN — OXYCODONE HYDROCHLORIDE 5 MG: 5 TABLET ORAL at 19:58
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
## 2018-04-16 NOTE — DISCHARGE INSTRUCTIONS
Acute Low Back Pain   WHAT YOU NEED TO KNOW:   Acute low back pain is sudden discomfort in your lower back area that lasts for up to 6 weeks  The discomfort makes it difficult to tolerate activity  DISCHARGE INSTRUCTIONS:   Return to the emergency department if:   · You have severe pain  · You have sudden stiffness and heaviness on both buttocks down to both legs  · You have numbness or weakness in one leg, or pain in both legs  · You have numbness in your genital area or across your lower back  · You cannot control your urine or bowel movements  Contact your healthcare provider if:   · You have a fever  · You have pain at night or when you rest     · Your pain does not get better with treatment  · You have pain that worsens when you cough or sneeze  · You suddenly feel something pop or snap in your back  · You have questions or concerns about your condition or care  Medicines: The following medicines may be ordered by your healthcare provider:  · Acetaminophen  decreases pain  It is available without a doctor's order  Ask how much to take and how often to take it  Follow directions  Acetaminophen can cause liver damage if not taken correctly  · NSAIDs  help decrease swelling and pain  This medicine is available with or without a doctor's order  NSAIDs can cause stomach bleeding or kidney problems in certain people  If you take blood thinner medicine, always ask your healthcare provider if NSAIDs are safe for you  Always read the medicine label and follow directions  · Prescription pain medicine  may be given  Ask your healthcare provider how to take this medicine safely  · Muscle relaxers  decrease pain by relaxing the muscles in your lower spine  · Take your medicine as directed  Contact your healthcare provider if you think your medicine is not helping or if you have side effects  Tell him of her if you are allergic to any medicine   Keep a list of the medicines, vitamins, and herbs you take  Include the amounts, and when and why you take them  Bring the list or the pill bottles to follow-up visits  Carry your medicine list with you in case of an emergency  Self-care:   · Stay active  as much as you can without causing more pain  Bed rest could make your back pain worse  Start with some light exercises such as walking  Avoid heavy lifting until your pain is gone  Ask for more information about the activities or exercises that are right for you  · Ice  helps decrease swelling, pain, and muscle spams  Put crushed ice in a plastic bag  Cover it with a towel  Place it on your lower back for 20 to 30 minutes every 2 hours  Do this for about 2 to 3 days after your pain starts, or as directed  · Heat  helps decrease pain and muscle spasms  Start to use heat after treatment with ice has stopped  Use a small towel dampened with warm water or a heating pad, or sit in a warm bath  Apply heat on the area for 20 to 30 minutes every 2 hours for as many days as directed  Alternate heat and ice  Prevent acute low back pain:   · Use proper body mechanics  ¨ Bend at the hips and knees when you  objects  Do not bend from the waist  Use your leg muscles as you lift the load  Do not use your back  Keep the object close to your chest as you lift it  Try not to twist or lift anything above your waist     ¨ Change your position often when you stand for long periods of time  Rest one foot on a small box or footrest, and then switch to the other foot often  ¨ Try not to sit for long periods of time  When you do, sit in a straight-backed chair with your feet flat on the floor  Never reach, pull, or push while you are sitting  · Do exercises that strengthen your back muscles  Warm up before you exercise  Ask your healthcare provider the best exercises for you  · Maintain a healthy weight  Ask your healthcare provider how much you should weigh   Ask him to help you create a weight loss plan if you are overweight  Follow up with your healthcare provider as directed:  Return for a follow-up visit if you still have pain after 1 to 3 weeks of treatment  You may need to visit an orthopedist if your back pain lasts more than 12 weeks  Write down your questions so you remember to ask them during your visits  © 2017 [employer] Information is for End User's use only and may not be sold, redistributed or otherwise used for commercial purposes  All illustrations and images included in CareNotes® are the copyrighted property of [employer] M [employer] Inc  or [employer]  The above information is an  only  It is not intended as medical advice for individual conditions or treatments  Talk to your doctor, nurse or pharmacist before following any medical regimen to see if it is safe and effective for you  Acute Neck Pain   WHAT YOU NEED TO KNOW:   Acute neck pain starts suddenly, increases quickly, and goes away in a few days  The pain may come and go, or be worse with certain movements  The pain may be only in your neck, or it may move to your arms, back, or shoulders  You may also have pain that starts in another body area and moves to your neck  DISCHARGE INSTRUCTIONS:   Return to the emergency department if:   · You have an injury that causes neck pain and shooting pain down your arms or legs  · Your neck pain suddenly becomes severe  · You have neck pain along with numbness, tingling, or weakness in your arms or legs  · You have a stiff neck, a headache, and a fever  Contact your healthcare provider if:   · You have new or worsening symptoms  · Your symptoms continue even after treatment  · You have questions or concerns about your condition or care  Medicines:   · NSAIDs , such as ibuprofen, help decrease swelling, pain, and fever  This medicine is available without a doctor's order   Ask your healthcare provider which medicine to take and how often to take it  Follow directions  NSAIDs can cause stomach bleeding or kidney problems if not taken correctly  If you take blood thinner medicine, always ask if NSAIDs are safe for you  · Acetaminophen  helps decrease pain and fever  Ask your healthcare provider how much to take and how often to take it  Follow directions  Acetaminophen can cause liver damage if not taken correctly  · Steroid medicine  may be used to reduce inflammation  This can help relieve pain caused by swelling  · Take your medicine as directed  Contact your healthcare provider if you think your medicine is not helping or if you have side effects  Tell him or her if you are allergic to any medicine  Keep a list of the medicines, vitamins, and herbs you take  Include the amounts, and when and why you take them  Bring the list or the pill bottles to follow-up visits  Carry your medicine list with you in case of an emergency  Manage or prevent acute neck pain:   · Rest your neck as directed  Do not make sudden movements, such as turning your head quickly  Your healthcare provider may recommend you wear a cervical collar for a short time  The collar will prevent you from moving your head  This will give your neck time to heal if an injury is causing your neck pain  Ask your healthcare provider when you can return to sports or other normal daily activities  · Apply heat as directed  Heat helps relieve pain and swelling  Use a heat wrap, or soak a small towel in warm water  Wring out the extra water  Apply the heat wrap or towel for 20 minutes every hour, or as directed  · Apply ice as directed  Ice helps relieve pain and swelling, and can help prevent tissue damage  Use an ice pack, or put ice in a bag  Cover the ice pack or back with a towel before you apply it to your neck  Apply the ice pack or ice for 15 minutes every hour, or as directed  Your healthcare provider can tell you how often to apply ice      · Do neck exercises as directed  Neck exercises help strengthen the muscles and increase range of motion  Your healthcare provider will tell you which exercises are right for you  He may give you instructions, or he may recommend that you work with a physical therapist  Your healthcare provider or therapist can make sure you are doing the exercises correctly  · Maintain good posture  Try to keep your head and shoulders lifted when you sit  If you work in front of a computer, make sure the monitor is at the right level  You should not need to look up down to see the screen  You should also not have to lean forward to be able to read what is on the screen  Make sure your keyboard, mouse, and other computer items are placed where you do not have to extend your shoulder to reach them  Get up often if you work in front of a computer or sit for long periods of time  Stretch or walk around to keep your neck muscles loose  Follow up with your healthcare provider as directed: Your healthcare provider may refer you to a specialist if your pain does not get better with treatment  Write down your questions so you remember to ask them during your visits  © 2017 [employer] Information is for End User's use only and may not be sold, redistributed or otherwise used for commercial purposes  All illustrations and images included in CareNotes® are the copyrighted property of A [employer] M , Inc  or [employer]  The above information is an  only  It is not intended as medical advice for individual conditions or treatments  Talk to your doctor, nurse or pharmacist before following any medical regimen to see if it is safe and effective for you

## 2018-04-17 ENCOUNTER — OFFICE VISIT (OUTPATIENT)
Dept: SURGERY | Facility: CLINIC | Age: 48
End: 2018-04-17
Payer: COMMERCIAL

## 2018-04-17 ENCOUNTER — TELEPHONE (OUTPATIENT)
Dept: UROLOGY | Facility: MEDICAL CENTER | Age: 48
End: 2018-04-17

## 2018-04-17 VITALS
HEIGHT: 59 IN | SYSTOLIC BLOOD PRESSURE: 118 MMHG | WEIGHT: 180.4 LBS | DIASTOLIC BLOOD PRESSURE: 78 MMHG | BODY MASS INDEX: 36.37 KG/M2 | TEMPERATURE: 98.3 F

## 2018-04-17 DIAGNOSIS — K80.20 CALCULUS OF GALLBLADDER WITHOUT CHOLECYSTITIS WITHOUT OBSTRUCTION: Primary | ICD-10-CM

## 2018-04-17 PROCEDURE — 99203 OFFICE O/P NEW LOW 30 MIN: CPT | Performed by: SURGERY

## 2018-04-17 NOTE — ASSESSMENT & PLAN NOTE
I told her every time she talks about pain she points to her back where she had kidney stones    The CT scan said there is questionable

## 2018-04-17 NOTE — PROGRESS NOTES
Office Visit - General Surgery  Sandra Carr MRN: [id number]  Encounter: [id number]    Assessment and Plan    Problem List Items Addressed This Visit        Digestive    Calculus of gallbladder without cholecystitis without obstruction - Primary     I told her every time she talks about pain she points to her back where she had kidney stones  The CT scan said there is questionable fullness in the collecting system consistent with the passage of a stone  She has no other symptoms referable to gallbladder symptoms  I told I think a lot of this may be related to her kidney stone  I would not offer her surgery at this point time  Call if she has any more classic symptoms of gallbladder disease  Chief Complaint:  Sandra Carr is a 52 y o  female who presents for Cholelithiasis (Consult gallbladder  Testing done and in chart)    Subjective  63-year-old female presents for possible gallbladder problems  She has history of right-sided kidney stones which she tells me began is pain in her right back radiate around towards the right groin  Now she complains of having pain again  She always points to her right back when she talks about a however she does say the goes around the frontal little bit more  Had some nausea and vomited once she says the pain she gets with her kidney stone is worse  She finds and nothing makes it better nothing makes it worse  No problems eating anything  Never had anything like this before  She was in the emergency room had ultrasound and CT scan which documented gallstones and some hydronephrosis on the right kidney      Past Medical History  Past Medical History:   Diagnosis Date    Cholelithiasis     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    Kidney stone     Migraines        Past Surgical History  Past Surgical History:   Procedure Laterality Date    HYSTERECTOMY      KNEE SURGERY      DC CYSTO/URETERO W/LITHOTRIPSY &INDWELL STENT INSRT Right 2/16/2018    Procedure: CYSTOSCOPY RIGHT URETEROSCOPY WITH , RIGTH RETROGRADE PYELOGRAM AND IRIGHT URETERAL STENT EXCHANGE;  Surgeon: Dveon Fonseca MD;  Location: [facility];  Service: Urology    AL CYSTOURETHROSCOPY,URETER CATHETER Right 1/21/2018    Procedure: CYSTOSCOPY; BILATERAL RETROGRADE PYELOGRAM WITH INSERTION STENT RIGHT URETERAL;  Surgeon: Tamera Padilla MD;  Location:  [facility];  Service: Urology       Family History  Family History   Problem Relation Age of Onset    Alzheimer's disease Mother     Schizophrenia Father     Bipolar disorder Father     Hypertension Father        Medications  Current Outpatient Prescriptions on File Prior to Visit   Medication Sig Dispense Refill    cyclobenzaprine (FLEXERIL) 10 mg tablet Take 1 tablet (10 mg total) by mouth 3 (three) times a day as needed for muscle spasms 20 tablet 0    estradiol (ESTRACE) 2 MG tablet Take 2 mg by mouth daily  3    oxyCODONE (ROXICODONE) 5 mg immediate release tablet Take 1 tablet (5 mg total) by mouth every 4 (four) hours as needed for moderate pain for up to 10 days Max Daily Amount: 30 mg 30 tablet 0    topiramate (TOPAMAX) 25 mg tablet Take 25 mg by mouth 2 (two) times a day         No current facility-administered medications on file prior to visit  Allergies  Allergies   Allergen Reactions    Nitrofurantoin Shortness Of Breath and Hallucinations    Reglan [Metoclopramide] Shortness Of Breath    Benadryl [Diphenhydramine] Palpitations       Review of Systems   Constitutional: Negative for chills and fever  HENT: Negative for trouble swallowing and voice change  Eyes: Negative for visual disturbance  Respiratory: Negative for cough and shortness of breath  Cardiovascular: Negative for chest pain and leg swelling  Gastrointestinal: Positive for abdominal pain, nausea and vomiting  Endocrine: Negative for cold intolerance, heat intolerance, polydipsia, polyphagia and polyuria     Genitourinary: Negative for difficulty urinating  Musculoskeletal: Negative for arthralgias and gait problem  Skin: Negative for wound  Allergic/Immunologic: Negative for food allergies  Neurological: Negative for seizures, syncope, weakness and headaches  Hematological: Negative for adenopathy  Psychiatric/Behavioral: Negative for confusion  All other systems reviewed and are negative  Objective  Vitals:    04/17/18 0831   BP: 118/78   Temp: 98 3 °F (36 8 °C)       Physical Exam   Constitutional: She is oriented to person, place, and time  She appears well-developed and well-nourished  HENT:   Head: Normocephalic and atraumatic  Right Ear: External ear normal    Left Ear: External ear normal    Eyes: Conjunctivae are normal    Neck: Neck supple  Cardiovascular: Normal rate, regular rhythm and normal heart sounds  Pulmonary/Chest: Effort normal and breath sounds normal    Abdominal: Soft  Bowel sounds are normal  She exhibits no mass  There is no rebound and no guarding  Musculoskeletal: Normal range of motion  Lymphadenopathy:     She has no cervical adenopathy  Neurological: She is alert and oriented to person, place, and time  Skin: Skin is warm and dry  Psychiatric: She has a normal mood and affect  Her behavior is normal  Judgment and thought content normal    Nursing note and vitals reviewed

## 2018-05-31 ENCOUNTER — OFFICE VISIT (OUTPATIENT)
Dept: SURGERY | Facility: CLINIC | Age: 48
End: 2018-05-31
Payer: COMMERCIAL

## 2018-05-31 VITALS
BODY MASS INDEX: 36.25 KG/M2 | TEMPERATURE: 98 F | HEIGHT: 59 IN | SYSTOLIC BLOOD PRESSURE: 130 MMHG | DIASTOLIC BLOOD PRESSURE: 90 MMHG | WEIGHT: 179.8 LBS

## 2018-05-31 DIAGNOSIS — K29.70 GASTRITIS WITHOUT BLEEDING, UNSPECIFIED CHRONICITY, UNSPECIFIED GASTRITIS TYPE: Primary | ICD-10-CM

## 2018-05-31 DIAGNOSIS — K80.20 CALCULUS OF GALLBLADDER WITHOUT CHOLECYSTITIS WITHOUT OBSTRUCTION: ICD-10-CM

## 2018-05-31 PROCEDURE — 99214 OFFICE O/P EST MOD 30 MIN: CPT | Performed by: SURGERY

## 2018-05-31 RX ORDER — OMEPRAZOLE 40 MG/1
40 CAPSULE, DELAYED RELEASE ORAL DAILY
Qty: 30 CAPSULE | Refills: 3 | Status: SHIPPED | OUTPATIENT
Start: 2018-05-31 | End: 2018-09-20 | Stop reason: SDUPTHER

## 2018-05-31 NOTE — ASSESSMENT & PLAN NOTE
Symptoms still do not appear to correlate well with symptomatic cholelithiasis  However, I did discuss that sometimes sx may be atypical  I recommended follow-up with us PRN if her sx do not improve despite treatment for gastritis

## 2018-05-31 NOTE — ASSESSMENT & PLAN NOTE
Pt with 2 week history of epigastric pain and belching, nausea with and without any PO intake  She has had recent stressors including a family  today  Sx appear to be more 2/2 gastritis or PUD  Will start her on prilosec  GI Referral provided to evaluate for EGD and she will follow-up with her PCP soon as well

## 2018-05-31 NOTE — PROGRESS NOTES
Office Visit - General Surgery  Davin Reece MRN: [id number]  Encounter: [id number]    Assessment and Plan    Problem List Items Addressed This Visit     Calculus of gallbladder without cholecystitis without obstruction     Symptoms still do not appear to correlate well with symptomatic cholelithiasis  However, I did discuss that sometimes sx may be atypical  I recommended follow-up with us PRN if her sx do not improve despite treatment for gastritis  Gastritis without bleeding - Primary     Pt with 2 week history of epigastric pain and belching, nausea with and without any PO intake  She has had recent stressors including a family  today  Sx appear to be more 2/2 gastritis or PUD  Will start her on prilosec  GI Referral provided to evaluate for EGD and she will follow-up with her PCP soon as well  Relevant Medications    omeprazole (PriLOSEC) 40 MG capsule    Other Relevant Orders    Ambulatory referral to Gastroenterology          Chief Complaint:  Davin Reece is a 52 y o  female who presents for Advice Only (Donna Corrales)    Subjective  Davin Reece is a 52F with hx R nephrolithiasis with hydronephrosis who was evaluated in our office last month for cholelithiasis seen on ED work-up for R flank pain  Symptoms were not felt to be 2/2 cholelithiasis or cholecystitis at that time  However, she presents today for re-evaluation  In the past 2 weeks, she has been having abdominal pain with nausea  Pain is located in her subxiphoid / epigastric region and is associated with any PO intake  She does not have pain in her RUQ and states the epigastric pain is different from her usual R back pain  She denies eating fatty/fried foods - eats salad 5x/week and mainly rice/beans/vegetables for dinner  She also reports reflux symptoms with increased belching since these symptoms began   She denies significant stressors but then stated her family member  and had just come from his   No ETOH or NSAID use  Denies fevers, chills, constipation, diarrhea  She has never had an EGD or colonoscopy  She is also concerned about a recent color change to her BLE - she wakes up in the morning and has mottling of her legs (below her knees)  Denies any numbness/tingling  Past Medical History  Past Medical History:   Diagnosis Date    Cholelithiasis     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    Kidney stone     Migraines        Past Surgical History  Past Surgical History:   Procedure Laterality Date    HYSTERECTOMY      KNEE SURGERY      DC CYSTO/URETERO W/LITHOTRIPSY &INDWELL STENT INSRT Right 2/16/2018    Procedure: CYSTOSCOPY RIGHT URETEROSCOPY WITH , RIGTH RETROGRADE PYELOGRAM AND IRIGHT URETERAL STENT EXCHANGE;  Surgeon: Aaron Schaefer MD;  Location: [facility];  Service: Urology    DC CYSTOURETHROSCOPY,URETER CATHETER Right 1/21/2018    Procedure: CYSTOSCOPY; BILATERAL RETROGRADE PYELOGRAM WITH INSERTION STENT RIGHT URETERAL;  Surgeon: Victor M Morejon MD;  Location: [facility];  Service: Urology       Family History  Family History   Problem Relation Age of Onset    Alzheimer's disease Mother     Schizophrenia Father     Bipolar disorder Father     Hypertension Father      No family history of colon cancer or other cancers  Medications  Current Outpatient Prescriptions on File Prior to Visit   Medication Sig Dispense Refill    estradiol (ESTRACE) 2 MG tablet Take 2 mg by mouth daily  3    topiramate (TOPAMAX) 25 mg tablet Take 25 mg by mouth 2 (two) times a day        cyclobenzaprine (FLEXERIL) 10 mg tablet Take 1 tablet (10 mg total) by mouth 3 (three) times a day as needed for muscle spasms 20 tablet 0     No current facility-administered medications on file prior to visit          Allergies  Allergies   Allergen Reactions    Nitrofurantoin Shortness Of Breath and Hallucinations    Reglan [Metoclopramide] Shortness Of Breath    Benadryl [Diphenhydramine] Palpitations Review of Systems   Constitutional: Positive for appetite change and chills  Negative for activity change, fever and unexpected weight change  HENT: Negative  Negative for congestion, sneezing and sore throat  Eyes: Negative  Negative for pain and redness  Respiratory: Negative  Negative for chest tightness, shortness of breath and wheezing  Cardiovascular: Negative  Negative for chest pain and palpitations  Gastrointestinal: Positive for abdominal pain, nausea and vomiting  Negative for abdominal distention, constipation and diarrhea  Endocrine: Negative  Negative for cold intolerance and heat intolerance  Genitourinary: Negative  Negative for dysuria, hematuria and urgency  Musculoskeletal: Positive for back pain  Negative for arthralgias  Skin: Positive for color change  Negative for rash  Allergic/Immunologic: Negative  Negative for environmental allergies and food allergies  Neurological: Negative  Negative for dizziness and light-headedness  Hematological: Negative  Negative for adenopathy  Does not bruise/bleed easily  Psychiatric/Behavioral: Negative  Negative for agitation and sleep disturbance  The patient is not nervous/anxious  Objective  Vitals:    05/31/18 1735   BP: 130/90   Temp: 98 °F (36 7 °C)       Physical Exam   Constitutional: She is oriented to person, place, and time  She appears well-developed and well-nourished  HENT:   Head: Normocephalic and atraumatic  Eyes: EOM are normal  Pupils are equal, round, and reactive to light  Abdominal: Soft  She exhibits no distension and no mass  There is tenderness  There is no rebound and no guarding  Minimal epigastric TTP  Non-tender in RUQ or in the remainder of her abdomen   Musculoskeletal: Normal range of motion  She exhibits no edema or tenderness  Palpable DP/PT bilaterally  No edema appreciated   Neurological: She is alert and oriented to person, place, and time     Skin: Skin is warm and dry  Trace mottling of her BLE just distal to her knees  Psychiatric: She has a normal mood and affect   Her behavior is normal  Thought content normal

## 2018-07-09 ENCOUNTER — APPOINTMENT (EMERGENCY)
Dept: CT IMAGING | Facility: HOSPITAL | Age: 48
End: 2018-07-09
Payer: COMMERCIAL

## 2018-07-09 ENCOUNTER — HOSPITAL ENCOUNTER (EMERGENCY)
Facility: HOSPITAL | Age: 48
Discharge: HOME | End: 2018-07-09
Attending: EMERGENCY MEDICINE
Payer: COMMERCIAL

## 2018-07-09 VITALS
HEART RATE: 94 BPM | TEMPERATURE: 98.6 F | WEIGHT: 175 LBS | BODY MASS INDEX: 35.28 KG/M2 | OXYGEN SATURATION: 97 % | HEIGHT: 59 IN | SYSTOLIC BLOOD PRESSURE: 133 MMHG | DIASTOLIC BLOOD PRESSURE: 77 MMHG | RESPIRATION RATE: 18 BRPM

## 2018-07-09 DIAGNOSIS — S16.1XXA STRAIN OF CERVICAL PORTION OF RIGHT TRAPEZIUS MUSCLE: Primary | ICD-10-CM

## 2018-07-09 DIAGNOSIS — S13.4XXA: ICD-10-CM

## 2018-07-09 PROCEDURE — 99284 EMERGENCY DEPT VISIT MOD MDM: CPT

## 2018-07-09 PROCEDURE — 70490 CT SOFT TISSUE NECK W/O DYE: CPT

## 2018-07-09 RX ORDER — CYCLOBENZAPRINE HCL 10 MG
10 TABLET ORAL 3 TIMES DAILY PRN
Qty: 30 TABLET | Refills: 0 | Status: ON HOLD | OUTPATIENT
Start: 2018-07-09 | End: 2018-09-12 | Stop reason: ALTCHOICE

## 2018-07-09 RX ORDER — OXYCODONE HYDROCHLORIDE 5 MG/1
5 TABLET ORAL EVERY 4 HOURS PRN
Qty: 12 TABLET | Refills: 0 | Status: SHIPPED | OUTPATIENT
Start: 2018-07-09 | End: 2018-07-19

## 2018-07-09 NOTE — DISCHARGE INSTRUCTIONS
Acute Neck Pain   WHAT YOU NEED TO KNOW:   What do I need to know about acute neck pain? Acute neck pain starts suddenly, increases quickly, and goes away in a few days  The pain may come and go, or be worse with certain movements  The pain may be only in your neck, or it may move to your arms, back, or shoulders  You may also have pain that starts in another body area and moves to your neck  What causes or increases my risk for acute neck pain? Acute neck pain is often caused by a muscle strain or ligament sprain  Any of the following may cause acute neck pain:  · Inflammation of discs in your neck    · A condition that affects neck to arm nerves, such as thoracic outlet syndrome or brachial neuritis     · A trauma or injury to your neck, such as being hit from behind in a car (whiplash) or sleeping in a bad position    · Shingles, or an infection such as meningitis  How is the cause of acute neck pain diagnosed? Your healthcare provider will ask about your symptoms and when they began  Tell him if you were recently in an accident or had another injury to your neck  He will examine your neck and shoulders  He may also have you move your head in certain ways to see if any position causes or relieves the pain  · Blood tests  may be used to measure the amount of inflammation or to check for signs of an infection  · X-ray or MRI  pictures may show a neck injury or medical condition  Do not enter the MRI room with anything metal  Metal can cause serious damage  Tell the healthcare provider if you have any metal in or on your body  How is acute neck pain treated? Treatment will depend on what is causing your pain  · Medicines  may be prescribed or recommended by your healthcare provider for pain  You may need medicine to treat nerve pain or to stop muscle spasms  Medicines may also be given to reduce inflammation  Your healthcare provider may inject medicine into a nerve to block pain   Over-the-counter NSAID medicine or acetaminophen may be recommended to help treat minor pain or inflammation  · Traction  is used to relieve pressure from nerves  Your head is gently pulled up and away from your neck  This stretches muscles and ligaments and makes more room for the spine  Your healthcare provider will tell you the kind of traction that will help your neck pain  Do not use traction devices at home unless directed by your healthcare provider  What can I do to manage or prevent acute neck pain? · Rest your neck as directed  Do not make sudden movements, such as turning your head quickly  Your healthcare provider may recommend you wear a cervical collar for a short time  The collar will prevent you from moving your head  This will give your neck time to heal if an injury is causing your neck pain  Ask your healthcare provider when you can return to sports or other normal daily activities  · Apply heat as directed  Heat helps relieve pain and swelling  Use a heat wrap, or soak a small towel in warm water  Wring out the extra water  Apply the heat wrap or towel for 20 minutes every hour, or as directed  · Apply ice as directed  Ice helps relieve pain and swelling, and can help prevent tissue damage  Use an ice pack, or put ice in a bag  Cover the ice pack or back with a towel before you apply it to your neck  Apply the ice pack or ice for 15 minutes every hour, or as directed  Your healthcare provider can tell you how often to apply ice  · Do neck exercises as directed  Neck exercises help strengthen the muscles and increase range of motion  Your healthcare provider will tell you which exercises are right for you  He may give you instructions, or he may recommend that you work with a physical therapist  Your healthcare provider or therapist can make sure you are doing the exercises correctly  · Maintain good posture  Try to keep your head and shoulders lifted when you sit   If you work in front of a computer, make sure the monitor is at the right level  You should not need to look up down to see the screen  You should also not have to lean forward to be able to read what is on the screen  Make sure your keyboard, mouse, and other computer items are placed where you do not have to extend your shoulder to reach them  Get up often if you work in front of a computer or sit for long periods of time  Stretch or walk around to keep your neck muscles loose  When should I seek immediate care? · You have an injury that causes neck pain and shooting pain down your arms or legs  · Your neck pain suddenly becomes severe  · You have neck pain along with numbness, tingling, or weakness in your arms or legs  · You have a stiff neck, a headache, and a fever  When should I contact my healthcare provider? · You have new or worsening symptoms  · Your symptoms continue even after treatment  · You have questions or concerns about your condition or care  CARE AGREEMENT:   You have the right to help plan your care  Learn about your health condition and how it may be treated  Discuss treatment options with your caregivers to decide what care you want to receive  You always have the right to refuse treatment  The above information is an  only  It is not intended as medical advice for individual conditions or treatments  Talk to your doctor, nurse or pharmacist before following any medical regimen to see if it is safe and effective for you  © 2017 [employer]  Information is for End User's use only and may not be sold, redistributed or otherwise used for commercial purposes  All illustrations and images included in CareNotes® are the copyrighted property of [employer]  or [employer]  Cervical Strain   WHAT YOU NEED TO KNOW:   What is a cervical strain? A cervical strain is a stretched or torn muscle or tendon in your neck   Tendons are strong tissues that connect muscles to bones  Common causes of cervical strains include a car accident, a fall, or a sports injury  What are the signs and symptoms of a cervical strain? · Pain and stiffness    · Limited movement    · Headache    · Swelling or bruising    · Ringing in the ears    · Dizziness or vertigo  How is a cervical strain diagnosed? Your healthcare provider will check your movement, balance, and strength  An x-ray, CT, or MRI may show the injury  You may be given contrast liquid to help your neck show up better in the pictures  Tell the healthcare provider if you have ever had an allergic reaction to contrast liquid  Do not enter the MRI room with anything metal  Metal can cause serious injury  Tell the healthcare provider if you have any metal in or on your body  How is a cervical strain treated? You may need any of the following:  · Acetaminophen  decreases pain and fever  It is available without a doctor's order  Ask how much to take and how often to take it  Follow directions  Read the labels of all other medicines you are using to see if they also contain acetaminophen, or ask your doctor or pharmacist  Acetaminophen can cause liver damage if not taken correctly  Do not use more than 4 grams (4,000 milligrams) total of acetaminophen in one day  · NSAIDs , such as ibuprofen, help decrease swelling, pain, and fever  This medicine is available with or without a doctor's order  NSAIDs can cause stomach bleeding or kidney problems in certain people  If you take blood thinner medicine, always ask your healthcare provider if NSAIDs are safe for you  Always read the medicine label and follow directions  · Muscle relaxers  help decrease pain and muscle spasms  · Prescription pain medicine  may be given  Ask your healthcare provider how to take this medicine safely  Some prescription pain medicines contain acetaminophen   Do not take other medicines that contain acetaminophen without talking to your healthcare provider  Too much acetaminophen may cause liver damage  Prescription pain medicine may cause constipation  Ask your healthcare provider how to prevent or treat constipation  How can I manage my symptoms? · Apply heat  on your neck for 15 to 20 minutes, 4 to 6 times a day or as directed  Heat helps decrease pain, stiffness, and muscle spasms  · Begin gentle neck exercises  as soon as you can move your neck without pain  Exercises will help decrease stiffness and improve the strength and movement of your neck  Ask your healthcare provider what kind of exercises you should do  · Gradually return to your usual activities as directed  Stop if you have pain  Avoid activities that can cause more damage to your neck, such as heavy lifting or strenuous exercise  · Sleep without a pillow  to help decrease pain  Instead, roll a small towel tightly and place it under your neck  · Go to physical therapy as directed  A physical therapist teaches you exercises to help improve movement and strength, and to decrease pain  Prevent neck injury:   · Drive safely  Make sure everyone in your car wears a seatbelt  A seatbelt can save your life if you are in an accident  Do not use your cell phone when you are driving  This could distract you and cause an accident  Pull over if you need to make a call or send a text message  · Wear helmets, lifejackets, and protective gear  Always wear a helmet when you ride a bike or motorcycle, go skiing, or play sports that could cause a head injury  Wear protective equipment when you play sports  Wear a lifejacket when you are on a boat or doing water sports  When should I seek immediate care? · You have pain or numbness from your shoulder down to your hand  · You have problems with your vision, hearing, or balance  · You feel confused or cannot concentrate  · You have problems with movement and strength  When should I contact my healthcare provider?    · You have increased swelling or pain in your neck  · You have questions or concerns about your condition or care  CARE AGREEMENT:   You have the right to help plan your care  Learn about your health condition and how it may be treated  Discuss treatment options with your caregivers to decide what care you want to receive  You always have the right to refuse treatment  The above information is an  only  It is not intended as medical advice for individual conditions or treatments  Talk to your doctor, nurse or pharmacist before following any medical regimen to see if it is safe and effective for you  © 2017 [employer]  Information is for End User's use only and may not be sold, redistributed or otherwise used for commercial purposes  All illustrations and images included in CareNotes® are the copyrighted property of [employer]  or [employer]
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
## 2018-08-24 ENCOUNTER — OFFICE VISIT (OUTPATIENT)
Dept: GASTROENTEROLOGY | Facility: MEDICAL CENTER | Age: 48
End: 2018-08-24
Payer: COMMERCIAL

## 2018-08-24 VITALS
HEART RATE: 83 BPM | TEMPERATURE: 97.6 F | WEIGHT: 180.4 LBS | SYSTOLIC BLOOD PRESSURE: 104 MMHG | DIASTOLIC BLOOD PRESSURE: 60 MMHG | BODY MASS INDEX: 36.44 KG/M2

## 2018-08-24 DIAGNOSIS — R10.13 DYSPEPSIA: Primary | ICD-10-CM

## 2018-08-24 DIAGNOSIS — K59.1 FUNCTIONAL DIARRHEA: ICD-10-CM

## 2018-08-24 DIAGNOSIS — K29.70 GASTRITIS WITHOUT BLEEDING, UNSPECIFIED CHRONICITY, UNSPECIFIED GASTRITIS TYPE: ICD-10-CM

## 2018-08-24 DIAGNOSIS — Z12.11 COLON CANCER SCREENING: ICD-10-CM

## 2018-08-24 PROCEDURE — 99244 OFF/OP CNSLTJ NEW/EST MOD 40: CPT | Performed by: INTERNAL MEDICINE

## 2018-08-24 RX ORDER — ZINC OXIDE 13 %
1 CREAM (GRAM) TOPICAL DAILY
Qty: 30 CAPSULE | Refills: 0 | Status: SHIPPED | OUTPATIENT
Start: 2018-08-24 | End: 2018-09-23

## 2018-08-24 RX ORDER — SODIUM, POTASSIUM,MAG SULFATES 17.5-3.13G
1 SOLUTION, RECONSTITUTED, ORAL ORAL ONCE
Qty: 2 BOTTLE | Refills: 0 | Status: SHIPPED | OUTPATIENT
Start: 2018-08-24 | End: 2018-09-24 | Stop reason: ALTCHOICE

## 2018-08-24 NOTE — PROGRESS NOTES
Assessment/Plan:    Dyspepsia  She has abdominal pain, bloating, nausea, diarrhea  Likely non ulcer dyspepsia vs IBS with diarrhea vs SIBO  Consider EGD and Colonoscopy with duodenal biopsies and random colon biopsies  Avoid dairy  Functional diarrhea  Check stool studies for infectious etiologies  Colon cancer screening  Average risk for colon cancer screening  Starting based on age 39 recommendations per the cancer society  However, she is also having diarrhea and will need random colon biopsies  Diagnoses and all orders for this visit:    Dyspepsia  -     Case request operating room: EGD AND COLONOSCOPY; Standing  -     Case request operating room: EGD AND COLONOSCOPY  -     Probiotic Product (PROBIOTIC DAILY) CAPS; Take 1 capsule by mouth daily for 30 days May use any over the counter brand - align, equate ([employer]), culturelle, bhatia or other store brands  Gastritis without bleeding, unspecified chronicity, unspecified gastritis type  -     Ambulatory referral to Gastroenterology    Functional diarrhea  -     Case request operating room: EGD AND COLONOSCOPY; Standing  -     Stool Enteric Bacterial Panel by PCR; Future  -     Fecal leukocytes; Future  -     Ova and parasite examination; Future  -     Case request operating room: EGD AND COLONOSCOPY  -     Probiotic Product (PROBIOTIC DAILY) CAPS; Take 1 capsule by mouth daily for 30 days May use any over the counter brand - align, equate ([employer]), culturelle, bhatia or other store brands  Colon cancer screening  -     Case request operating room: EGD AND COLONOSCOPY; Standing  -     Case request operating room: EGD AND COLONOSCOPY  -     SUPREP BOWEL PREP KIT 17 5-3 13-1 6 GM/180ML SOLN; Take 1 kit by mouth once for 1 dose Please follow instructions as per the office  Other orders  -     Diet NPO; Sips with meds; Standing  -     Void on call to OR; Standing  -     Insert peripheral IV;  Standing          Subjective: Patient ID: Radha Mart is a 52 y o  female  HPI    She presents with a myriad of complaints  In May she was having pain in the right lower back and had a visit to the ER and had a CT scan done and US done  This showed gallstones  Mild right hydroureteronephrosis without an obstructive right ureteral calculus could indicate a recently passed calculus or a nonradiopaque obstructive lesion such as thrombus, infectious debris, stricture or tumor  Follow-up with delayed postcontrast images through the kidney, ureter and bladder if the patient has persistent pain  She also has pain in the mid abdomen and radiates to epigastric area  The pain gets worse with certain eating  Milk also makes it worse  She feels bloated  She has diarrhea intermittently  This has been worse over past 2 weeks  This has been intermittent in the past few months since May  Having a BM would not improve her symptoms  She was started on omeprazole in May  It seems to have helped some symptoms  This does not help a 100%  She has not lost weight  She has infact gained weight  The BM are throughout the day  It is not nocturnal  No sick contacts  No recent antibiotics  No travel history  She has nausea and occasional vomiting  She has had no bleeding  She has never had an EGD or Colonoscopy done  Labs in 4/18 were unremarkable except for slightly elevated eosinophils  The following portions of the patient's history were reviewed and updated as appropriate: allergies, current medications, past family history, past medical history, past social history, past surgical history and problem list     Review of Systems   Constitutional: Positive for fatigue  HENT: Negative  Eyes: Negative  Respiratory: Negative  Cardiovascular: Negative  Gastrointestinal:        See HPI   Endocrine: Negative  Genitourinary: Negative  Musculoskeletal: Positive for arthralgias  Skin: Negative  Allergic/Immunologic: Negative  Neurological: Negative  Hematological: Negative  Psychiatric/Behavioral: Negative  All other systems reviewed and are negative  Objective:      /60 (BP Location: Left arm, Patient Position: Sitting, Cuff Size: Standard)   Pulse 83   Temp 97 6 °F (36 4 °C) (Tympanic)   Wt 81 8 kg (180 lb 6 4 oz)   BMI 36 44 kg/m²          Physical Exam   Constitutional: She is oriented to person, place, and time  Vital signs are normal  She appears well-developed and well-nourished  HENT:   Head: Normocephalic and atraumatic  Eyes: Conjunctivae are normal  Pupils are equal, round, and reactive to light  No scleral icterus  Neck: Normal range of motion  Cardiovascular: Normal rate, regular rhythm and normal heart sounds  Pulmonary/Chest: Effort normal and breath sounds normal  No respiratory distress  Abdominal: Soft  Normal appearance and bowel sounds are normal  She exhibits no distension, no ascites and no mass  There is no hepatosplenomegaly  There is no tenderness  No hernia  Musculoskeletal: Normal range of motion  Lymphadenopathy:     She has no cervical adenopathy  Neurological: She is alert and oriented to person, place, and time  Skin: Skin is warm  Psychiatric: She has a normal mood and affect   Her behavior is normal  Thought content normal

## 2018-08-24 NOTE — ASSESSMENT & PLAN NOTE
Average risk for colon cancer screening  Starting based on age 39 recommendations per the cancer society  However, she is also having diarrhea and will need random colon biopsies

## 2018-08-24 NOTE — PATIENT INSTRUCTIONS
1  Probiotics  2  Stop omeprazole and take Zantac 150 mg over the counter instead  3  Plan for EGD and Colonoscopy  4  Doxycycline 100 mg twice daily for 10 days  5  Avoid dairy  The patient is scheduled on 9/12/18 for a colon/egd at [facility]  I went over the Suprep instructions with her today  She is aware she will be called the day prior with an arrival time

## 2018-08-24 NOTE — ASSESSMENT & PLAN NOTE
She has abdominal pain, bloating, nausea, diarrhea  Likely non ulcer dyspepsia vs IBS with diarrhea vs SIBO  Consider EGD and Colonoscopy with duodenal biopsies and random colon biopsies  Avoid dairy

## 2018-09-11 ENCOUNTER — ANESTHESIA EVENT (OUTPATIENT)
Dept: GASTROENTEROLOGY | Facility: MEDICAL CENTER | Age: 48
End: 2018-09-11
Payer: COMMERCIAL

## 2018-09-12 ENCOUNTER — ANESTHESIA (OUTPATIENT)
Dept: GASTROENTEROLOGY | Facility: MEDICAL CENTER | Age: 48
End: 2018-09-12
Payer: COMMERCIAL

## 2018-09-12 ENCOUNTER — HOSPITAL ENCOUNTER (OUTPATIENT)
Facility: MEDICAL CENTER | Age: 48
Setting detail: OUTPATIENT SURGERY
Discharge: HOME | End: 2018-09-12
Attending: INTERNAL MEDICINE | Admitting: INTERNAL MEDICINE
Payer: COMMERCIAL

## 2018-09-12 VITALS
SYSTOLIC BLOOD PRESSURE: 106 MMHG | DIASTOLIC BLOOD PRESSURE: 62 MMHG | RESPIRATION RATE: 19 BRPM | HEART RATE: 76 BPM | HEIGHT: 59 IN | WEIGHT: 178 LBS | BODY MASS INDEX: 35.88 KG/M2 | TEMPERATURE: 98.3 F | OXYGEN SATURATION: 97 %

## 2018-09-12 DIAGNOSIS — R10.13 DYSPEPSIA: ICD-10-CM

## 2018-09-12 DIAGNOSIS — K59.1 FUNCTIONAL DIARRHEA: ICD-10-CM

## 2018-09-12 DIAGNOSIS — Z12.11 COLON CANCER SCREENING: ICD-10-CM

## 2018-09-12 PROCEDURE — 45385 COLONOSCOPY W/LESION REMOVAL: CPT | Performed by: INTERNAL MEDICINE

## 2018-09-12 PROCEDURE — 88342 IMHCHEM/IMCYTCHM 1ST ANTB: CPT | Performed by: PATHOLOGY

## 2018-09-12 PROCEDURE — 43239 EGD BIOPSY SINGLE/MULTIPLE: CPT | Performed by: INTERNAL MEDICINE

## 2018-09-12 PROCEDURE — 88305 TISSUE EXAM BY PATHOLOGIST: CPT | Performed by: PATHOLOGY

## 2018-09-12 RX ORDER — KETOROLAC TROMETHAMINE 30 MG/ML
INJECTION, SOLUTION INTRAMUSCULAR; INTRAVENOUS AS NEEDED
Status: DISCONTINUED | OUTPATIENT
Start: 2018-09-12 | End: 2018-09-12 | Stop reason: SURG

## 2018-09-12 RX ORDER — ONDANSETRON 2 MG/ML
INJECTION INTRAMUSCULAR; INTRAVENOUS AS NEEDED
Status: DISCONTINUED | OUTPATIENT
Start: 2018-09-12 | End: 2018-09-12 | Stop reason: SURG

## 2018-09-12 RX ORDER — LIDOCAINE HYDROCHLORIDE 20 MG/ML
INJECTION, SOLUTION EPIDURAL; INFILTRATION; INTRACAUDAL; PERINEURAL AS NEEDED
Status: DISCONTINUED | OUTPATIENT
Start: 2018-09-12 | End: 2018-09-12 | Stop reason: SURG

## 2018-09-12 RX ORDER — SODIUM CHLORIDE 9 MG/ML
125 INJECTION, SOLUTION INTRAVENOUS CONTINUOUS
Status: DISCONTINUED | OUTPATIENT
Start: 2018-09-12 | End: 2018-09-12 | Stop reason: HOSPADM

## 2018-09-12 RX ORDER — PROPOFOL 10 MG/ML
INJECTION, EMULSION INTRAVENOUS AS NEEDED
Status: DISCONTINUED | OUTPATIENT
Start: 2018-09-12 | End: 2018-09-12 | Stop reason: SURG

## 2018-09-12 RX ORDER — PROPOFOL 10 MG/ML
INJECTION, EMULSION INTRAVENOUS CONTINUOUS PRN
Status: DISCONTINUED | OUTPATIENT
Start: 2018-09-12 | End: 2018-09-12 | Stop reason: SURG

## 2018-09-12 RX ADMIN — PROPOFOL 20 MG: 10 INJECTION, EMULSION INTRAVENOUS at 13:41

## 2018-09-12 RX ADMIN — KETOROLAC TROMETHAMINE 30 MG: 30 INJECTION, SOLUTION INTRAMUSCULAR at 14:05

## 2018-09-12 RX ADMIN — PROPOFOL 110 MG: 10 INJECTION, EMULSION INTRAVENOUS at 13:39

## 2018-09-12 RX ADMIN — ONDANSETRON 4 MG: 2 INJECTION INTRAMUSCULAR; INTRAVENOUS at 14:04

## 2018-09-12 RX ADMIN — SODIUM CHLORIDE: 0.9 INJECTION, SOLUTION INTRAVENOUS at 14:06

## 2018-09-12 RX ADMIN — PROPOFOL 30 MG: 10 INJECTION, EMULSION INTRAVENOUS at 13:56

## 2018-09-12 RX ADMIN — PROPOFOL 20 MG: 10 INJECTION, EMULSION INTRAVENOUS at 13:43

## 2018-09-12 RX ADMIN — SODIUM CHLORIDE: 0.9 INJECTION, SOLUTION INTRAVENOUS at 13:38

## 2018-09-12 RX ADMIN — PROPOFOL 190 MCG/KG/MIN: 10 INJECTION, EMULSION INTRAVENOUS at 13:43

## 2018-09-12 RX ADMIN — LIDOCAINE HYDROCHLORIDE 3 ML: 20 INJECTION, SOLUTION EPIDURAL; INFILTRATION; INTRACAUDAL; PERINEURAL at 13:43

## 2018-09-12 NOTE — OP NOTE
OPERATIVE REPORT  PATIENT NAME: Jadon Jackson    :  1970  MRN: [id number]  Pt Location: [facility] GI ROOM 01    SURGERY DATE: 2018    Surgeon(s) and Role:     Frida Tadeo MD - Primary    Preop Diagnosis:  Functional diarrhea [K59 1]  Dyspepsia [R10 13]  Colon cancer screening [Z12 11]    Post-Op Diagnosis Codes:     * Functional diarrhea [K59 1]     * Dyspepsia [R10 13]     * Colon cancer screening [Z12 11]    Procedure(s) (LRB):  EGD AND COLONOSCOPY (N/A)    Specimen(s):  ID Type Source Tests Collected by Time Destination   1 : gastric body bx's r/o H pylori Tissue Stomach TISSUE EXAM April Gonzales MD 2018 1341    2 : cold bx's r/o celiac, dyspepsia Tissue Duodenum TISSUE EXAM April Gonzales MD 2018 1342    3 : polyp cold snare Tissue Large Intestine, Right/Ascending Colon TISSUE Alina Piedra MD 2018 1354    4 : random colon bx's Tissue Colon TISSUE EXAM April Gonzales MD 2018 1356    5 : cold snare polyp Tissue Large Intestine, Sigmoid Colon TISSUE EXAM April Gonzales MD 2018 1400      COLONOSCOPY    PROCEDURE: Colonoscopy/ Biopsy  Polypectomy (Cold Snare)    INDICATIONS: Screening for Colon Cancer, Diarrhea    POST-OP DIAGNOSIS: See the impression below    SEDATION: Monitored anesthesia care, check anesthesia records    PHYSICAL EXAM:    /66   Pulse 65   Temp 98 3 °F (36 8 °C) (Temporal)   Resp 16   Ht 4' 10 5" (1 486 m)   Wt 80 7 kg (178 lb)   SpO2 98%   BMI 36 57 kg/m²   Body mass index is 36 57 kg/m²  General: NAD  Heart: S1 & S2 normal, RRR  Lungs: CTA, No rales or rhonchi  Abdomen: Soft, nontender, nondistended, good bowel sounds    CONSENT:  Informed consent was obtained for the procedure, including sedation after explaining the risks and benefits of the procedure  Risks including but not limited to bleeding, perforation, infection, aspiration were discussed in detail  Also explained about less than 100%$ sensitivity with the exam and other alternatives  PREPARATION:   EKG tracing, pulse oximetry, blood pressure were monitored throughout the procedure  Patient was identified by myself both verbally and by visual inspection of ID band  DESCRIPTION:   Patient was placed in the left lateral decubitus position and was sedated with the above medication  Digital rectal examination was performed  The colonoscope was introduced in to the anal canal and advanced up to cecum, which was identified by the appendiceal orifice and IC valve  A careful inspection was made as the colonoscope was withdrawn, including a retroflexed view of the rectum; findings and interventions are described below  Appropriate photodocumentation was obtained  The quality of the colonic preparation was good  FINDINGS:    One small polyp was seen in the ascending colon and 1 small polyp was seen in the sigmoid colon  Both were removed by cold snare polypectomy  The rest of the colon appeared to be normal  Random biopsies were done  IMPRESSIONS:      1   Small polyp in the ascending colon and 1 small polyp in the sigmoid colon  Removed by cold snare polypectomy  2   Otherwise normal colon  Random biopsies were done  RECOMMENDATIONS:    1  Follow up with the results of the biopsies with Dr Neela Tellez in 2 weeks at [phone]    2   If the polyps are adenomatous and repeat colonoscopy in 5 years other was a 10 years unless there are any symptoms or family history of colon polyps or colon cancer  COMPLICATIONS:  None; patient tolerated the procedure well      DISPOSITION: PACU           CONDITION: Stable

## 2018-09-12 NOTE — ANESTHESIA PREPROCEDURE EVALUATION
Review of Systems/Medical History  Patient summary reviewed  Chart reviewed      Cardiovascular  Negative cardio ROS Exercise tolerance (METS): good,     Pulmonary  Negative pulmonary ROS        GI/Hepatic    GERD , Bowel prep       Kidney stones,   Comment: Single kidney       Endo/Other  Negative endo/other ROS      GYN  Negative gynecology ROS          Hematology  Negative hematology ROS      Musculoskeletal  Negative musculoskeletal ROS        Neurology    Headaches,    Psychology   Negative psychology ROS              Physical Exam    Airway    Mallampati score: II  TM Distance: <3 FB  Neck ROM: full     Dental   No notable dental hx     Cardiovascular  Comment: Negative ROS, Rhythm: regular, Rate: normal, Cardiovascular exam normal    Pulmonary  Pulmonary exam normal Breath sounds clear to auscultation,     Other Findings        Anesthesia Plan  ASA Score- 2     Anesthesia Type- IV sedation with anesthesia with ASA Monitors  Additional Monitors:   Airway Plan:         Plan Factors- Patient instructed to abstain from smoking on day of procedure  Patient did not smoke on day of surgery  Induction- intravenous  Postoperative Plan-     Informed Consent- Anesthetic plan and risks discussed with patient

## 2018-09-12 NOTE — DISCHARGE INSTRUCTIONS
Gastritis   WHAT YOU NEED TO KNOW:   Gastritis is inflammation or irritation of the lining of your stomach  DISCHARGE INSTRUCTIONS:   Call 911 for any of the following:   · You develop chest pain or shortness of breath  Seek care immediately if:   · You vomit blood  · You have black or bloody bowel movements  · You have severe stomach or back pain  Contact your healthcare provider if:   · You have a fever  · You have new or worsening symptoms, even after treatment  · You have questions or concerns about your condition or care  Medicines:   · Medicines  may be given to help treat a bacterial infection or decrease stomach acid  · Take your medicine as directed  Contact your healthcare provider if you think your medicine is not helping or if you have side effects  Tell him or her if you are allergic to any medicine  Keep a list of the medicines, vitamins, and herbs you take  Include the amounts, and when and why you take them  Bring the list or the pill bottles to follow-up visits  Carry your medicine list with you in case of an emergency  Manage or prevent gastritis:   · Do not smoke  Nicotine and other chemicals in cigarettes and cigars can make your symptoms worse and cause lung damage  Ask your healthcare provider for information if you currently smoke and need help to quit  E-cigarettes or smokeless tobacco still contain nicotine  Talk to your healthcare provider before you use these products  · Do not drink alcohol  Alcohol can prevent healing and make your gastritis worse  Talk to your healthcare provider if you need help to stop drinking  · Do not take NSAIDs or aspirin unless directed  These and similar medicines can cause irritation  If your healthcare provider says it is okay to take NSAIDs, take them with food  · Do not eat foods that cause irritation  Foods such as oranges and salsa can cause burning or pain  Eat a variety of healthy foods   Examples include fruits (not citrus), vegetables, low-fat dairy products, beans, whole-grain breads, and lean meats and fish  Try to eat small meals, and drink water with your meals  Do not eat for at least 3 hours before you go to bed  · Find ways to relax and decrease stress  Stress can increase stomach acid and make gastritis worse  Activities such as yoga, meditation, or listening to music can help you relax  Spend time with friends, or do things you enjoy  Follow up with your healthcare provider as directed: You may need ongoing tests or treatment, or referral to a gastroenterologist  Write down your questions so you remember to ask them during your visits  © 2017 [employer] Information is for End User's use only and may not be sold, redistributed or otherwise used for commercial purposes  All illustrations and images included in CareNotes® are the copyrighted property of A D A M , Inc  or [employer]  The above information is an  only  It is not intended as medical advice for individual conditions or treatments  Talk to your doctor, nurse or pharmacist before following any medical regimen to see if it is safe and effective for you  Upper Endoscopy   WHAT YOU NEED TO KNOW:   An upper endoscopy is also called an upper gastrointestinal (GI) endoscopy, or an esophagogastroduodenoscopy (EGD)  You may feel bloated, gassy, or have some abdominal discomfort after your procedure  Your throat may be sore for 24 to 36 hours  You may burp or pass gas from air that is still inside your body  DISCHARGE INSTRUCTIONS:   Call 911 if:   · You have sudden chest pain or trouble breathing  Seek care immediately if:   · You feel dizzy or faint  · You have trouble swallowing  · You have severe throat pain  · Your bowel movements are very dark or black  · Your abdomen is hard and firm and you have severe pain  · You vomit blood    Contact your healthcare provider if: · You feel full or bloated and cannot burp or pass gas  · You have not had a bowel movement for 3 days after your procedure  · You have neck pain  · You have a fever or chills  · You have nausea or are vomiting  · You have a rash or hives  · You have questions or concerns about your endoscopy  Relieve a sore throat:  Suck on throat lozenges or crushed ice  Gargle with a small amount of warm salt water  Mix 1 teaspoon of salt and 1 cup of warm water to make salt water  Relieve gas and discomfort from bloating:  Lie on your right side with a heating pad on your abdomen  Take short walks to help pass gas  Eat small meals until bloating is relieved  Rest after your procedure:  Do not drive or make important decisions until the day after your procedure  Return to your normal activity as directed  You can usually return to work the day after your procedure  Follow up with your healthcare provider as directed:  Write down your questions so you remember to ask them during your visits  © 2017 [address] [facility] Information is for End User's use only and may not be sold, redistributed or otherwise used for commercial purposes  All illustrations and images included in CareNotes® are the copyrighted property of A D A M , Inc  or [employer]  The above information is an  only  It is not intended as medical advice for individual conditions or treatments  Talk to your doctor, nurse or pharmacist before following any medical regimen to see if it is safe and effective for you  Colorectal Polyps   WHAT YOU NEED TO KNOW:   Colorectal polyps are small growths of tissue in the lining of the colon and rectum  Most polyps are hyperplastic polyps and are usually benign (noncancerous)  Certain types of polyps, called adenomatous polyps, may turn into cancer  DISCHARGE INSTRUCTIONS:   Follow up with your healthcare provider or gastroenterologist as directed:   You may need to return for more tests, such as another colonoscopy  Write down your questions so you remember to ask them during your visits  Reduce your risk for colorectal polyps:   · Eat a variety of healthy foods:  Healthy foods include fruit, vegetables, whole-grain breads, low-fat dairy products, beans, lean meat, and fish  Ask if you need to be on a special diet  · Maintain a healthy weight:  Ask your healthcare provider if you need to lose weight and how much you need to lose  Ask for help with a weight loss program     · Exercise:  Begin to exercise slowly and do more as you get stronger  Talk with your healthcare provider before you start an exercise program      · Limit alcohol:  Your risk for polyps increases the more you drink  · Do not smoke: If you smoke, it is never too late to quit  Ask for information about how to stop  For support and more information:   · Ludmila Bruno ([facility])  [address], [place] [address]  Phone: [phone]  Web Address: www digestive  niddk nih gov  Contact your healthcare provider or gastroenterologist if:   · You have a fever  · You have chills, a cough, or feel weak and achy  · You have abdominal pain that does not go away or gets worse after you take medicine  · Your abdomen is swollen  · You are losing weight without trying  · You have questions or concerns about your condition or care  Seek care immediately or call 911 if:   · You have sudden shortness of breath  · You have a fast heart rate, fast breathing, or are too dizzy to stand up  · You have severe abdominal pain  · You see blood in your bowel movement  © 2017 [employer]  Information is for End User's use only and may not be sold, redistributed or otherwise used for commercial purposes   All illustrations and images included in CareNotes® are the copyrighted property of [employer] D [employer] M , Inc  or [facility] [employer]  The above information is an  only  It is not intended as medical advice for individual conditions or treatments  Talk to your doctor, nurse or pharmacist before following any medical regimen to see if it is safe and effective for you

## 2018-09-12 NOTE — DISCHARGE INSTR - AVS FIRST PAGE
OPERATIVE REPORT  PATIENT NAME: Arturo Ruvalcaba    :  1970  MRN: [id number]  Pt Location: [facility] GI ROOM 01    SURGERY DATE: 2018    Surgeon(s) and Role:     Toñito Mata MD - Primary    Preop Diagnosis:  Functional diarrhea [K59 1]  Dyspepsia [R10 13]  Colon cancer screening [Z12 11]    Post-Op Diagnosis Codes:     * Functional diarrhea [K59 1]     * Dyspepsia [R10 13]     * Colon cancer screening [Z12 11]    Procedure(s) (LRB):  EGD AND COLONOSCOPY (N/A)    Specimen(s):  ID Type Source Tests Collected by Time Destination   1 : gastric body bx's r/o H pylori Tissue Stomach TISSUE EXAM Mary Issa MD 2018 1341    2 : cold bx's r/o celiac, dyspepsia Tissue Duodenum TISSUE EXAM Mary Issa MD 2018 1342      ESOPHAGOGASTRODUODENOSCOPY    PROCEDURE: EGD/ Biopsy    INDICATIONS: Dyspepsia    POST-OP DIAGNOSIS: See the impression below    SEDATION: Monitored anesthesia care, check anesthesia records    PHYSICAL EXAM:    Vitals:    18 1245   BP: 120/66   Pulse: 65   Resp: 16   Temp: 98 3 °F (36 8 °C)   SpO2: 98%    Body mass index is 36 57 kg/m²  General: NAD  Heart: S1 & S2 normal, RRR  Lungs: CTA, No rales or rhonchi  Abdomen: Soft, nontender, nondistended, good bowel sounds    CONSENT:  Informed consent was obtained for the procedure, including sedation after explaining the risks and benefits of the procedure  Risks including but not limited to bleeding, perforation, infection, aspiration were discussed in detail  Also explained about less than 100% sensitivity with the exam and other alternatives  PREPARATION:   EKG tracing, pulse oximetry, blood pressure were monitored throughout the procedure  Patient was identified by myself both verbally and by visual inspection of ID band  DESCRIPTION:   Patient was placed in the left lateral decubitus position and was sedated with the above medication   The gastroscope was introduced in to the oropharynx and the esophagus was intubated under direct visualization  Scope was passed down the esophagus up to 2nd part of the duodenum  A careful inspection was made as the gastroscope was withdrawn, including a retroflexed view of the stomach; findings and interventions are described below  FINDINGS:    #1  Esophagus and GEJ- esophagus was normal  GE junction was at 37 centimeters  #2  Stomach- mild nonerosive gastritis in the gastric body  Two random biopsies were done  Otherwise normal stomach  #3  Duodenum- normal duodenal bulb, 2nd portion and 3rd portion of the duodenum  Random biopsies were done to rule out celiac disease  IMPRESSIONS:      1  Normal esophagus  2   Mild nonerosive gastritis  Two biopsies were done to rule out H pylori  3   Normal duodenum  Biopsies were done to rule out celiac disease  RECOMMENDATIONS:     1  Follow up with the results of the biopsies with Dr Rachel You in 2 weeks at   COMPLICATIONS:  None; patient tolerated the procedure well            DISPOSITION: PACU           CONDITION: Stable      OPERATIVE REPORT  PATIENT NAME: Jadon Jackson    :  1970  MRN: [id number]  Pt Location: [facility] GI ROOM 01    SURGERY DATE: 2018    Surgeon(s) and Role:     Frida Tadeo MD - Primary    Preop Diagnosis:  Functional diarrhea [K59 1]  Dyspepsia [R10 13]  Colon cancer screening [Z12 11]    Post-Op Diagnosis Codes:     * Functional diarrhea [K59 1]     * Dyspepsia [R10 13]     * Colon cancer screening [Z12 11]    Procedure(s) (LRB):  EGD AND COLONOSCOPY (N/A)    Specimen(s):  ID Type Source Tests Collected by Time Destination   1 : gastric body bx's r/o H pylori Tissue Stomach TISSUE EXAM April Gonzales MD 2018 1341    2 : cold bx's r/o celiac, dyspepsia Tissue Duodenum TISSUE EXAM April Gonzales MD 2018 1342    3 : polyp cold snare Tissue Large Intestine, Right/Ascending Colon TISSUE EXAM April Gonzales MD 2018 1354    4 : random colon bx's Tissue Colon TISSUE EXAM April Gonzales MD 9/12/2018 1356    5 : cold snare polyp Tissue Large Intestine, Sigmoid Colon TISSUE EXAM Jr Houston MD 9/12/2018 1400      COLONOSCOPY    PROCEDURE: Colonoscopy/ Biopsy  Polypectomy (Cold Snare)    INDICATIONS: Screening for Colon Cancer, Diarrhea    POST-OP DIAGNOSIS: See the impression below    SEDATION: Monitored anesthesia care, check anesthesia records    PHYSICAL EXAM:    /66   Pulse 65   Temp 98 3 °F (36 8 °C) (Temporal)   Resp 16   Ht 4' 10 5" (1 486 m)   Wt 80 7 kg (178 lb)   SpO2 98%   BMI 36 57 kg/m²    Body mass index is 36 57 kg/m²  General: NAD  Heart: S1 & S2 normal, RRR  Lungs: CTA, No rales or rhonchi  Abdomen: Soft, nontender, nondistended, good bowel sounds    CONSENT:  Informed consent was obtained for the procedure, including sedation after explaining the risks and benefits of the procedure  Risks including but not limited to bleeding, perforation, infection, aspiration were discussed in detail  Also explained about less than 100%$ sensitivity with the exam and other alternatives  PREPARATION:   EKG tracing, pulse oximetry, blood pressure were monitored throughout the procedure  Patient was identified by myself both verbally and by visual inspection of ID band  DESCRIPTION:   Patient was placed in the left lateral decubitus position and was sedated with the above medication  Digital rectal examination was performed  The colonoscope was introduced in to the anal canal and advanced up to cecum, which was identified by the appendiceal orifice and IC valve  A careful inspection was made as the colonoscope was withdrawn, including a retroflexed view of the rectum; findings and interventions are described below  Appropriate photodocumentation was obtained  The quality of the colonic preparation was good  FINDINGS:    One small polyp was seen in the ascending colon and 1 small polyp was seen in the sigmoid colon  Both were removed by cold snare polypectomy    The rest of the colon appeared to be normal  Random biopsies were done  IMPRESSIONS:      1   Small polyp in the ascending colon and 1 small polyp in the sigmoid colon  Removed by cold snare polypectomy  2   Otherwise normal colon  Random biopsies were done  RECOMMENDATIONS:    1  Follow up with the results of the biopsies with Dr Bal Munoz in 2 weeks at [phone]    2   If the polyps are adenomatous and repeat colonoscopy in 5 years other was a 10 years unless there are any symptoms or family history of colon polyps or colon cancer  COMPLICATIONS:  None; patient tolerated the procedure well      DISPOSITION: PACU           CONDITION: Stable

## 2018-09-12 NOTE — OP NOTE
OPERATIVE REPORT  PATIENT NAME: Almita Coleman    :  1970  MRN: [id number]  Pt Location: [facility] GI ROOM 01    SURGERY DATE: 2018    Surgeon(s) and Role:     Yaz Monge MD - Primary    Preop Diagnosis:  Functional diarrhea [K59 1]  Dyspepsia [R10 13]  Colon cancer screening [Z12 11]    Post-Op Diagnosis Codes:     * Functional diarrhea [K59 1]     * Dyspepsia [R10 13]     * Colon cancer screening [Z12 11]    Procedure(s) (LRB):  EGD AND COLONOSCOPY (N/A)    Specimen(s):  ID Type Source Tests Collected by Time Destination   1 : gastric body bx's r/o H pylori Tissue Stomach TISSUE EXAM Fito Whaley MD 2018 1341    2 : cold bx's r/o celiac, dyspepsia Tissue Duodenum TISSUE EXAM Fito Whaley MD 2018 1342      ESOPHAGOGASTRODUODENOSCOPY    PROCEDURE: EGD/ Biopsy    INDICATIONS: Dyspepsia    POST-OP DIAGNOSIS: See the impression below    SEDATION: Monitored anesthesia care, check anesthesia records    PHYSICAL EXAM:    Vitals:    18 1245   BP: 120/66   Pulse: 65   Resp: 16   Temp: 98 3 °F (36 8 °C)   SpO2: 98%    Body mass index is 36 57 kg/m²  General: NAD  Heart: S1 & S2 normal, RRR  Lungs: CTA, No rales or rhonchi  Abdomen: Soft, nontender, nondistended, good bowel sounds    CONSENT:  Informed consent was obtained for the procedure, including sedation after explaining the risks and benefits of the procedure  Risks including but not limited to bleeding, perforation, infection, aspiration were discussed in detail  Also explained about less than 100% sensitivity with the exam and other alternatives  PREPARATION:   EKG tracing, pulse oximetry, blood pressure were monitored throughout the procedure  Patient was identified by myself both verbally and by visual inspection of ID band  DESCRIPTION:   Patient was placed in the left lateral decubitus position and was sedated with the above medication   The gastroscope was introduced in to the oropharynx and the esophagus was intubated under direct visualization  Scope was passed down the esophagus up to 2nd part of the duodenum  A careful inspection was made as the gastroscope was withdrawn, including a retroflexed view of the stomach; findings and interventions are described below  FINDINGS:    #1  Esophagus and GEJ- esophagus was normal  GE junction was at 37 centimeters  #2  Stomach- mild nonerosive gastritis in the gastric body  Two random biopsies were done  Otherwise normal stomach  #3  Duodenum- normal duodenal bulb, 2nd portion and 3rd portion of the duodenum  Random biopsies were done to rule out celiac disease  IMPRESSIONS:      1  Normal esophagus  2   Mild nonerosive gastritis  Two biopsies were done to rule out H pylori  3   Normal duodenum  Biopsies were done to rule out celiac disease  RECOMMENDATIONS:     1  Follow up with the results of the biopsies with Dr Antony Salas in 2 weeks at   COMPLICATIONS:  None; patient tolerated the procedure well            DISPOSITION: PACU           CONDITION: Stable

## 2018-09-12 NOTE — H&P (VIEW-ONLY)
Assessment/Plan:    Dyspepsia  She has abdominal pain, bloating, nausea, diarrhea  Likely non ulcer dyspepsia vs IBS with diarrhea vs SIBO  Consider EGD and Colonoscopy with duodenal biopsies and random colon biopsies  Avoid dairy  Functional diarrhea  Check stool studies for infectious etiologies  Colon cancer screening  Average risk for colon cancer screening  Starting based on age 39 recommendations per the cancer society  However, she is also having diarrhea and will need random colon biopsies  Diagnoses and all orders for this visit:    Dyspepsia  -     Case request operating room: EGD AND COLONOSCOPY; Standing  -     Case request operating room: EGD AND COLONOSCOPY  -     Probiotic Product (PROBIOTIC DAILY) CAPS; Take 1 capsule by mouth daily for 30 days May use any over the counter brand - align, equate ([employer]), culturelle, bhatia or other store brands  Gastritis without bleeding, unspecified chronicity, unspecified gastritis type  -     Ambulatory referral to Gastroenterology    Functional diarrhea  -     Case request operating room: EGD AND COLONOSCOPY; Standing  -     Stool Enteric Bacterial Panel by PCR; Future  -     Fecal leukocytes; Future  -     Ova and parasite examination; Future  -     Case request operating room: EGD AND COLONOSCOPY  -     Probiotic Product (PROBIOTIC DAILY) CAPS; Take 1 capsule by mouth daily for 30 days May use any over the counter brand - align, equate ([employer]), culturelle, bhatia or other store brands  Colon cancer screening  -     Case request operating room: EGD AND COLONOSCOPY; Standing  -     Case request operating room: EGD AND COLONOSCOPY  -     SUPREP BOWEL PREP KIT 17 5-3 13-1 6 GM/180ML SOLN; Take 1 kit by mouth once for 1 dose Please follow instructions as per the office  Other orders  -     Diet NPO; Sips with meds; Standing  -     Void on call to OR; Standing  -     Insert peripheral IV;  Standing          Subjective: Patient ID: Luis Hendrickson is a 52 y o  female  HPI    She presents with a myriad of complaints  In May she was having pain in the right lower back and had a visit to the ER and had a CT scan done and US done  This showed gallstones  Mild right hydroureteronephrosis without an obstructive right ureteral calculus could indicate a recently passed calculus or a nonradiopaque obstructive lesion such as thrombus, infectious debris, stricture or tumor  Follow-up with delayed postcontrast images through the kidney, ureter and bladder if the patient has persistent pain  She also has pain in the mid abdomen and radiates to epigastric area  The pain gets worse with certain eating  Milk also makes it worse  She feels bloated  She has diarrhea intermittently  This has been worse over past 2 weeks  This has been intermittent in the past few months since May  Having a BM would not improve her symptoms  She was started on omeprazole in May  It seems to have helped some symptoms  This does not help a 100%  She has not lost weight  She has infact gained weight  The BM are throughout the day  It is not nocturnal  No sick contacts  No recent antibiotics  No travel history  She has nausea and occasional vomiting  She has had no bleeding  She has never had an EGD or Colonoscopy done  Labs in 4/18 were unremarkable except for slightly elevated eosinophils  The following portions of the patient's history were reviewed and updated as appropriate: allergies, current medications, past family history, past medical history, past social history, past surgical history and problem list     Review of Systems   Constitutional: Positive for fatigue  HENT: Negative  Eyes: Negative  Respiratory: Negative  Cardiovascular: Negative  Gastrointestinal:        See HPI   Endocrine: Negative  Genitourinary: Negative  Musculoskeletal: Positive for arthralgias  Skin: Negative  Allergic/Immunologic: Negative  Neurological: Negative  Hematological: Negative  Psychiatric/Behavioral: Negative  All other systems reviewed and are negative  Objective:      /60 (BP Location: Left arm, Patient Position: Sitting, Cuff Size: Standard)   Pulse 83   Temp 97 6 °F (36 4 °C) (Tympanic)   Wt 81 8 kg (180 lb 6 4 oz)   BMI 36 44 kg/m²          Physical Exam   Constitutional: She is oriented to person, place, and time  Vital signs are normal  She appears well-developed and well-nourished  HENT:   Head: Normocephalic and atraumatic  Eyes: Conjunctivae are normal  Pupils are equal, round, and reactive to light  No scleral icterus  Neck: Normal range of motion  Cardiovascular: Normal rate, regular rhythm and normal heart sounds  Pulmonary/Chest: Effort normal and breath sounds normal  No respiratory distress  Abdominal: Soft  Normal appearance and bowel sounds are normal  She exhibits no distension, no ascites and no mass  There is no hepatosplenomegaly  There is no tenderness  No hernia  Musculoskeletal: Normal range of motion  Lymphadenopathy:     She has no cervical adenopathy  Neurological: She is alert and oriented to person, place, and time  Skin: Skin is warm  Psychiatric: She has a normal mood and affect   Her behavior is normal  Thought content normal

## 2018-09-20 DIAGNOSIS — K29.70 GASTRITIS WITHOUT BLEEDING, UNSPECIFIED CHRONICITY, UNSPECIFIED GASTRITIS TYPE: ICD-10-CM

## 2018-09-20 DIAGNOSIS — A04.8 H. PYLORI INFECTION: Primary | ICD-10-CM

## 2018-09-20 RX ORDER — CLARITHROMYCIN 500 MG/1
500 TABLET, COATED ORAL EVERY 12 HOURS SCHEDULED
Qty: 28 TABLET | Refills: 0 | Status: SHIPPED | OUTPATIENT
Start: 2018-09-20 | End: 2018-10-04

## 2018-09-20 RX ORDER — AMOXICILLIN 500 MG/1
1000 TABLET, FILM COATED ORAL 2 TIMES DAILY
Qty: 56 TABLET | Refills: 0 | Status: SHIPPED | OUTPATIENT
Start: 2018-09-20 | End: 2018-10-04

## 2018-09-20 RX ORDER — OMEPRAZOLE 40 MG/1
40 CAPSULE, DELAYED RELEASE ORAL 2 TIMES DAILY
Qty: 30 CAPSULE | Refills: 0 | Status: SHIPPED | OUTPATIENT
Start: 2018-09-20 | End: 2018-10-18

## 2018-09-24 ENCOUNTER — OFFICE VISIT (OUTPATIENT)
Dept: FAMILY MEDICINE CLINIC | Facility: CLINIC | Age: 48
End: 2018-09-24
Payer: COMMERCIAL

## 2018-09-24 VITALS
HEART RATE: 78 BPM | DIASTOLIC BLOOD PRESSURE: 70 MMHG | WEIGHT: 181 LBS | SYSTOLIC BLOOD PRESSURE: 110 MMHG | RESPIRATION RATE: 16 BRPM | OXYGEN SATURATION: 98 % | TEMPERATURE: 98.1 F | HEIGHT: 59 IN | BODY MASS INDEX: 36.49 KG/M2

## 2018-09-24 DIAGNOSIS — K80.20 GALLSTONES: ICD-10-CM

## 2018-09-24 DIAGNOSIS — R73.9 HYPERGLYCEMIA: ICD-10-CM

## 2018-09-24 DIAGNOSIS — G43.909 MIGRAINE WITHOUT STATUS MIGRAINOSUS, NOT INTRACTABLE, UNSPECIFIED MIGRAINE TYPE: Primary | ICD-10-CM

## 2018-09-24 DIAGNOSIS — E78.5 HYPERLIPIDEMIA, UNSPECIFIED HYPERLIPIDEMIA TYPE: ICD-10-CM

## 2018-09-24 PROBLEM — N60.19 FIBROCYSTIC DISEASE OF BREAST: Status: ACTIVE | Noted: 2017-06-28

## 2018-09-24 PROCEDURE — 99213 OFFICE O/P EST LOW 20 MIN: CPT | Performed by: FAMILY MEDICINE

## 2018-09-24 PROCEDURE — 3008F BODY MASS INDEX DOCD: CPT | Performed by: FAMILY MEDICINE

## 2018-09-24 RX ORDER — AMOXICILLIN 500 MG/1
CAPSULE ORAL
Refills: 0 | COMMUNITY
Start: 2018-09-20 | End: 2018-09-24 | Stop reason: SDUPTHER

## 2018-09-24 RX ORDER — TOPIRAMATE 25 MG/1
25 TABLET ORAL 2 TIMES DAILY
Qty: 60 TABLET | Refills: 5 | Status: SHIPPED | OUTPATIENT
Start: 2018-09-24 | End: 2019-11-07 | Stop reason: ALTCHOICE

## 2018-09-24 NOTE — PROGRESS NOTES
Assessment/Plan:  1  Migraine without status migrainosus, not intractable, unspecified migraine type  Current treatment, refills medication:  - topiramate (TOPAMAX) 25 mg tablet; Take 1 tablet (25 mg total) by mouth 2 (two) times a day  Dispense: 60 tablet; Refill: 5    2  Hyperglycemia  To rule out diabetes, ordering:  - Hemoglobin A1C; Future    3  Hyperlipidemia, unspecified hyperlipidemia type  Testing hyperlipidemia for her employer and insurance purposes:  - Lipid Panel with Direct LDL reflex; Future    4  Gallstones  Discussed about the need for cholecystectomy in elective way  The complication from cholecystectomy and acute settings can be more harmful than doing surgery electively  I am requesting a 2nd opinion with Jake Medley regarding calloused ectomy after she finished treatment for helicopter pylori gastritis  She is asymptomatic at this time  - Ambulatory referral to General Surgery; Future    No problem-specific Assessment & Plan notes found for this encounter  There are no diagnoses linked to this encounter  Subjective:      Patient ID: Radha Mart is a 50 y o  female  PT here to followup post ER  The following portions of the patient's history were reviewed and updated as appropriate: allergies, current medications, past family history, past medical history, past social history, past surgical history and problem list     Review of Systems   Constitutional: Positive for unexpected weight change  Negative for fatigue and fever  HENT: Negative for sore throat and trouble swallowing  Eyes: Negative for photophobia  Respiratory: Negative for cough, chest tightness, shortness of breath and wheezing  Cardiovascular: Negative for chest pain, palpitations and leg swelling  Gastrointestinal: Negative for abdominal pain, blood in stool, nausea and vomiting  Genitourinary: Negative for hematuria     Neurological: Negative for dizziness, syncope, light-headedness and headaches  Hematological: Does not bruise/bleed easily  Objective:      /70 (BP Location: Left arm, Patient Position: Sitting, Cuff Size: Standard)   Pulse 78   Temp 98 1 °F (36 7 °C) (Oral)   Resp 16   Ht 4' 10 5" (1 486 m)   Wt 82 1 kg (181 lb)   SpO2 98%   Breastfeeding? No   BMI 37 19 kg/m²          Physical Exam   Constitutional: She is oriented to person, place, and time  She appears well-developed and well-nourished  HENT:   Head: Normocephalic  Eyes: EOM are normal  Pupils are equal, round, and reactive to light  Neck: Neck supple  Cardiovascular: Normal rate, regular rhythm and normal heart sounds  Pulmonary/Chest: Effort normal and breath sounds normal    Abdominal: Soft  Bowel sounds are normal    Musculoskeletal: Normal range of motion  Neurological: She is alert and oriented to person, place, and time  She has normal reflexes  Skin: Skin is warm and dry  Psychiatric: She has a normal mood and affect   Her behavior is normal  Judgment and thought content normal

## 2018-09-24 NOTE — PATIENT INSTRUCTIONS
Cálculos biliares   LO QUE NECESITA SABER:   Los cálculos biliares, que también se conocen brown la colelitiasis, son sustancias duras que se melissa en alaniz vesícula biliar o conducto colédoco  Alaniz vesícula biliar y conducto colédoco se encuentran en la parte derecha del abdomen, cerca de alaniz hígado  Alaniz vesícula biliar almacena la bilis, la cual ayuda a descomponer la grasa que usted consume  Alaniz vesícula biliar también ayuda a eliminar ciertos químicos de alaniz cuerpo  INSTRUCCIONES SOBRE EL [facility]:   Medicamentos:   · Un medicamento con receta para el dolor  podrían ser [facility]  Pregunte al médico cómo debe mariam erica medicamento de forma millard  · [place] hari medicamentos brown se le haya indicado  Consulte con alaniz médico si usted leonora que alaniz medicamento no le está ayudando o si presenta efectos secundarios  Infórmele si es alérgico a cualquier medicamento  Mantenga suraj lista actualizada de los [place], las vitaminas y los productos herbales que ange  Incluya los siguientes datos de los medicamentos: cantidad, frecuencia y motivo de administración  Traiga con usted la lista o los envases de la píldoras a hari citas de seguimiento  Lleve la lista de los medicamentos con usted en wolfgang de suraj emergencia  Acuda a hari consultas de control con alaniz médico según le indicaron  Anote hari preguntas para que se acuerde de hacerlas lobito hari visitas  Consuma alimentos saludables y variados:  [place] podría ayudar a que usted tenga más energía y se recupere más pronto  Los alimentos saludables incluyen fruta, vegetales, panes integrales, productos lácteos bajo en grasa, frijoles, jessica sin grasa, y pescado  Pregunte si necesita seguir suraj dieta especial   Ejercítese según indicaciones:  Consulte con alaniz médico acerca de cuál es el mejor régimen de ejercicio para usted  El ejercicio puede ayudar a que usted baje de peso y [employer]     Pregúntele a alaniz Eduardo Flo vitaminas y minerales son adecuados para usted    · Usted tiene náuseas y vómitos  · Alaniz orina está oscura  · Usted tiene evacuaciones intestinales del color de arcilla  · Usted tiene preguntas o inquietudes acerca de alaniz condición o cuidado  Busque atención médica de inmediato o llame al 911 si:   · Usted tiene fiebre o escalofríos  · Alaniz piel o hari ojos se tornan amarillentos  · Usted tiene dolor intenso en la parte superior de alaniz abdomen, laura debajo de alaniz caja torácica  © 2017 [address] [employer] [employer] Information is for End User's use only and may not be sold, redistributed or otherwise used for commercial purposes  All illustrations and images included in CareNotes® are the copyrighted property of A D A M , Inc  or Efrain Holland  Esta información es sólo para uso en educación  Alaniz intención no es darle un consejo médico sobre enfermedades o tratamientos  Colsulte con alaniz Zelpha Roch farmacéutico antes de seguir cualquier régimen médico para saber si es seguro y efectivo para usted

## 2018-09-29 ENCOUNTER — APPOINTMENT (OUTPATIENT)
Dept: LAB | Facility: HOSPITAL | Age: 48
End: 2018-09-29
Payer: COMMERCIAL

## 2018-09-29 DIAGNOSIS — R73.9 HYPERGLYCEMIA: ICD-10-CM

## 2018-09-29 DIAGNOSIS — E78.5 HYPERLIPIDEMIA, UNSPECIFIED HYPERLIPIDEMIA TYPE: ICD-10-CM

## 2018-09-29 LAB
CHOLEST SERPL-MCNC: 179 MG/DL (ref 50–200)
EST. AVERAGE GLUCOSE BLD GHB EST-MCNC: 128 MG/DL
HBA1C MFR BLD: 6.1 % (ref 4.2–6.3)
HDLC SERPL-MCNC: 56 MG/DL (ref 40–60)
LDLC SERPL CALC-MCNC: 97 MG/DL (ref 0–100)
TRIGL SERPL-MCNC: 130 MG/DL

## 2018-09-29 PROCEDURE — 80061 LIPID PANEL: CPT

## 2018-09-29 PROCEDURE — 36415 COLL VENOUS BLD VENIPUNCTURE: CPT

## 2018-09-29 PROCEDURE — 83036 HEMOGLOBIN GLYCOSYLATED A1C: CPT

## 2018-10-18 ENCOUNTER — APPOINTMENT (OUTPATIENT)
Dept: PREADMISSION TESTING | Facility: HOSPITAL | Age: 48
End: 2018-10-18
Payer: COMMERCIAL

## 2018-10-18 DIAGNOSIS — K29.70 GASTRITIS WITHOUT BLEEDING, UNSPECIFIED CHRONICITY, UNSPECIFIED GASTRITIS TYPE: ICD-10-CM

## 2018-10-18 LAB
ALBUMIN SERPL BCP-MCNC: 4.3 G/DL (ref 3–5.2)
ALP SERPL-CCNC: 67 U/L (ref 43–122)
ALT SERPL W P-5'-P-CCNC: 38 U/L (ref 9–52)
ANION GAP SERPL CALCULATED.3IONS-SCNC: 9 MMOL/L (ref 5–14)
AST SERPL W P-5'-P-CCNC: 23 U/L (ref 14–36)
BILIRUB SERPL-MCNC: 0.3 MG/DL
BUN SERPL-MCNC: 15 MG/DL (ref 5–25)
CALCIUM SERPL-MCNC: 9.6 MG/DL (ref 8.4–10.2)
CHLORIDE SERPL-SCNC: 104 MMOL/L (ref 97–108)
CO2 SERPL-SCNC: 26 MMOL/L (ref 22–30)
CREAT SERPL-MCNC: 0.81 MG/DL (ref 0.6–1.2)
EOSINOPHIL # BLD AUTO: 0.47 THOUSAND/UL (ref 0–0.4)
EOSINOPHIL NFR BLD MANUAL: 6 % (ref 0–6)
ERYTHROCYTE [DISTWIDTH] IN BLOOD BY AUTOMATED COUNT: 13.5 %
GFR SERPL CREATININE-BSD FRML MDRD: 86 ML/MIN/1.73SQ M
GLUCOSE SERPL-MCNC: 84 MG/DL (ref 70–99)
HCT VFR BLD AUTO: 43.2 % (ref 36–46)
HGB BLD-MCNC: 14.2 G/DL (ref 12–16)
LYMPHOCYTES # BLD AUTO: 1.72 THOUSAND/UL (ref 0.5–4)
LYMPHOCYTES # BLD AUTO: 22 % (ref 20–50)
MCH RBC QN AUTO: 27.8 PG (ref 26–34)
MCHC RBC AUTO-ENTMCNC: 32.9 G/DL (ref 31–36)
MCV RBC AUTO: 84 FL (ref 80–100)
MONOCYTES # BLD AUTO: 0.47 THOUSAND/UL (ref 0.2–0.9)
MONOCYTES NFR BLD AUTO: 6 % (ref 1–10)
NEUTS SEG # BLD: 5.15 THOUSAND/UL (ref 1.8–7.8)
NEUTS SEG NFR BLD AUTO: 66 %
PLATELET # BLD AUTO: 297 THOUSANDS/UL (ref 150–450)
PLATELET BLD QL SMEAR: ADEQUATE
PMV BLD AUTO: 7.5 FL (ref 8.9–12.7)
POTASSIUM SERPL-SCNC: 3.9 MMOL/L (ref 3.6–5)
PROT SERPL-MCNC: 7.4 G/DL (ref 5.9–8.4)
RBC # BLD AUTO: 5.12 MILLION/UL (ref 4–5.2)
RBC MORPH BLD: NORMAL
SODIUM SERPL-SCNC: 139 MMOL/L (ref 137–147)
TOTAL CELLS COUNTED SPEC: 100
WBC # BLD AUTO: 7.8 THOUSAND/UL (ref 4.5–11)

## 2018-10-18 PROCEDURE — 80053 COMPREHEN METABOLIC PANEL: CPT

## 2018-10-18 PROCEDURE — 93005 ELECTROCARDIOGRAM TRACING: CPT

## 2018-10-18 PROCEDURE — 85027 COMPLETE CBC AUTOMATED: CPT

## 2018-10-18 PROCEDURE — 85007 BL SMEAR W/DIFF WBC COUNT: CPT

## 2018-10-18 RX ORDER — OMEPRAZOLE 40 MG/1
CAPSULE, DELAYED RELEASE ORAL
Qty: 30 CAPSULE | Refills: 0 | OUTPATIENT
Start: 2018-10-18

## 2018-10-18 NOTE — PRE-PROCEDURE INSTRUCTIONS
Pre-Surgery Instructions:   Medication Instructions    topiramate (TOPAMAX) 25 mg tablet Instructed patient per Anesthesia Guidelines  Pre-op Showering Instructions for Surgery Patients    Before your operation, you play an important role in decreasing your risk for infection by washing with special antiseptic soap  This is an effective way to reduce bacteria on the skin which may help to prevent infections at the surgical site  Please read the following directions in advance  1  In the week before your operation, purchase a 4 ounce bottle of antiseptic soap containing chlorhexidine gluconate (CHG)  4%  Some brand names include: Aplicare®, Endure, and Hibiclens®  The cost is usually less than $5 00   For your convenience, the [employer] carries the soap   It may also be available at your doctors office or pre-admission testing center, and at most retail pharmacies   If you are allergic or sensitive to soaps containing CHG, please let your doctor know so another antiseptic can be suggested   CHG antiseptic soap is for external use only  2  The day before your operation, follow these instructions carefully to get ready   Please clean linens (sheets) on your bed; you should sleep on clean sheets after your evening shower   Get clean towels and washcloth ready - you need enough for 2 showers   Set aside clean underwear, pajamas, and clothing to wear after the showers     Reminders:   DO NOT use any other soap or body rinse on your skin during or after the antiseptic showers   DO NOT use lotion, powder, deodorant, or perfume/aftershave of any kind on your skin after your antiseptic shower   DO NOT shave any body parts in the 24 hours/day before your operation   DO NOT get the antiseptic soap in your eyes, ears, nose, mouth, or vaginal area    3   You will need to shower the night before AND the morning of your surgery  Shower 1:   The first evening before the operation, take the first shower   First, shampoo your hair with regular shampoo and rinse it completely before you use the antiseptic soap  After washing and rinsing your hair, rinse your body   Next, use a clean washcloth to apply the antiseptic soap and wash your body from the neck down to your toes using ½ bottle of the antiseptic soap   You will use the other ½ bottle for the second shower   Clean the area where your incision will be; lather this area well for about 2 minutes   If you are having head or neck surgery, wash areas with the antiseptic soap   Rinse yourself completely with running water   Use a clean towel to dry off   Wear clean underwear and clothing/pajamas  Shower 2   The morning of your operation, take the second shower following the same steps as Shower 1 using the second ½ of the bottle of antiseptic soap   Use clean cloths and towels to wash and dry yourself   Wear clean underwear and clothing

## 2018-10-24 LAB
ATRIAL RATE: 59 BPM
P AXIS: 50 DEGREES
PR INTERVAL: 136 MS
QRS AXIS: 69 DEGREES
QRSD INTERVAL: 82 MS
QT INTERVAL: 432 MS
QTC INTERVAL: 427 MS
T WAVE AXIS: 36 DEGREES
VENTRICULAR RATE: 59 BPM

## 2018-10-26 DIAGNOSIS — K29.70 GASTRITIS WITHOUT BLEEDING, UNSPECIFIED CHRONICITY, UNSPECIFIED GASTRITIS TYPE: ICD-10-CM

## 2018-10-26 RX ORDER — OMEPRAZOLE 40 MG/1
CAPSULE, DELAYED RELEASE ORAL
Qty: 30 CAPSULE | Refills: 0 | OUTPATIENT
Start: 2018-10-26

## 2018-10-29 ENCOUNTER — ANESTHESIA EVENT (OUTPATIENT)
Dept: PERIOP | Facility: HOSPITAL | Age: 48
End: 2018-10-29
Payer: COMMERCIAL

## 2018-10-29 NOTE — ANESTHESIA PREPROCEDURE EVALUATION
Review of Systems/Medical History  Patient summary reviewed  Chart reviewed      Cardiovascular  EKG reviewed, Exercise tolerance (METS): >4,     Pulmonary  Asthma , well controlled/ stable ,        GI/Hepatic    GERD well controlled,        Kidney stones, Single kidney,   Comment: S/P Cysto      Endo/Other  Negative endo/other ROS      GYN    Hysterectomy,        Hematology  Negative hematology ROS      Musculoskeletal  Negative musculoskeletal ROS        Neurology    Headaches (migraiane),    Psychology   Negative psychology ROS          Comprehensive metabolic panel   Order: [id number]   Status:  Final result   Visible to patient:  Yes (MyChart) Next appt:  03/21/2019 at 08:00 AM in Family Medicine (Cuba Howard MD) Dx:  Preop testing     Ref Range & Units 10/18/18 1322 4/11/18 1523    Sodium 137 - 147 mmol/L 139  140R     Potassium 3 6 - 5 0 mmol/L 3 9  4 0R     Chloride 97 - 108 mmol/L 104  109R      CO2 22 - 30 mmol/L 26  24R     ANION GAP 5 - 14 mmol/L 9  7R     BUN 5 - 25 mg/dL 15  13     Creatinine 0 60 - 1 20 mg/dL 0 81  0 86R, CM    Comment: Standardized to IDMS reference method    Glucose 70 - 99 mg/dL 84  88R, CM    Comment:    If the patient is fasting, the ADA then defines impaired fasting glucose as > 100 mg/dL and diabetes as > or equal to 123 mg/dL  Specimen collection should occur prior to Sulfasalazine administration due to the potential for falsely depressed results  Specimen collection should occur prior to Sulfapyridine administration due to the potential for falsely elevated results  Calcium 8 4 - 10 2 mg/dL 9 6  9 0R     AST 14 - 36 U/L 23  11R, CM    Comment:    Specimen collection should occur prior to Sulfasalazine administration due to the potential for falsely depressed results  ALT 9 - 52 U/L 38  22R, CM    Comment:    Specimen collection should occur prior to Sulfasalazine administration due to the potential for falsely depressed results       Alkaline Phosphatase 43 - 122 U/L 67  68R     Total Protein 5 9 - 8 4 g/dL 7 4  7 9R     Albumin 3 0 - 5 2 g/dL 4 3  3 8R     Total Bilirubin <1 30 mg/dL 0 30  0 43R     eGFR >60 ml/min/1 73sq m 86  81R    Narrative       National Kidney Disease Education Program recommendations are as follows:  GFR calculation is accurate only with a steady state creatinine  Chronic Kidney disease less than 60 ml/min/1 73 sq  meters  Kidney failure less than 15 ml/min/1 73 sq  meters  Specimen Collected: 10/18/18 13:22 Last Resulted: 10/18/18 14:13              CM=Additional comments  R=Reference range differs from displayed range              CBC and differential   Order: [id number]   Status:  Final result   Visible to patient:  Yes (MyChart) Next appt:  03/21/2019 at 08:00 AM in Family Medicine Bubba Witt MD) Dx:  Preop testing     Ref Range & Units 10/18/18 1322 4/11/18 1523 3/29/18 1825    WBC 4 50 - 11 00 Thousand/uL 7 80  8  02R  7 5R     RBC 4 00 - 5 20 Million/uL 5 12  5  13R   5 25R      Hemoglobin 12 0 - 16 0 g/dL 14 2  14 4R  14 3R     Hematocrit 36 0 - 46 0 % 43 2  42 5R  44 2     MCV 80 - 100 fL 84  83R  84R     MCH 26 0 - 34 0 pg 27 8  28 1R  27 3R     MCHC 31 0 - 36 0 g/dL 32 9  33 9R  32 4R     RDW <15 3 % 13 5  13 1R  <15 3 %" class="[id number]">  13 9     MPV 8 9 - 12 7 fL 7 5   8 7       Platelets 630 - 900 Thousands/uL 297  242R  237R     Neutrophils Relative   59  67      Monocytes Absolute   0 60  0 5     Basophils Absolute   0 04  0 2      RBC Morphology    See NoteCM     COMMENT    See NoteCM     nRBC   0      Lymphocytes Absolute   1 87      BANDS    5     Lymphocytes Relative   23  12      Eosinophils Relative   9   7      Monocytes Relative   8  7     Basophils Relative   1  2      NEUTROPHILS ABS COUNT   4 81  5 4     ABSOL LYMPHOCYTES    0 9     Eosinophils Absolute   0 68   0 5        Specimen Collected: 10/18/18 13:22 Last Resulted: 10/18/18 14:27              CM=Additional comments  R=Reference range differs from displayed range                    Physical Exam    Airway    Mallampati score: II  TM Distance: >3 FB  Neck ROM: full     Dental       Cardiovascular  Cardiovascular exam normal    Pulmonary  Pulmonary exam normal     Other Findings        Anesthesia Plan  ASA Score- 2     Anesthesia Type- general with ASA Monitors  Additional Monitors:   Airway Plan: ETT  Plan Factors-    Induction- intravenous  Postoperative Plan-     Informed Consent- Anesthetic plan and risks discussed with patient  I personally reviewed this patient with the CRNA  Discussed and agreed on the Anesthesia Plan with the CRNA  Anitha Mackenzie

## 2018-10-30 ENCOUNTER — ANESTHESIA (OUTPATIENT)
Dept: PERIOP | Facility: HOSPITAL | Age: 48
End: 2018-10-30
Payer: COMMERCIAL

## 2018-10-30 ENCOUNTER — HOSPITAL ENCOUNTER (OUTPATIENT)
Facility: HOSPITAL | Age: 48
Setting detail: OUTPATIENT SURGERY
Discharge: HOME | End: 2018-10-30
Attending: SURGERY | Admitting: SURGERY
Payer: COMMERCIAL

## 2018-10-30 VITALS
HEART RATE: 63 BPM | SYSTOLIC BLOOD PRESSURE: 104 MMHG | HEIGHT: 59 IN | TEMPERATURE: 96.4 F | RESPIRATION RATE: 15 BRPM | OXYGEN SATURATION: 90 % | WEIGHT: 179 LBS | BODY MASS INDEX: 36.08 KG/M2 | DIASTOLIC BLOOD PRESSURE: 64 MMHG

## 2018-10-30 DIAGNOSIS — K81.1 CHRONIC CHOLECYSTITIS: Primary | ICD-10-CM

## 2018-10-30 DIAGNOSIS — Z01.818 PREOP TESTING: ICD-10-CM

## 2018-10-30 PROBLEM — K80.10 CHOLECYSTITIS WITH CHOLELITHIASIS: Status: ACTIVE | Noted: 2018-04-17

## 2018-10-30 PROCEDURE — 88304 TISSUE EXAM BY PATHOLOGIST: CPT | Performed by: PATHOLOGY

## 2018-10-30 RX ORDER — GLYCOPYRROLATE 0.2 MG/ML
INJECTION INTRAMUSCULAR; INTRAVENOUS AS NEEDED
Status: DISCONTINUED | OUTPATIENT
Start: 2018-10-30 | End: 2018-10-30 | Stop reason: SURG

## 2018-10-30 RX ORDER — LIDOCAINE HYDROCHLORIDE 10 MG/ML
INJECTION, SOLUTION INFILTRATION; PERINEURAL AS NEEDED
Status: DISCONTINUED | OUTPATIENT
Start: 2018-10-30 | End: 2018-10-30 | Stop reason: SURG

## 2018-10-30 RX ORDER — MAGNESIUM HYDROXIDE 1200 MG/15ML
LIQUID ORAL AS NEEDED
Status: DISCONTINUED | OUTPATIENT
Start: 2018-10-30 | End: 2018-10-30 | Stop reason: HOSPADM

## 2018-10-30 RX ORDER — ROCURONIUM BROMIDE 10 MG/ML
INJECTION, SOLUTION INTRAVENOUS AS NEEDED
Status: DISCONTINUED | OUTPATIENT
Start: 2018-10-30 | End: 2018-10-30 | Stop reason: SURG

## 2018-10-30 RX ORDER — FENTANYL CITRATE 50 UG/ML
INJECTION, SOLUTION INTRAMUSCULAR; INTRAVENOUS AS NEEDED
Status: DISCONTINUED | OUTPATIENT
Start: 2018-10-30 | End: 2018-10-30 | Stop reason: SURG

## 2018-10-30 RX ORDER — MIDAZOLAM HYDROCHLORIDE 1 MG/ML
INJECTION INTRAMUSCULAR; INTRAVENOUS AS NEEDED
Status: DISCONTINUED | OUTPATIENT
Start: 2018-10-30 | End: 2018-10-30 | Stop reason: SURG

## 2018-10-30 RX ORDER — FENTANYL CITRATE/PF 50 MCG/ML
25 SYRINGE (ML) INJECTION
Status: DISCONTINUED | OUTPATIENT
Start: 2018-10-30 | End: 2018-10-30 | Stop reason: HOSPADM

## 2018-10-30 RX ORDER — BUPIVACAINE HYDROCHLORIDE AND EPINEPHRINE 5; 5 MG/ML; UG/ML
INJECTION, SOLUTION PERINEURAL AS NEEDED
Status: DISCONTINUED | OUTPATIENT
Start: 2018-10-30 | End: 2018-10-30 | Stop reason: HOSPADM

## 2018-10-30 RX ORDER — HYDROMORPHONE HCL/PF 1 MG/ML
0.5 SYRINGE (ML) INJECTION
Status: DISCONTINUED | OUTPATIENT
Start: 2018-10-30 | End: 2018-10-30 | Stop reason: HOSPADM

## 2018-10-30 RX ORDER — ONDANSETRON 2 MG/ML
4 INJECTION INTRAMUSCULAR; INTRAVENOUS EVERY 6 HOURS PRN
Status: DISCONTINUED | OUTPATIENT
Start: 2018-10-30 | End: 2018-10-30 | Stop reason: HOSPADM

## 2018-10-30 RX ORDER — ONDANSETRON 2 MG/ML
INJECTION INTRAMUSCULAR; INTRAVENOUS
Status: DISCONTINUED
Start: 2018-10-30 | End: 2018-10-30 | Stop reason: HOSPADM

## 2018-10-30 RX ORDER — DEXAMETHASONE SODIUM PHOSPHATE 4 MG/ML
INJECTION, SOLUTION INTRA-ARTICULAR; INTRALESIONAL; INTRAMUSCULAR; INTRAVENOUS; SOFT TISSUE AS NEEDED
Status: DISCONTINUED | OUTPATIENT
Start: 2018-10-30 | End: 2018-10-30 | Stop reason: SURG

## 2018-10-30 RX ORDER — ONDANSETRON 2 MG/ML
INJECTION INTRAMUSCULAR; INTRAVENOUS AS NEEDED
Status: DISCONTINUED | OUTPATIENT
Start: 2018-10-30 | End: 2018-10-30 | Stop reason: SURG

## 2018-10-30 RX ORDER — OXYCODONE HYDROCHLORIDE AND ACETAMINOPHEN 5; 325 MG/1; MG/1
1 TABLET ORAL EVERY 4 HOURS PRN
Status: DISCONTINUED | OUTPATIENT
Start: 2018-10-30 | End: 2018-10-30 | Stop reason: HOSPADM

## 2018-10-30 RX ORDER — PROPOFOL 10 MG/ML
INJECTION, EMULSION INTRAVENOUS AS NEEDED
Status: DISCONTINUED | OUTPATIENT
Start: 2018-10-30 | End: 2018-10-30 | Stop reason: SURG

## 2018-10-30 RX ORDER — OXYCODONE HYDROCHLORIDE AND ACETAMINOPHEN 5; 325 MG/1; MG/1
1 TABLET ORAL EVERY 4 HOURS PRN
Qty: 30 TABLET | Refills: 0 | Status: SHIPPED | OUTPATIENT
Start: 2018-10-30 | End: 2018-11-09

## 2018-10-30 RX ORDER — SODIUM CHLORIDE, SODIUM LACTATE, POTASSIUM CHLORIDE, CALCIUM CHLORIDE 600; 310; 30; 20 MG/100ML; MG/100ML; MG/100ML; MG/100ML
125 INJECTION, SOLUTION INTRAVENOUS CONTINUOUS
Status: DISCONTINUED | OUTPATIENT
Start: 2018-10-30 | End: 2018-10-30 | Stop reason: HOSPADM

## 2018-10-30 RX ADMIN — FENTANYL CITRATE 25 MCG: 50 INJECTION INTRAMUSCULAR; INTRAVENOUS at 12:09

## 2018-10-30 RX ADMIN — FENTANYL CITRATE 100 MCG: 50 INJECTION INTRAMUSCULAR; INTRAVENOUS at 09:55

## 2018-10-30 RX ADMIN — MIDAZOLAM HYDROCHLORIDE 2 MG: 1 INJECTION, SOLUTION INTRAMUSCULAR; INTRAVENOUS at 09:55

## 2018-10-30 RX ADMIN — CEFAZOLIN SODIUM 2000 MG: 2 SOLUTION INTRAVENOUS at 10:07

## 2018-10-30 RX ADMIN — ROCURONIUM BROMIDE 40 MG: 10 INJECTION INTRAVENOUS at 09:55

## 2018-10-30 RX ADMIN — HYDROMORPHONE HYDROCHLORIDE 0.5 MG: 1 INJECTION, SOLUTION INTRAMUSCULAR; INTRAVENOUS; SUBCUTANEOUS at 12:18

## 2018-10-30 RX ADMIN — FENTANYL CITRATE 25 MCG: 50 INJECTION INTRAMUSCULAR; INTRAVENOUS at 12:04

## 2018-10-30 RX ADMIN — FENTANYL CITRATE 25 MCG: 50 INJECTION INTRAMUSCULAR; INTRAVENOUS at 12:14

## 2018-10-30 RX ADMIN — ONDANSETRON HYDROCHLORIDE 4 MG: 2 INJECTION, SOLUTION INTRAMUSCULAR; INTRAVENOUS at 10:46

## 2018-10-30 RX ADMIN — SODIUM CHLORIDE, POTASSIUM CHLORIDE, SODIUM LACTATE AND CALCIUM CHLORIDE 125 ML/HR: 600; 310; 30; 20 INJECTION, SOLUTION INTRAVENOUS at 08:59

## 2018-10-30 RX ADMIN — DEXAMETHASONE SODIUM PHOSPHATE 8 MG: 4 INJECTION, SOLUTION INTRA-ARTICULAR; INTRALESIONAL; INTRAMUSCULAR; INTRAVENOUS; SOFT TISSUE at 10:16

## 2018-10-30 RX ADMIN — SODIUM CHLORIDE, POTASSIUM CHLORIDE, SODIUM LACTATE AND CALCIUM CHLORIDE: 600; 310; 30; 20 INJECTION, SOLUTION INTRAVENOUS at 10:34

## 2018-10-30 RX ADMIN — PROPOFOL 150 MG: 10 INJECTION, EMULSION INTRAVENOUS at 09:55

## 2018-10-30 RX ADMIN — PROPOFOL 50 MG: 10 INJECTION, EMULSION INTRAVENOUS at 11:01

## 2018-10-30 RX ADMIN — LIDOCAINE HYDROCHLORIDE 50 MG: 10 INJECTION, SOLUTION INFILTRATION; PERINEURAL at 09:55

## 2018-10-30 RX ADMIN — NEOSTIGMINE METHYLSULFATE 3 MG: 1 INJECTION, SOLUTION INTRAMUSCULAR; INTRAVENOUS; SUBCUTANEOUS at 11:00

## 2018-10-30 RX ADMIN — FENTANYL CITRATE 100 MCG: 50 INJECTION INTRAMUSCULAR; INTRAVENOUS at 10:14

## 2018-10-30 RX ADMIN — GLYCOPYRROLATE 0.4 MG: 0.2 INJECTION, SOLUTION INTRAMUSCULAR; INTRAVENOUS at 11:00

## 2018-10-30 RX ADMIN — ONDANSETRON HYDROCHLORIDE 4 MG: 2 INJECTION, SOLUTION INTRAMUSCULAR; INTRAVENOUS at 18:46

## 2018-10-30 RX ADMIN — FENTANYL CITRATE 25 MCG: 50 INJECTION INTRAMUSCULAR; INTRAVENOUS at 11:56

## 2018-10-30 NOTE — NURSING NOTE
Patient feeling better after the Zofran given  Patient ambulated in hallway again and wants to go home  IV dc'd and patient changing into clothing  Discharge instructions given to both patient and family  All verbalized understanding

## 2018-10-30 NOTE — NURSING NOTE
Patient vomited again , Dr Fish Justice called for orders  IV Zofran 4mg given as ordered   Will monitor patient

## 2018-10-30 NOTE — DISCHARGE INSTRUCTIONS
No lifting, no driving, may shower  Remove bandage on Thursday    See Dr Jenn Stout in 1 week  Call [phone] time  Take 1 Percocet every 4 hr for pain

## 2018-10-30 NOTE — NURSING NOTE
Patient vomited small amount of green emesis, feels better  Will continue to monitor and if no better will give her iv medication per anesthesia

## 2018-10-30 NOTE — OP NOTE
OPERATIVE REPORT  PATIENT NAME: Yaritza Riggs    :  1970  MRN: [id number]  Pt Location:  OR ROOM 10    SURGERY DATE: 10/30/2018    Surgeon(s) and Role:     David Cam DO - Primary    Preop Diagnosis:  Chronic cholecystitis [K81 1]    Post-Op Diagnosis Codes:     * Chronic cholecystitis [K81 1]    Procedure(s) (LRB):  LAPAROSCOPIC CHOLECYSTECTOMY (N/A)    Specimen(s):  ID Type Source Tests Collected by Time Destination   1 : gallbladder Tissue [facility]bladder TISSUE EXAM [facility],  10/30/2018 1022        Estimated Blood Loss:   Minimal    Drains:  Ureteral Drain/Stent Right ureter 6 Fr  (Active)   Number of days: 256       Anesthesia Type:   Choice    Operative Indications:  Chronic cholecystitis [K81 1]  Cholelithiasis    Operative Findings:  Distended gallbladder filled with stones    Complications:   None    Procedure and Technique: With the patient in Trendelenburg position and infraumbilical incision was made  Dissection was carried down through the subcutaneous tissue and a small incision was made in the fascia  The Veress needle was inserted and a pneumoperitoneum was obtained  An 11 mm port was placed with the use of visi port and a 2nd 11 mm port was placed in the subxiphoid position  Two 5 mm ports were placed in the right-sided patient's abdomen  The gallbladder is found to be distended  Omentum was dissected off the gallbladder using electrocautery  The gallbladder isn't placed on tension and the cystic artery and cystic duct very carefully dissected out in the triangle of Calot  Each structure was doubly clipped and cut  The gallbladder was then removed from the gallbladder fossa in a retrograde fashion from the triangle of colo up to the fundus using electrocautery to provide hemostasis and also to seal off bile canaliculi  The gallbladder was then removed with the use of an Endo-Catch bag through the subxiphoid incision    It was saved and sent to pathology lab for tissue diagnosis  The subxiphoid incision was closed with an Endo close device  The pneumoperitoneum was evacuated hemostasis was adequate  The skin of all 4 incisions then closed with interrupted vertical mattress sutures and skin staples  The patient tolerated the procedure well and she was taken to PACU in stable condition     I was present for the entire procedure    Patient Disposition:  PACU  and hemodynamically stable    SIGNATURE: Kevin Woodall DO  DATE: October 30, 2018  TIME: 10:59 AM

## 2018-10-30 NOTE — ANESTHESIA POSTPROCEDURE EVALUATION
Post-Op Assessment Note      CV Status:  Stable    Mental Status:  Alert    Hydration Status:  Stable    PONV Controlled:  Controlled    Airway Patency:  Patent    Post Op Vitals Reviewed: Yes          Staff: CRNA           BP   127/71   Temp 98 1 °F (36 7 °C) (10/30/18 1116)    Pulse  77   Resp 20 (10/30/18 1116)    SpO2 96 % (10/30/18 1116)

## 2018-10-30 NOTE — UTILIZATION REVIEW
Received patient from PACU awake and alert  VSS  Pain level 5/10, resting comfortably at this time  Family at bedside

## 2019-01-01 NOTE — PROGRESS NOTES
I have reviewed the notes, assessments, and/or procedures performed by STEVEN, I concur with her/his documentation of Martita Younger  [facility]: [phone]

## 2019-02-07 DIAGNOSIS — N95.1 MENOPAUSAL SYMPTOM: Primary | ICD-10-CM

## 2019-02-07 DIAGNOSIS — G43.909 MIGRAINE WITHOUT STATUS MIGRAINOSUS, NOT INTRACTABLE, UNSPECIFIED MIGRAINE TYPE: ICD-10-CM

## 2019-02-07 NOTE — TELEPHONE ENCOUNTER
Please send 1 month supply of estradiol, pt is in search of a new gyn doctor and cannot be without her me

## 2019-02-08 RX ORDER — ESTRADIOL 2 MG/1
2 TABLET ORAL DAILY
Qty: 30 TABLET | Refills: 5 | Status: SHIPPED | OUTPATIENT
Start: 2019-02-08 | End: 2020-02-25

## 2019-03-18 ENCOUNTER — OFFICE VISIT (OUTPATIENT)
Dept: FAMILY MEDICINE CLINIC | Facility: CLINIC | Age: 49
End: 2019-03-18
Payer: COMMERCIAL

## 2019-03-18 VITALS
OXYGEN SATURATION: 97 % | BODY MASS INDEX: 36.69 KG/M2 | HEIGHT: 59 IN | TEMPERATURE: 99.9 F | SYSTOLIC BLOOD PRESSURE: 120 MMHG | WEIGHT: 182 LBS | HEART RATE: 92 BPM | DIASTOLIC BLOOD PRESSURE: 70 MMHG | RESPIRATION RATE: 16 BRPM

## 2019-03-18 DIAGNOSIS — R35.0 URINARY FREQUENCY: Primary | ICD-10-CM

## 2019-03-18 LAB
SL AMB  POCT GLUCOSE, UA: ABNORMAL
SL AMB LEUKOCYTE ESTERASE,UA: ABNORMAL
SL AMB POCT BILIRUBIN,UA: ABNORMAL
SL AMB POCT BLOOD,UA: ABNORMAL
SL AMB POCT CLARITY,UA: CLEAR
SL AMB POCT COLOR,UA: YELLOW
SL AMB POCT KETONES,UA: ABNORMAL
SL AMB POCT NITRITE,UA: ABNORMAL
SL AMB POCT PH,UA: 6
SL AMB POCT SPECIFIC GRAVITY,UA: 1.01
SL AMB POCT URINE PROTEIN: ABNORMAL
SL AMB POCT UROBILINOGEN: 0.2

## 2019-03-18 PROCEDURE — 81002 URINALYSIS NONAUTO W/O SCOPE: CPT | Performed by: FAMILY MEDICINE

## 2019-03-18 PROCEDURE — 99213 OFFICE O/P EST LOW 20 MIN: CPT | Performed by: FAMILY MEDICINE

## 2019-03-18 PROCEDURE — 81001 URINALYSIS AUTO W/SCOPE: CPT | Performed by: FAMILY MEDICINE

## 2019-03-18 RX ORDER — AMOXICILLIN 500 MG/1
500 CAPSULE ORAL EVERY 8 HOURS SCHEDULED
Qty: 30 CAPSULE | Refills: 0 | Status: SHIPPED | OUTPATIENT
Start: 2019-03-18 | End: 2019-03-29 | Stop reason: ALTCHOICE

## 2019-03-18 NOTE — PATIENT INSTRUCTIONS
Por favor shay con detenimiento  Muy Importante!!!    · Alaniz peso  será revisado  Es posible que [employer] midan alaniz altura para calcular alaniz índice de masa corporal [facility])  El [facility] indica si tiene un peso saludable  · Se verificarán alaniz presión arterial  y frecuencia cardíaca  También pueden revisar alaniz temperatura  · Exámenes de [place] y [facility]  se podría realizar  Se podrían realizar exámenes de rudi para revisar los niveles de [facility]  Los niveles anormales de colesterol aumentan el riesgo de enfermedad del corazón y accidente cerebrovascular  Puede que también necesite suraj prueba de rudi u orina para revisar si tiene diabetes si usted está en mayor riesgo  Las pruebas de orina pueden hacerse para buscar signos de suraj infección o suraj enfermedad renal      · Un examen físico  incluye la comprobación de hari latidos del corazón y los pulmones con un estetoscopio  Alaniz médico también podría revisarle la piel para buscar daños causados por el sol  · Pruebas de detección  podría recomendarse  Se realiza un examen de detección para detectar enfermedades que pueden no causar síntomas  Los exámenes de detección que necesite dependen de alaniz edad, género, antecedentes familiares y hábitos de corby  Por ejemplo, podrían recomendarle la exploración selectiva colorrectal si tiene 48 años o más  ¿Qué exámenes de detección necesito si soy suraj shilo? · El examen de Papanicolaou  se utiliza para detectar cáncer de lexus uterino  El examen del Papanicolaou por lo general se realiza entre 3 a 5 años dependiendo de alaniz edad  Puede necesitarlo más a menudo si usted ha tenido [employer] de la prueba de Papanicolaou en el pasado  Pregunte a alaniz médico con qué frecuencia debería realizarse un examen de Papanicolaou  · Suraj mamografía  es suraj radiografía de hari senos para detectar cáncer de mama  Los expertos recomiendan [address] cada 2 años empezando a los 48 años de [place]   Es probable que usted necesite suraj mamografía a los 52 años o antes si tiene riesgo alto de cáncer de seno  Hable con alaniz médico sobre cuándo debe empezar con hari mamografías y con cuánta frecuencia las necesita  ¿Qué vacunas podría necesitar? · Debe recibir Tacey Osmany vacuna contra la influenza  todos los [place]  La vacuna contra la influenza protege de la gripe  Varios tipos de virus causan la influenza  Debido a que los virus [place] con el [place], es necesaria la producción de nuevas vacunas cada año  · Debe recibir Tacey Osmany vacuna de refuerzo contra el tétanos-difteria (Td)  cada 10 años  Esta vacuna protege contra el tétanos y la difteria  El tétanos es suraj infección severa que puede causar trismo y espasmos musculares dolorosos  La difteria es suraj infección bacterial grave que produce suraj cubierta gruesa en la parte de atrás de la boca y garganta  · Debe recibir la vacuna contra el virus del papiloma humano (VPH)  si usted es shilo y [place] 19 y 32 años o es hombre y [place] 23 y 24 años y nunca la recibió  Esta vacuna protege contra la infección por VPH  El virus del papiloma humano o VPH es la infección más común que se transmite por contacto sexual  El virus del papiloma humano también podría provocar cáncer vaginal, del pene y del ano  · Debe recibir la vacuna antineumocócica  si tiene más de 72 años  La vacuna antineumocócica es suraj inyección que se administra para protegerlo contra suraj enfermedad neumocócica  La enfermedad neumocócica es suraj infección causada por la bacteria neumocócica  La infección puede causar neumonía, meningitis o suraj infección del oído  · Debe recibir la vacuna contra la culebrilla  si tiene [address] [place] [address],[address] [place] [address] o [place], incluso si eaton tenido culebrilla antes  La vacuna contra la culebrilla (herpes zóster) es suraj inyección usada para proteger contra el virus zóster que causa la varicela  [place] es el mismo virus que causa la varicela   La culebrilla es un sarpullido doloroso que se desarrolla en personas que tuvieron varicela o eaton estado expuestas al virus  ¿Cómo puedo comer de manera saludable? Mi [place] es un modelo para planear comidas sanas  Muestra los tipos y cantidades de alimentos que deberían ir en un plato  Monique Lambert y verduras representan alrededor de la mitad de alaniz plato y los granos y proteínas representan la otra mitad  Se incluye suraj porción de productos lácteos al lado del plato  La cantidad de calorías y [address] de las porciones que usted necesita dependen de alaniz edad, [place], peso y altura  Los ejemplos de alimentos saludables son:  · Consuma suraj variedad de verduras  brown las de color anthony oscuro, solitario y [place]  Usted también puede incluir verduras enlatadas bajas en sodio (sal) y verduras congeladas sin mantequilla ni salsas [id number]  · Consuma suraj variedad de fruta frescas , las frutas enlatadas en 100% de jugo , fruta [place] y ana secos  · Incluya granos enteros  Por lo menos la mitad de los granos que usted consume deben ser granos de leah integral  Por ejemplo, panes de grano entero, pasta integral, arroz integral y cereales de grano entero brown la willy  · Consuma suraj variedad de alimentos con proteínas brown mariscos (pescado y crustáceos), Bates Larger y carne de ave sin piel (pavo y conrad)  Ejemplos de jessica magras incluyen pierna, paleta o lomo de puerco y lakia, solomillo o, lomo de res y carne [place] de res extra [place]  Otros alimentos ricos en proteínas son los huevos y sustitutos de [place], frijoles, chícharos, productos de soya, nueces y semillas  · Elija productos lácteos bajos en grasa [employer] o del 1% o yogur, queso y requesón bajos en grasa  · Limite las grasas poco saludables,  brown la [place], la margarina dura y la [place]  ¿Qué cantidad de actividad física necesito? Realice suraj actividad física por lo menos 30 minutos al día, la mayoría de los días de la [place]   Algunos de los ejercicios incluyen caminar, montar en bicicleta, bailar y nadar  Usted también puede realizar más actividad física usando las escaleras en vez de los ascensores o estacionarse más lejos cuando Oral Nan a las tiendas  Incluya ejercicios para fortalecer los músculos 2 días a la semana  El ejercicio regular proporciona muchos beneficios para la gabo  Lidia Pae a controlar alaniz peso y [employer] riesgo de diabetes tipo 2, presión arterial seema, enfermedad del corazón y accidente cerebrovascular  El ejercicio [employer] puede ayudar a mejorar alaniz estado de ánimo  Pregunte a alaniz médico acerca del mejor plan de ejercicio para usted  ¿Cuáles son [facility] generales de gabo y seguridad que thais seguir? · No fume  La nicotina y otras sustancias químicas que contienen los cigarrillos y cigarros pueden dañar los pulmones  Pida información a alaniz médico si usted actualmente fuma y necesita ayuda para dejar de fumar  Los cigarrillos electrónicos o tabaco sin humo todavía contienen nicotina  Consulte con alaniz médico antes de [employer]  · Limite el consumo de alcohol  Un trago equivale a 12 onzas de cerveza, 5 onzas de vino o 1 onza y ½ de licor  · Baje de peso, si es necesario  El sobrepeso aumenta el riesgo de ciertas condiciones de [place]  Estos incluyen enfermedad del corazón, presión arterial seema, diabetes tipo 2 y ciertos tipos de cáncer  · Proteja alaniz piel  No tome el sol ni use bryon de bronceado  Use protector solar con un SPF 13 o mayor  Aplíquese el bloqueador por lo menos 15 minutos antes de que vaya a estar al [employer]  Vuelva a aplicarse la crema solar cada 2 horas  Use ropa protectora, sombrero y lentes para el sol cuando se encuentre afuera  · Conduzca con seguridad  Use siempre alaniz cinturón de seguridad  Asegúrese que todos en el stefan usan el cinturón de seguridad  Un cinturón de seguridad puede salvar alaniz corby en wolfgang de un accidente automovilístico  No use el celular cuando esté manejando   [place] puede hacer que se distraiga y causar un accidente  Es mejor que pare y se orille si necesita hacer suraj Agnieszka Jaspal un texto  · Practique el sexo seguro  Use condones de látex si es sexualmente [place] y tiene más de [place] and [place]  [place] médico puede recomendar exámenes de detección de infecciones de transmisión sexual (ITS)  · Use un josé miguel, un chaleco salvavidas y unos implementos de protección  Siempre use un josé miguel al [employer] en bicicleta o motocicleta, esquiar o jugar deportes que podrían causar suraj lesión en la polo  Use implementos de protección cuando practique deportes  Use un chaleco salvavidas cuando esté en un bote o practicando actividades acuáticas

## 2019-03-18 NOTE — PROGRESS NOTES
Assessment/Plan:  1  Urinary frequency  patient's symptoms and urine dip in office are suggestive of urinary tract infection, unspecified site  Requesting urine culture and starting empirically broad-spectrum antibiotic, follow her up after  urine culture is reported  - POCT urine dip  - amoxicillin (AMOXIL) 500 mg capsule; Take 1 capsule (500 mg total) by mouth every 8 (eight) hours for 10 days  Dispense: 30 capsule; Refill: 0  - UA w Reflex to Microscopic w Reflex to Culture  - Urine Microscopic    No problem-specific Assessment & Plan notes found for this encounter  Diagnoses and all orders for this visit:    Urinary frequency  -     POCT urine dip  -     amoxicillin (AMOXIL) 500 mg capsule; Take 1 capsule (500 mg total) by mouth every 8 (eight) hours for 10 days          Subjective:      Patient ID: Villa Zee is a 50 y o  female  HPI  Difficulty Urinating    This is a new problem problem: dysuria, urgency, frequency and hesitancyThe current episode started in the past 3 days  The dysuria frequent The problem has been unchanged  The quality of the pain is described as aching and burning  The pain is at a severity of 4/10  There has been no fever  Patient  is sexually active  There is no history of pyelonephritis  Pertinent negatives include no none pertinent Patient has tried nothing for the urinary symptoms  There is no history of kidney stones, recurrent UTIs or a urological procedure  The following portions of the patient's history were reviewed and updated as appropriate: allergies, current medications, past family history, past medical history, past social history, past surgical history and problem list     Review of Systems   Constitutional: Negative for fatigue and fever  HENT: Positive for sneezing, sore throat and voice change  Negative for congestion, ear pain, hearing loss, mouth sores, postnasal drip, rhinorrhea, sinus pain, tinnitus and trouble swallowing      Eyes: Negative for discharge and itching  Respiratory: Negative for cough, chest tightness, shortness of breath and wheezing  Gastrointestinal: Negative for abdominal pain  Endocrine: Negative for cold intolerance and heat intolerance  Genitourinary: Positive for decreased urine volume, dysuria, flank pain, frequency and pelvic pain  Negative for difficulty urinating  Musculoskeletal: Positive for arthralgias  Neurological: Positive for dizziness and weakness  Negative for seizures, syncope, light-headedness and numbness  Psychiatric/Behavioral: Negative for agitation, behavioral problems, confusion and decreased concentration  Objective:      /70 (BP Location: Left arm, Patient Position: Sitting, Cuff Size: Standard)   Pulse 92   Temp 99 9 °F (37 7 °C) (Oral)   Resp 16   Ht 4' 10 5" (1 486 m)   Wt 82 6 kg (182 lb)   SpO2 97%   Breastfeeding? No   BMI 37 39 kg/m²          Physical Exam   HENT:   Head: Normocephalic  Eyes: Pupils are equal, round, and reactive to light  EOM are normal    Neck: Neck supple  Cardiovascular: Normal rate and regular rhythm  Pulmonary/Chest: Effort normal    Abdominal: Soft  She exhibits no mass  There is tenderness  There is no rebound and no guarding  No hernia  Musculoskeletal: Normal range of motion  Neurological: She is alert  Skin: Skin is warm and dry  Psychiatric: She has a normal mood and affect   Her behavior is normal

## 2019-03-19 LAB
BACTERIA UR QL AUTO: ABNORMAL /HPF
BILIRUB UR QL STRIP: NEGATIVE
CLARITY UR: ABNORMAL
COARSE GRAN CASTS URNS QL MICRO: ABNORMAL /LPF
COLOR UR: YELLOW
GLUCOSE UR STRIP-MCNC: NEGATIVE MG/DL
HGB UR QL STRIP.AUTO: NEGATIVE
KETONES UR STRIP-MCNC: NEGATIVE MG/DL
LEUKOCYTE ESTERASE UR QL STRIP: ABNORMAL
NITRITE UR QL STRIP: NEGATIVE
NON-SQ EPI CELLS URNS QL MICRO: ABNORMAL /HPF
PH UR STRIP.AUTO: 6 [PH]
PROT UR STRIP-MCNC: NEGATIVE MG/DL
RBC #/AREA URNS AUTO: ABNORMAL /HPF
SP GR UR STRIP.AUTO: 1.02 (ref 1–1.03)
UROBILINOGEN UR QL STRIP.AUTO: 0.2 E.U./DL
WBC #/AREA URNS AUTO: ABNORMAL /HPF

## 2019-03-29 ENCOUNTER — OFFICE VISIT (OUTPATIENT)
Dept: FAMILY MEDICINE CLINIC | Facility: CLINIC | Age: 49
End: 2019-03-29
Payer: COMMERCIAL

## 2019-03-29 VITALS
HEART RATE: 73 BPM | RESPIRATION RATE: 16 BRPM | DIASTOLIC BLOOD PRESSURE: 70 MMHG | TEMPERATURE: 98 F | SYSTOLIC BLOOD PRESSURE: 110 MMHG | HEIGHT: 58 IN | OXYGEN SATURATION: 96 % | WEIGHT: 180 LBS | BODY MASS INDEX: 37.79 KG/M2

## 2019-03-29 DIAGNOSIS — E66.09 CLASS 2 OBESITY DUE TO EXCESS CALORIES WITHOUT SERIOUS COMORBIDITY WITH BODY MASS INDEX (BMI) OF 37.0 TO 37.9 IN ADULT: ICD-10-CM

## 2019-03-29 DIAGNOSIS — Z00.01 ENCOUNTER FOR GENERAL ADULT MEDICAL EXAMINATION WITH ABNORMAL FINDINGS: Primary | ICD-10-CM

## 2019-03-29 DIAGNOSIS — Z23 NEED FOR TDAP VACCINATION: ICD-10-CM

## 2019-03-29 DIAGNOSIS — Z78.0 MENOPAUSE: ICD-10-CM

## 2019-03-29 PROCEDURE — 99396 PREV VISIT EST AGE 40-64: CPT | Performed by: FAMILY MEDICINE

## 2019-03-29 PROCEDURE — 90471 IMMUNIZATION ADMIN: CPT | Performed by: FAMILY MEDICINE

## 2019-03-29 PROCEDURE — 90715 TDAP VACCINE 7 YRS/> IM: CPT | Performed by: FAMILY MEDICINE

## 2019-05-09 ENCOUNTER — TRANSCRIBE ORDERS (OUTPATIENT)
Dept: LAB | Facility: CLINIC | Age: 49
End: 2019-05-09

## 2019-05-09 ENCOUNTER — APPOINTMENT (OUTPATIENT)
Dept: LAB | Facility: CLINIC | Age: 49
End: 2019-05-09
Payer: COMMERCIAL

## 2019-05-09 DIAGNOSIS — Z78.0 MENOPAUSE: Primary | ICD-10-CM

## 2019-05-09 DIAGNOSIS — Z00.01 ENCOUNTER FOR GENERAL ADULT MEDICAL EXAMINATION WITH ABNORMAL FINDINGS: ICD-10-CM

## 2019-05-09 DIAGNOSIS — Z78.0 MENOPAUSE: ICD-10-CM

## 2019-05-09 DIAGNOSIS — E66.09 CLASS 2 OBESITY DUE TO EXCESS CALORIES WITHOUT SERIOUS COMORBIDITY WITH BODY MASS INDEX (BMI) OF 37.0 TO 37.9 IN ADULT: ICD-10-CM

## 2019-05-09 LAB
25(OH)D3 SERPL-MCNC: 19.7 NG/ML (ref 30–100)
ALBUMIN SERPL BCP-MCNC: 3.7 G/DL (ref 3.5–5)
ALP SERPL-CCNC: 68 U/L (ref 46–116)
ALT SERPL W P-5'-P-CCNC: 24 U/L (ref 12–78)
ANION GAP SERPL CALCULATED.3IONS-SCNC: 9 MMOL/L (ref 4–13)
AST SERPL W P-5'-P-CCNC: 13 U/L (ref 5–45)
BASOPHILS # BLD AUTO: 0.05 THOUSANDS/ΜL (ref 0–0.1)
BASOPHILS NFR BLD AUTO: 1 % (ref 0–1)
BILIRUB SERPL-MCNC: 0.3 MG/DL (ref 0.2–1)
BUN SERPL-MCNC: 19 MG/DL (ref 5–25)
CALCIUM SERPL-MCNC: 9.1 MG/DL (ref 8.3–10.1)
CHLORIDE SERPL-SCNC: 108 MMOL/L (ref 100–108)
CO2 SERPL-SCNC: 25 MMOL/L (ref 21–32)
CREAT SERPL-MCNC: 1.01 MG/DL (ref 0.6–1.3)
EOSINOPHIL # BLD AUTO: 0.7 THOUSAND/ΜL (ref 0–0.61)
EOSINOPHIL NFR BLD AUTO: 10 % (ref 0–6)
ERYTHROCYTE [DISTWIDTH] IN BLOOD BY AUTOMATED COUNT: 13.1 % (ref 11.6–15.1)
GFR SERPL CREATININE-BSD FRML MDRD: 66 ML/MIN/1.73SQ M
GLUCOSE P FAST SERPL-MCNC: 85 MG/DL (ref 65–99)
HCT VFR BLD AUTO: 43.9 % (ref 34.8–46.1)
HGB BLD-MCNC: 14.1 G/DL (ref 11.5–15.4)
IMM GRANULOCYTES # BLD AUTO: 0.01 THOUSAND/UL (ref 0–0.2)
IMM GRANULOCYTES NFR BLD AUTO: 0 % (ref 0–2)
LYMPHOCYTES # BLD AUTO: 1.65 THOUSANDS/ΜL (ref 0.6–4.47)
LYMPHOCYTES NFR BLD AUTO: 22 % (ref 14–44)
MCH RBC QN AUTO: 27.3 PG (ref 26.8–34.3)
MCHC RBC AUTO-ENTMCNC: 32.1 G/DL (ref 31.4–37.4)
MCV RBC AUTO: 85 FL (ref 82–98)
MONOCYTES # BLD AUTO: 0.52 THOUSAND/ΜL (ref 0.17–1.22)
MONOCYTES NFR BLD AUTO: 7 % (ref 4–12)
NEUTROPHILS # BLD AUTO: 4.43 THOUSANDS/ΜL (ref 1.85–7.62)
NEUTS SEG NFR BLD AUTO: 60 % (ref 43–75)
NRBC BLD AUTO-RTO: 0 /100 WBCS
PLATELET # BLD AUTO: 256 THOUSANDS/UL (ref 149–390)
PMV BLD AUTO: 9.2 FL (ref 8.9–12.7)
POTASSIUM SERPL-SCNC: 4 MMOL/L (ref 3.5–5.3)
PROT SERPL-MCNC: 7.6 G/DL (ref 6.4–8.2)
RBC # BLD AUTO: 5.17 MILLION/UL (ref 3.81–5.12)
SODIUM SERPL-SCNC: 142 MMOL/L (ref 136–145)
TSH SERPL DL<=0.05 MIU/L-ACNC: 2.17 UIU/ML (ref 0.36–3.74)
WBC # BLD AUTO: 7.36 THOUSAND/UL (ref 4.31–10.16)

## 2019-05-09 PROCEDURE — 36415 COLL VENOUS BLD VENIPUNCTURE: CPT

## 2019-05-09 PROCEDURE — 85025 COMPLETE CBC W/AUTO DIFF WBC: CPT

## 2019-05-09 PROCEDURE — 84443 ASSAY THYROID STIM HORMONE: CPT

## 2019-05-09 PROCEDURE — 82306 VITAMIN D 25 HYDROXY: CPT

## 2019-05-09 PROCEDURE — 80053 COMPREHEN METABOLIC PANEL: CPT

## 2019-10-14 ENCOUNTER — OFFICE VISIT (OUTPATIENT)
Dept: URGENT CARE | Facility: CLINIC | Age: 49
End: 2019-10-14

## 2019-10-14 VITALS
BODY MASS INDEX: 35.39 KG/M2 | WEIGHT: 168.6 LBS | DIASTOLIC BLOOD PRESSURE: 84 MMHG | SYSTOLIC BLOOD PRESSURE: 128 MMHG | HEIGHT: 58 IN

## 2019-10-14 DIAGNOSIS — Z13.6 SCREENING FOR CARDIOVASCULAR CONDITION: Primary | ICD-10-CM

## 2019-10-14 DIAGNOSIS — Z01.89 ENCOUNTER FOR TOBACCO USE SCREENING: Primary | ICD-10-CM

## 2019-10-14 PROCEDURE — G0480 DRUG TEST DEF 1-7 CLASSES: HCPCS | Performed by: NURSE PRACTITIONER

## 2019-10-14 PROCEDURE — 80323 ALKALOIDS NOS: CPT | Performed by: NURSE PRACTITIONER

## 2019-10-14 NOTE — PROGRESS NOTES
Biometric screening only       /84   Ht 4' 10" (1 473 m)   Wt 76 5 kg (168 lb 9 6 oz)   BMI 35 24 kg/m²   Waist: 38 75 inch

## 2019-10-17 LAB
COTININE SERPL-MCNC: NORMAL NG/ML
NICOTINE SERPL-MCNC: NORMAL NG/ML

## 2019-11-07 ENCOUNTER — OFFICE VISIT (OUTPATIENT)
Dept: FAMILY MEDICINE CLINIC | Facility: CLINIC | Age: 49
End: 2019-11-07
Payer: COMMERCIAL

## 2019-11-07 VITALS
BODY MASS INDEX: 34.43 KG/M2 | SYSTOLIC BLOOD PRESSURE: 110 MMHG | WEIGHT: 164 LBS | RESPIRATION RATE: 16 BRPM | HEART RATE: 72 BPM | HEIGHT: 58 IN | DIASTOLIC BLOOD PRESSURE: 70 MMHG | OXYGEN SATURATION: 98 % | TEMPERATURE: 98 F

## 2019-11-07 DIAGNOSIS — R73.03 PREDIABETES: ICD-10-CM

## 2019-11-07 DIAGNOSIS — R63.5 WEIGHT GAIN: Primary | ICD-10-CM

## 2019-11-07 PROCEDURE — 99214 OFFICE O/P EST MOD 30 MIN: CPT | Performed by: FAMILY MEDICINE

## 2019-11-07 NOTE — PROGRESS NOTES
Assessment/Plan:    Weight gain  I advised her to continue same, Hydration and exercise continue being very important  Diet are hard to keep  We do not recommend it, but decrease size in the portions  Prediabetes  Continue life style modification  Diagnoses and all orders for this visit:    Weight gain    Prediabetes    Other orders  -     Cancel: Comprehensive metabolic panel; Future  -     Cancel: HEMOGLOBIN A1C W/ EAG ESTIMATION; Future  -     Cancel: Lipid panel; Future  -     Cancel: Comprehensive metabolic panel; Future  -     Cancel: Hemoglobin A1C; Future  -     Cancel: Lipid panel; Future          Subjective:      Patient ID: Agustina Garcia is a 52 y o  female  Weight gain: Agustina Garcia, 52 y o  who reports gaining weight progressively  She does not exercise in a regular basis or uses eating healthy habits; She gained 20 lbs in the past 1 year;  Denies serious comorbidity; She snores but never had a sleep study in the past   also complains also of pain in knees and ankle when standing for a long period of time  Weight is more prominent in her abdominal area  Patient denies having eating disorder  She denies hypothyroidism, never tested for adrenal disorder  She has able to control weight with a "special diet that consists in cut all sugars"      The following portions of the patient's history were reviewed and updated as appropriate: allergies, current medications, past family history, past medical history, past social history, past surgical history and problem list     Review of Systems   Constitutional: Negative for diaphoresis, fatigue, fever and unexpected weight change  Respiratory: Negative for cough, chest tightness, shortness of breath and wheezing  Cardiovascular: Negative for chest pain, palpitations and leg swelling  Gastrointestinal: Negative for abdominal pain, blood in stool, nausea and vomiting     Neurological: Negative for dizziness, syncope, light-headedness and headaches  Hematological: Does not bruise/bleed easily  Psychiatric/Behavioral: Negative for behavioral problems, self-injury and sleep disturbance  The patient is not nervous/anxious  Objective:      /70 (BP Location: Left arm, Patient Position: Sitting, Cuff Size: Standard)   Pulse 72   Temp 98 °F (36 7 °C) (Oral)   Resp 16   Ht 4' 10" (1 473 m)   Wt 74 4 kg (164 lb)   SpO2 98%   Breastfeeding? No   BMI 34 28 kg/m²          Physical Exam   HENT:   Nose: Nose normal    Mouth/Throat: Oropharynx is clear and moist  No oropharyngeal exudate  Eyes: Pupils are equal, round, and reactive to light  EOM are normal  Right eye exhibits no discharge  Left eye exhibits no discharge  No scleral icterus  Cardiovascular: Normal rate, regular rhythm, normal heart sounds and intact distal pulses  Exam reveals no friction rub  No murmur heard  Pulmonary/Chest: Effort normal  No respiratory distress  She has no wheezes  She has no rales  She exhibits no tenderness  Musculoskeletal: Normal range of motion  Neurological: She is alert  Skin: Skin is warm and dry  No rash noted  No erythema  Psychiatric: She has a normal mood and affect  Her behavior is normal    Nursing note and vitals reviewed

## 2019-11-09 ENCOUNTER — TRANSCRIBE ORDERS (OUTPATIENT)
Dept: LAB | Facility: CLINIC | Age: 49
End: 2019-11-09

## 2019-11-09 ENCOUNTER — LAB (OUTPATIENT)
Dept: LAB | Facility: CLINIC | Age: 49
End: 2019-11-09
Payer: COMMERCIAL

## 2019-11-09 DIAGNOSIS — Z00.01 ENCOUNTER FOR GENERAL ADULT MEDICAL EXAMINATION WITH ABNORMAL FINDINGS: Primary | ICD-10-CM

## 2019-11-09 DIAGNOSIS — Z00.01 ENCOUNTER FOR GENERAL ADULT MEDICAL EXAMINATION WITH ABNORMAL FINDINGS: ICD-10-CM

## 2019-11-09 LAB
ALBUMIN SERPL BCP-MCNC: 3.6 G/DL (ref 3.5–5)
ALP SERPL-CCNC: 69 U/L (ref 46–116)
ALT SERPL W P-5'-P-CCNC: 35 U/L (ref 12–78)
ANION GAP SERPL CALCULATED.3IONS-SCNC: 8 MMOL/L (ref 4–13)
AST SERPL W P-5'-P-CCNC: 17 U/L (ref 5–45)
BILIRUB SERPL-MCNC: 0.3 MG/DL (ref 0.2–1)
BUN SERPL-MCNC: 13 MG/DL (ref 5–25)
CALCIUM SERPL-MCNC: 8.7 MG/DL (ref 8.3–10.1)
CHLORIDE SERPL-SCNC: 107 MMOL/L (ref 100–108)
CHOLEST SERPL-MCNC: 183 MG/DL (ref 50–200)
CO2 SERPL-SCNC: 27 MMOL/L (ref 21–32)
CREAT SERPL-MCNC: 0.9 MG/DL (ref 0.6–1.3)
EST. AVERAGE GLUCOSE BLD GHB EST-MCNC: 114 MG/DL
GFR SERPL CREATININE-BSD FRML MDRD: 75 ML/MIN/1.73SQ M
GLUCOSE P FAST SERPL-MCNC: 96 MG/DL (ref 65–99)
HBA1C MFR BLD: 5.6 % (ref 4.2–6.3)
HDLC SERPL-MCNC: 49 MG/DL
LDLC SERPL CALC-MCNC: 121 MG/DL (ref 0–100)
NONHDLC SERPL-MCNC: 134 MG/DL
POTASSIUM SERPL-SCNC: 4.3 MMOL/L (ref 3.5–5.3)
PROT SERPL-MCNC: 7.3 G/DL (ref 6.4–8.2)
SODIUM SERPL-SCNC: 142 MMOL/L (ref 136–145)
TRIGL SERPL-MCNC: 66 MG/DL

## 2019-11-09 PROCEDURE — 36415 COLL VENOUS BLD VENIPUNCTURE: CPT

## 2019-11-09 PROCEDURE — 83036 HEMOGLOBIN GLYCOSYLATED A1C: CPT

## 2019-11-09 PROCEDURE — 80061 LIPID PANEL: CPT

## 2019-11-09 PROCEDURE — 80053 COMPREHEN METABOLIC PANEL: CPT

## 2019-11-11 PROBLEM — R73.03 PREDIABETES: Status: ACTIVE | Noted: 2019-11-11

## 2019-11-11 PROBLEM — R63.5 WEIGHT GAIN: Status: ACTIVE | Noted: 2019-11-11

## 2019-11-11 NOTE — ASSESSMENT & PLAN NOTE
I advised her to continue same, Hydration and exercise continue being very important  Diet are hard to keep  We do not recommend it, but decrease size in the portions

## 2020-02-23 DIAGNOSIS — N95.1 MENOPAUSAL SYMPTOM: ICD-10-CM

## 2020-02-25 RX ORDER — ESTRADIOL 2 MG/1
TABLET ORAL
Qty: 90 TABLET | Refills: 2 | Status: SHIPPED | OUTPATIENT
Start: 2020-02-25 | End: 2020-12-18

## 2020-04-07 ENCOUNTER — TELEMEDICINE (OUTPATIENT)
Dept: FAMILY MEDICINE CLINIC | Facility: CLINIC | Age: 50
End: 2020-04-07
Payer: COMMERCIAL

## 2020-04-07 DIAGNOSIS — R52 GENERALIZED BODY ACHES: ICD-10-CM

## 2020-04-07 DIAGNOSIS — Z20.828 SARS-ASSOCIATED CORONAVIRUS EXPOSURE: Primary | ICD-10-CM

## 2020-04-07 DIAGNOSIS — R05.9 COUGH: ICD-10-CM

## 2020-04-07 PROCEDURE — 99214 OFFICE O/P EST MOD 30 MIN: CPT | Performed by: NURSE PRACTITIONER

## 2020-04-07 RX ORDER — DEXTROMETHORPHAN HYDROBROMIDE AND PROMETHAZINE HYDROCHLORIDE 15; 6.25 MG/5ML; MG/5ML
5 SOLUTION ORAL 4 TIMES DAILY PRN
Qty: 118 ML | Refills: 0 | Status: SHIPPED | OUTPATIENT
Start: 2020-04-07 | End: 2020-04-23 | Stop reason: ALTCHOICE

## 2020-04-07 RX ORDER — SENNOSIDES 8.6 MG
650 CAPSULE ORAL EVERY 8 HOURS PRN
Qty: 30 TABLET | Refills: 0 | Status: SHIPPED | OUTPATIENT
Start: 2020-04-07 | End: 2020-04-23 | Stop reason: ALTCHOICE

## 2020-04-11 ENCOUNTER — HOSPITAL ENCOUNTER (OUTPATIENT)
Facility: HOSPITAL | Age: 50
Setting detail: OBSERVATION
Discharge: HOME | End: 2020-04-12
Attending: EMERGENCY MEDICINE | Admitting: INTERNAL MEDICINE
Payer: COMMERCIAL

## 2020-04-11 ENCOUNTER — APPOINTMENT (EMERGENCY)
Dept: RADIOLOGY | Facility: HOSPITAL | Age: 50
End: 2020-04-11
Payer: COMMERCIAL

## 2020-04-11 DIAGNOSIS — U07.1 COVID-19: Primary | ICD-10-CM

## 2020-04-11 PROBLEM — J18.9 PNEUMONIA: Status: ACTIVE | Noted: 2020-04-11

## 2020-04-11 LAB
ALBUMIN SERPL BCP-MCNC: 3.2 G/DL (ref 3.5–5)
ALP SERPL-CCNC: 59 U/L (ref 46–116)
ALT SERPL W P-5'-P-CCNC: 28 U/L (ref 12–78)
AMORPH URATE CRY URNS QL MICRO: ABNORMAL /HPF
ANION GAP SERPL CALCULATED.3IONS-SCNC: 11 MMOL/L (ref 4–13)
ANION GAP SERPL CALCULATED.3IONS-SCNC: 9 MMOL/L (ref 4–13)
AST SERPL W P-5'-P-CCNC: 19 U/L (ref 5–45)
ATRIAL RATE: 66 BPM
ATRIAL RATE: 78 BPM
BACTERIA UR QL AUTO: ABNORMAL /HPF
BASOPHILS # BLD AUTO: 0.01 THOUSANDS/ΜL (ref 0–0.1)
BASOPHILS # BLD AUTO: 0.01 THOUSANDS/ΜL (ref 0–0.1)
BASOPHILS NFR BLD AUTO: 0 % (ref 0–1)
BASOPHILS NFR BLD AUTO: 0 % (ref 0–1)
BILIRUB SERPL-MCNC: 0.16 MG/DL (ref 0.2–1)
BILIRUB UR QL STRIP: NEGATIVE
BUN SERPL-MCNC: 11 MG/DL (ref 5–25)
BUN SERPL-MCNC: 13 MG/DL (ref 5–25)
CALCIUM SERPL-MCNC: 8.3 MG/DL (ref 8.3–10.1)
CALCIUM SERPL-MCNC: 8.3 MG/DL (ref 8.3–10.1)
CHLORIDE SERPL-SCNC: 101 MMOL/L (ref 100–108)
CHLORIDE SERPL-SCNC: 104 MMOL/L (ref 100–108)
CK SERPL-CCNC: 37 U/L (ref 26–192)
CLARITY UR: CLEAR
CO2 SERPL-SCNC: 24 MMOL/L (ref 21–32)
CO2 SERPL-SCNC: 27 MMOL/L (ref 21–32)
COLOR UR: ABNORMAL
CREAT SERPL-MCNC: 0.93 MG/DL (ref 0.6–1.3)
CREAT SERPL-MCNC: 0.96 MG/DL (ref 0.6–1.3)
CRP SERPL QL: 6.2 MG/L
D DIMER PPP FEU-MCNC: 0.95 UG/ML FEU
EOSINOPHIL # BLD AUTO: 0.13 THOUSAND/ΜL (ref 0–0.61)
EOSINOPHIL # BLD AUTO: 0.13 THOUSAND/ΜL (ref 0–0.61)
EOSINOPHIL NFR BLD AUTO: 3 % (ref 0–6)
EOSINOPHIL NFR BLD AUTO: 4 % (ref 0–6)
ERYTHROCYTE [DISTWIDTH] IN BLOOD BY AUTOMATED COUNT: 12.8 % (ref 11.6–15.1)
ERYTHROCYTE [DISTWIDTH] IN BLOOD BY AUTOMATED COUNT: 12.8 % (ref 11.6–15.1)
EXT PREG TEST URINE: NORMAL
EXT. CONTROL ED NAV: NORMAL
FERRITIN SERPL-MCNC: 154 NG/ML (ref 8–388)
FIBRINOGEN PPP-MCNC: 491 MG/DL (ref 227–495)
GFR SERPL CREATININE-BSD FRML MDRD: 70 ML/MIN/1.73SQ M
GFR SERPL CREATININE-BSD FRML MDRD: 72 ML/MIN/1.73SQ M
GLUCOSE SERPL-MCNC: 101 MG/DL (ref 65–140)
GLUCOSE SERPL-MCNC: 89 MG/DL (ref 65–140)
GLUCOSE UR STRIP-MCNC: NEGATIVE MG/DL
HCT VFR BLD AUTO: 43.1 % (ref 34.8–46.1)
HCT VFR BLD AUTO: 44.2 % (ref 34.8–46.1)
HGB BLD-MCNC: 14 G/DL (ref 11.5–15.4)
HGB BLD-MCNC: 14.8 G/DL (ref 11.5–15.4)
HGB UR QL STRIP.AUTO: NEGATIVE
IMM GRANULOCYTES # BLD AUTO: 0.01 THOUSAND/UL (ref 0–0.2)
IMM GRANULOCYTES # BLD AUTO: 0.01 THOUSAND/UL (ref 0–0.2)
IMM GRANULOCYTES NFR BLD AUTO: 0 % (ref 0–2)
IMM GRANULOCYTES NFR BLD AUTO: 0 % (ref 0–2)
KETONES UR STRIP-MCNC: NEGATIVE MG/DL
LDH SERPL-CCNC: 192 U/L (ref 81–234)
LEUKOCYTE ESTERASE UR QL STRIP: ABNORMAL
LYMPHOCYTES # BLD AUTO: 1.2 THOUSANDS/ΜL (ref 0.6–4.47)
LYMPHOCYTES # BLD AUTO: 1.25 THOUSANDS/ΜL (ref 0.6–4.47)
LYMPHOCYTES NFR BLD AUTO: 26 % (ref 14–44)
LYMPHOCYTES NFR BLD AUTO: 34 % (ref 14–44)
MAGNESIUM SERPL-MCNC: 1.8 MG/DL (ref 1.6–2.6)
MCH RBC QN AUTO: 27.2 PG (ref 26.8–34.3)
MCH RBC QN AUTO: 27.6 PG (ref 26.8–34.3)
MCHC RBC AUTO-ENTMCNC: 32.5 G/DL (ref 31.4–37.4)
MCHC RBC AUTO-ENTMCNC: 33.5 G/DL (ref 31.4–37.4)
MCV RBC AUTO: 82 FL (ref 82–98)
MCV RBC AUTO: 84 FL (ref 82–98)
MONOCYTES # BLD AUTO: 0.36 THOUSAND/ΜL (ref 0.17–1.22)
MONOCYTES # BLD AUTO: 0.39 THOUSAND/ΜL (ref 0.17–1.22)
MONOCYTES NFR BLD AUTO: 10 % (ref 4–12)
MONOCYTES NFR BLD AUTO: 9 % (ref 4–12)
NEUTROPHILS # BLD AUTO: 1.88 THOUSANDS/ΜL (ref 1.85–7.62)
NEUTROPHILS # BLD AUTO: 2.82 THOUSANDS/ΜL (ref 1.85–7.62)
NEUTS SEG NFR BLD AUTO: 52 % (ref 43–75)
NEUTS SEG NFR BLD AUTO: 62 % (ref 43–75)
NITRITE UR QL STRIP: NEGATIVE
NON-SQ EPI CELLS URNS QL MICRO: ABNORMAL /HPF
NRBC BLD AUTO-RTO: 0 /100 WBCS
NRBC BLD AUTO-RTO: 0 /100 WBCS
NT-PROBNP SERPL-MCNC: 21 PG/ML
P AXIS: 49 DEGREES
P AXIS: 52 DEGREES
PH UR STRIP.AUTO: 6 [PH]
PLATELET # BLD AUTO: 166 THOUSANDS/UL (ref 149–390)
PLATELET # BLD AUTO: 208 THOUSANDS/UL (ref 149–390)
PMV BLD AUTO: 9.3 FL (ref 8.9–12.7)
PMV BLD AUTO: 9.9 FL (ref 8.9–12.7)
POTASSIUM SERPL-SCNC: 3.6 MMOL/L (ref 3.5–5.3)
POTASSIUM SERPL-SCNC: 3.6 MMOL/L (ref 3.5–5.3)
PR INTERVAL: 132 MS
PR INTERVAL: 134 MS
PROCALCITONIN SERPL-MCNC: <0.05 NG/ML
PROT SERPL-MCNC: 7.1 G/DL (ref 6.4–8.2)
PROT UR STRIP-MCNC: NEGATIVE MG/DL
QRS AXIS: 74 DEGREES
QRS AXIS: 80 DEGREES
QRSD INTERVAL: 76 MS
QRSD INTERVAL: 80 MS
QT INTERVAL: 370 MS
QT INTERVAL: 372 MS
QTC INTERVAL: 387 MS
QTC INTERVAL: 424 MS
RBC # BLD AUTO: 5.14 MILLION/UL (ref 3.81–5.12)
RBC # BLD AUTO: 5.37 MILLION/UL (ref 3.81–5.12)
RBC #/AREA URNS AUTO: ABNORMAL /HPF
SARS-COV-2 RNA RESP QL NAA+PROBE: POSITIVE
SODIUM SERPL-SCNC: 136 MMOL/L (ref 136–145)
SODIUM SERPL-SCNC: 140 MMOL/L (ref 136–145)
SP GR UR STRIP.AUTO: <=1.005 (ref 1–1.03)
T WAVE AXIS: 36 DEGREES
T WAVE AXIS: 56 DEGREES
TRIGL SERPL-MCNC: 72 MG/DL
TROPONIN I SERPL-MCNC: <0.02 NG/ML
UROBILINOGEN UR QL STRIP.AUTO: 0.2 E.U./DL
VENTRICULAR RATE: 66 BPM
VENTRICULAR RATE: 78 BPM
WBC # BLD AUTO: 3.64 THOUSAND/UL (ref 4.31–10.16)
WBC # BLD AUTO: 4.56 THOUSAND/UL (ref 4.31–10.16)
WBC #/AREA URNS AUTO: ABNORMAL /HPF

## 2020-04-11 PROCEDURE — 83880 ASSAY OF NATRIURETIC PEPTIDE: CPT | Performed by: EMERGENCY MEDICINE

## 2020-04-11 PROCEDURE — 82728 ASSAY OF FERRITIN: CPT | Performed by: EMERGENCY MEDICINE

## 2020-04-11 PROCEDURE — 36415 COLL VENOUS BLD VENIPUNCTURE: CPT | Performed by: EMERGENCY MEDICINE

## 2020-04-11 PROCEDURE — 96365 THER/PROPH/DIAG IV INF INIT: CPT

## 2020-04-11 PROCEDURE — 86140 C-REACTIVE PROTEIN: CPT | Performed by: EMERGENCY MEDICINE

## 2020-04-11 PROCEDURE — 85384 FIBRINOGEN ACTIVITY: CPT | Performed by: EMERGENCY MEDICINE

## 2020-04-11 PROCEDURE — 84484 ASSAY OF TROPONIN QUANT: CPT | Performed by: EMERGENCY MEDICINE

## 2020-04-11 PROCEDURE — 81025 URINE PREGNANCY TEST: CPT | Performed by: EMERGENCY MEDICINE

## 2020-04-11 PROCEDURE — 71045 X-RAY EXAM CHEST 1 VIEW: CPT

## 2020-04-11 PROCEDURE — 80053 COMPREHEN METABOLIC PANEL: CPT | Performed by: PHYSICIAN ASSISTANT

## 2020-04-11 PROCEDURE — 99220 PR INITIAL OBSERVATION CARE/DAY 70 MINUTES: CPT | Performed by: INTERNAL MEDICINE

## 2020-04-11 PROCEDURE — 84145 PROCALCITONIN (PCT): CPT | Performed by: PHYSICIAN ASSISTANT

## 2020-04-11 PROCEDURE — 85025 COMPLETE CBC W/AUTO DIFF WBC: CPT | Performed by: PHYSICIAN ASSISTANT

## 2020-04-11 PROCEDURE — 82955 ASSAY OF G6PD ENZYME: CPT | Performed by: PHYSICIAN ASSISTANT

## 2020-04-11 PROCEDURE — 82550 ASSAY OF CK (CPK): CPT | Performed by: PHYSICIAN ASSISTANT

## 2020-04-11 PROCEDURE — 84478 ASSAY OF TRIGLYCERIDES: CPT | Performed by: PHYSICIAN ASSISTANT

## 2020-04-11 PROCEDURE — 99285 EMERGENCY DEPT VISIT HI MDM: CPT

## 2020-04-11 PROCEDURE — 93010 ELECTROCARDIOGRAM REPORT: CPT | Performed by: INTERNAL MEDICINE

## 2020-04-11 PROCEDURE — 87635 SARS-COV-2 COVID-19 AMP PRB: CPT | Performed by: EMERGENCY MEDICINE

## 2020-04-11 PROCEDURE — 85379 FIBRIN DEGRADATION QUANT: CPT | Performed by: EMERGENCY MEDICINE

## 2020-04-11 PROCEDURE — 83615 LACTATE (LD) (LDH) ENZYME: CPT | Performed by: EMERGENCY MEDICINE

## 2020-04-11 PROCEDURE — 81001 URINALYSIS AUTO W/SCOPE: CPT | Performed by: EMERGENCY MEDICINE

## 2020-04-11 PROCEDURE — 93005 ELECTROCARDIOGRAM TRACING: CPT

## 2020-04-11 PROCEDURE — 85025 COMPLETE CBC W/AUTO DIFF WBC: CPT | Performed by: EMERGENCY MEDICINE

## 2020-04-11 PROCEDURE — 83735 ASSAY OF MAGNESIUM: CPT | Performed by: PHYSICIAN ASSISTANT

## 2020-04-11 PROCEDURE — 80048 BASIC METABOLIC PNL TOTAL CA: CPT | Performed by: EMERGENCY MEDICINE

## 2020-04-11 PROCEDURE — 99284 EMERGENCY DEPT VISIT MOD MDM: CPT | Performed by: EMERGENCY MEDICINE

## 2020-04-11 RX ORDER — SODIUM CHLORIDE 9 MG/ML
125 INJECTION, SOLUTION INTRAVENOUS CONTINUOUS
Status: DISCONTINUED | OUTPATIENT
Start: 2020-04-11 | End: 2020-04-12 | Stop reason: HOSPADM

## 2020-04-11 RX ORDER — AZITHROMYCIN 250 MG/1
250 TABLET, FILM COATED ORAL EVERY 24 HOURS
Status: DISCONTINUED | OUTPATIENT
Start: 2020-04-12 | End: 2020-04-12 | Stop reason: HOSPADM

## 2020-04-11 RX ORDER — ACETAMINOPHEN 325 MG/1
650 TABLET ORAL EVERY 6 HOURS PRN
Status: DISCONTINUED | OUTPATIENT
Start: 2020-04-11 | End: 2020-04-12 | Stop reason: HOSPADM

## 2020-04-11 RX ORDER — AZITHROMYCIN 500 MG/1
500 TABLET, FILM COATED ORAL EVERY 24 HOURS
Status: DISCONTINUED | OUTPATIENT
Start: 2020-04-12 | End: 2020-04-11

## 2020-04-11 RX ORDER — AZITHROMYCIN 500 MG/1
500 TABLET, FILM COATED ORAL ONCE
Status: COMPLETED | OUTPATIENT
Start: 2020-04-11 | End: 2020-04-11

## 2020-04-11 RX ORDER — CALCIUM CARBONATE 200(500)MG
1000 TABLET,CHEWABLE ORAL DAILY PRN
Status: DISCONTINUED | OUTPATIENT
Start: 2020-04-11 | End: 2020-04-12 | Stop reason: HOSPADM

## 2020-04-11 RX ORDER — HYDROXYCHLOROQUINE SULFATE 200 MG/1
200 TABLET, FILM COATED ORAL EVERY 12 HOURS
Status: DISCONTINUED | OUTPATIENT
Start: 2020-04-12 | End: 2020-04-12 | Stop reason: HOSPADM

## 2020-04-11 RX ORDER — AZITHROMYCIN 250 MG/1
250 TABLET, FILM COATED ORAL EVERY 24 HOURS
Status: DISCONTINUED | OUTPATIENT
Start: 2020-04-13 | End: 2020-04-11

## 2020-04-11 RX ORDER — HYDROXYCHLOROQUINE SULFATE 200 MG/1
400 TABLET, FILM COATED ORAL EVERY 12 HOURS
Status: COMPLETED | OUTPATIENT
Start: 2020-04-11 | End: 2020-04-11

## 2020-04-11 RX ADMIN — SODIUM CHLORIDE 125 ML/HR: 0.9 INJECTION, SOLUTION INTRAVENOUS at 16:13

## 2020-04-11 RX ADMIN — CEFTRIAXONE SODIUM 1000 MG: 10 INJECTION, POWDER, FOR SOLUTION INTRAVENOUS at 02:42

## 2020-04-11 RX ADMIN — HYDROXYCHLOROQUINE SULFATE 400 MG: 200 TABLET, FILM COATED ORAL at 05:52

## 2020-04-11 RX ADMIN — HYDROXYCHLOROQUINE SULFATE 400 MG: 200 TABLET, FILM COATED ORAL at 16:14

## 2020-04-11 RX ADMIN — SODIUM CHLORIDE 125 ML/HR: 0.9 INJECTION, SOLUTION INTRAVENOUS at 23:58

## 2020-04-11 RX ADMIN — SODIUM CHLORIDE 125 ML/HR: 0.9 INJECTION, SOLUTION INTRAVENOUS at 12:37

## 2020-04-11 RX ADMIN — AZITHROMYCIN 500 MG: 500 TABLET, FILM COATED ORAL at 02:44

## 2020-04-11 RX ADMIN — ENOXAPARIN SODIUM 40 MG: 40 INJECTION SUBCUTANEOUS at 10:17

## 2020-04-11 RX ADMIN — SODIUM CHLORIDE 500 ML: 0.9 INJECTION, SOLUTION INTRAVENOUS at 10:30

## 2020-04-12 VITALS
TEMPERATURE: 98.2 F | RESPIRATION RATE: 18 BRPM | HEART RATE: 71 BPM | SYSTOLIC BLOOD PRESSURE: 90 MMHG | DIASTOLIC BLOOD PRESSURE: 52 MMHG | BODY MASS INDEX: 34.19 KG/M2 | WEIGHT: 163.58 LBS | OXYGEN SATURATION: 94 %

## 2020-04-12 PROCEDURE — 99217 PR OBSERVATION CARE DISCHARGE MANAGEMENT: CPT | Performed by: PHYSICIAN ASSISTANT

## 2020-04-12 RX ORDER — HYDROXYCHLOROQUINE SULFATE 200 MG/1
200 TABLET, FILM COATED ORAL EVERY 12 HOURS
Qty: 7 TABLET | Refills: 0 | Status: SHIPPED | OUTPATIENT
Start: 2020-04-12 | End: 2020-09-17 | Stop reason: ALTCHOICE

## 2020-04-12 RX ADMIN — CEFTRIAXONE SODIUM 1000 MG: 10 INJECTION, POWDER, FOR SOLUTION INTRAVENOUS at 03:59

## 2020-04-12 RX ADMIN — AZITHROMYCIN 250 MG: 250 TABLET, FILM COATED ORAL at 04:00

## 2020-04-12 RX ADMIN — ENOXAPARIN SODIUM 40 MG: 40 INJECTION SUBCUTANEOUS at 08:46

## 2020-04-12 RX ADMIN — SODIUM CHLORIDE 125 ML/HR: 0.9 INJECTION, SOLUTION INTRAVENOUS at 08:44

## 2020-04-12 RX ADMIN — HYDROXYCHLOROQUINE SULFATE 200 MG: 200 TABLET, FILM COATED ORAL at 04:00

## 2020-04-13 ENCOUNTER — TRANSITIONAL CARE MANAGEMENT (OUTPATIENT)
Dept: FAMILY MEDICINE CLINIC | Facility: CLINIC | Age: 50
End: 2020-04-13

## 2020-04-13 ENCOUNTER — TELEMEDICINE (OUTPATIENT)
Dept: FAMILY MEDICINE CLINIC | Facility: CLINIC | Age: 50
End: 2020-04-13
Payer: COMMERCIAL

## 2020-04-13 DIAGNOSIS — R52 GENERALIZED BODY ACHES: ICD-10-CM

## 2020-04-13 DIAGNOSIS — B97.21 SARS-ASSOCIATED CORONAVIRUS INFECTION: Primary | ICD-10-CM

## 2020-04-13 DIAGNOSIS — Z76.89 ENCOUNTER FOR SUPPORT AND COORDINATION OF TRANSITION OF CARE: ICD-10-CM

## 2020-04-13 PROCEDURE — 99496 TRANSJ CARE MGMT HIGH F2F 7D: CPT | Performed by: NURSE PRACTITIONER

## 2020-04-14 LAB
G6PD BLD QN: 212 U/10E12 RBC (ref 146–376)
RBC # BLD AUTO: 5.11 X10E6/UL (ref 3.77–5.28)

## 2020-04-15 ENCOUNTER — TELEMEDICINE (OUTPATIENT)
Dept: FAMILY MEDICINE CLINIC | Facility: CLINIC | Age: 50
End: 2020-04-15
Payer: COMMERCIAL

## 2020-04-15 DIAGNOSIS — B97.21 SARS-ASSOCIATED CORONAVIRUS INFECTION: Primary | ICD-10-CM

## 2020-04-15 PROCEDURE — 99213 OFFICE O/P EST LOW 20 MIN: CPT | Performed by: NURSE PRACTITIONER

## 2020-04-20 ENCOUNTER — TELEMEDICINE (OUTPATIENT)
Dept: FAMILY MEDICINE CLINIC | Facility: CLINIC | Age: 50
End: 2020-04-20
Payer: COMMERCIAL

## 2020-04-20 DIAGNOSIS — B97.21 SARS-ASSOCIATED CORONAVIRUS INFECTION: Primary | ICD-10-CM

## 2020-04-20 PROCEDURE — 99213 OFFICE O/P EST LOW 20 MIN: CPT | Performed by: NURSE PRACTITIONER

## 2020-06-01 ENCOUNTER — TELEPHONE (OUTPATIENT)
Dept: FAMILY MEDICINE CLINIC | Facility: CLINIC | Age: 50
End: 2020-06-01

## 2020-06-02 ENCOUNTER — TELEPHONE (OUTPATIENT)
Dept: FAMILY MEDICINE CLINIC | Facility: CLINIC | Age: 50
End: 2020-06-02

## 2020-06-03 ENCOUNTER — TELEPHONE (OUTPATIENT)
Dept: OTHER | Facility: HOSPITAL | Age: 50
End: 2020-06-03

## 2020-07-08 DIAGNOSIS — Z78.0 MENOPAUSE: ICD-10-CM

## 2020-09-17 ENCOUNTER — OFFICE VISIT (OUTPATIENT)
Dept: FAMILY MEDICINE CLINIC | Facility: CLINIC | Age: 50
End: 2020-09-17
Payer: COMMERCIAL

## 2020-09-17 VITALS
SYSTOLIC BLOOD PRESSURE: 110 MMHG | HEIGHT: 58 IN | BODY MASS INDEX: 35.26 KG/M2 | WEIGHT: 168 LBS | OXYGEN SATURATION: 97 % | TEMPERATURE: 97.9 F | DIASTOLIC BLOOD PRESSURE: 70 MMHG | RESPIRATION RATE: 16 BRPM | HEART RATE: 72 BPM

## 2020-09-17 DIAGNOSIS — Z12.39 SCREENING FOR BREAST CANCER: ICD-10-CM

## 2020-09-17 DIAGNOSIS — Z23 NEED FOR PNEUMOCOCCAL VACCINE: ICD-10-CM

## 2020-09-17 DIAGNOSIS — Z11.4 SCREENING FOR HUMAN IMMUNODEFICIENCY VIRUS: ICD-10-CM

## 2020-09-17 DIAGNOSIS — Z00.01 ENCOUNTER FOR GENERAL ADULT MEDICAL EXAMINATION WITH ABNORMAL FINDINGS: Primary | ICD-10-CM

## 2020-09-17 PROBLEM — B97.21 SARS-ASSOCIATED CORONAVIRUS INFECTION: Status: RESOLVED | Noted: 2020-04-13 | Resolved: 2020-09-17

## 2020-09-17 PROBLEM — N20.1 URETERAL CALCULUS, RIGHT: Status: RESOLVED | Noted: 2018-02-05 | Resolved: 2020-09-17

## 2020-09-17 PROBLEM — Q60.2 CONGENITAL ABSENCE OF KIDNEY: Status: ACTIVE | Noted: 2018-01-20

## 2020-09-17 PROBLEM — A04.8 H. PYLORI INFECTION: Status: RESOLVED | Noted: 2018-09-20 | Resolved: 2020-09-17

## 2020-09-17 PROBLEM — R52 GENERALIZED BODY ACHES: Status: RESOLVED | Noted: 2020-04-07 | Resolved: 2020-09-17

## 2020-09-17 PROBLEM — K80.10 CHOLECYSTITIS WITH CHOLELITHIASIS: Status: RESOLVED | Noted: 2018-04-17 | Resolved: 2020-09-17

## 2020-09-17 PROBLEM — R63.5 WEIGHT GAIN: Status: RESOLVED | Noted: 2019-11-11 | Resolved: 2020-09-17

## 2020-09-17 PROBLEM — N13.2 URETERAL STONE WITH HYDRONEPHROSIS: Status: RESOLVED | Noted: 2018-01-20 | Resolved: 2020-09-17

## 2020-09-17 PROBLEM — J18.9 PNEUMONIA: Status: RESOLVED | Noted: 2020-04-11 | Resolved: 2020-09-17

## 2020-09-17 PROBLEM — N39.0 UTI (URINARY TRACT INFECTION): Status: RESOLVED | Noted: 2018-01-20 | Resolved: 2020-09-17

## 2020-09-17 PROBLEM — Z20.828 SARS-ASSOCIATED CORONAVIRUS EXPOSURE: Status: RESOLVED | Noted: 2020-04-07 | Resolved: 2020-09-17

## 2020-09-17 PROBLEM — K29.70 GASTRITIS WITHOUT BLEEDING: Status: RESOLVED | Noted: 2018-05-31 | Resolved: 2020-09-17

## 2020-09-17 PROBLEM — K59.1 FUNCTIONAL DIARRHEA: Status: RESOLVED | Noted: 2018-08-24 | Resolved: 2020-09-17

## 2020-09-17 PROCEDURE — 90732 PPSV23 VACC 2 YRS+ SUBQ/IM: CPT | Performed by: FAMILY MEDICINE

## 2020-09-17 PROCEDURE — 1036F TOBACCO NON-USER: CPT | Performed by: FAMILY MEDICINE

## 2020-09-17 PROCEDURE — 99396 PREV VISIT EST AGE 40-64: CPT | Performed by: FAMILY MEDICINE

## 2020-09-17 PROCEDURE — 90471 IMMUNIZATION ADMIN: CPT | Performed by: FAMILY MEDICINE

## 2020-09-17 NOTE — ASSESSMENT & PLAN NOTE
Patient is here for physical exam I found patient without any distress  Patient does chronic conditions kidney stone I  Recommended her to  exercise at least 3 1/2  hours per week and maintain a healthy diet with low carbohydrate and low saturated fat    Patient understands the need for an annual physical exam

## 2020-09-17 NOTE — PATIENT INSTRUCTIONS
Calorie Counting Diet   WHAT YOU NEED TO KNOW:   What is a calorie counting diet? It is a meal plan based on counting calories each day to reach a healthy body weight  You will need to eat fewer calories if you are trying to lose weight  Weight loss may decrease your risk for certain health problems or improve your health if you have health problems  Some of these health problems include heart disease, high blood pressure, and diabetes  What foods should I avoid? Your dietitian will tell you if you need to avoid certain foods based on your body weight and health condition  You may need to avoid high-fat foods if you are at risk for or have heart disease  You may need to eat fewer foods from the breads and starches food group if you have diabetes  How many calories are in foods? The following is a list of foods and drinks with the approximate number of calories in each  Check the food label to find the exact number of calories  A dietitian can tell you how many calories you should have from each food group each day    · Carbohydrate:      ¨ ½ of a 3-inch bagel, 1 slice of bread, or ½ of a hamburger bun or hot dog bun (80)    ¨ 1 (8-inch) flour tortilla or ½ cup of cooked rice (100)    ¨ 1 (6-inch) corn tortilla (80)    ¨ 1 (6-inch) pancake or 1 cup of bran flakes cereal (110)    ¨ ½ cup of cooked cereal (80)    ¨ ½ cup of cooked pasta (85)    ¨ 1 ounce of pretzels (100)    ¨ 3 cups of air-popped popcorn without butter or oil (80)    · Dairy:      ¨ 1 cup of skim or 1% milk (90)    ¨ 1 cup of 2% milk (120)    ¨ 1 cup of whole milk (160)    ¨ 1 cup of 2% chocolate milk (220)    ¨ 1 ounce of low-fat cheese with 3 grams of fat per ounce (70)    ¨ 1 ounce of cheddar cheese (114)    ¨ ½ cup of 1% fat cottage cheese (80)    ¨ 1 cup of plain or sugar-free, fat-free yogurt (90)    · Protein foods:      ¨ 3 ounces of fish (not breaded or fried) (95)    ¨ 3 ounces of breaded, fried fish (195)    ¨ ¾ cup of tuna canned in water (105)    ¨ 3 ounces of chicken breast without skin (105)    ¨ 1 fried chicken breast with skin (350)    ¨ ¼ cup of fat free egg substitute (40)    ¨ 1 large egg (75)    ¨ 3 ounces of lean beef or pork (165)    ¨ 3 ounces of fried pork chop or ham (185)    ¨ ½ cup of cooked dried beans, such as kidney, raphael, lentils, or navy (115)    ¨ 3 ounces of bologna or lunch meat (225)    ¨ 2 links of breakfast sausage (140)    · Vegetables:      ¨ ½ cup of sliced mushrooms (10)    ¨ 1 cup of salad greens, such as lettuce, spinach, or chris (15)    ¨ ½ cup of steamed asparagus (20)    ¨ ½ cup of cooked summer squash, zucchini squash, or green or wax beans (25)    ¨ 1 cup of broccoli or cauliflower florets, or 1 medium tomato (25)    ¨ 1 large raw carrot or ½ cup of cooked carrots (40)    ¨ ? of a medium cucumber or 1 stalk of celery (5)    ¨ 1 small baked potato (160)    ¨ 1 cup of breaded, fried vegetables (230)    · Fruit:      ¨ 1 (6-inch) banana (55)     ¨ ½ of a 4-inch grapefruit (55)    ¨ 15 grapes (60)    ¨ 1 medium orange or apple (70)    ¨ 1 large peach (65)    ¨ 1 cup of fresh pineapple chunks (75)    ¨ 1 cup of melon cubes (50)    ¨ 1¼ cups of whole strawberries (45)    ¨ ½ cup of fruit canned in juice (55)    ¨ ½ cup of fruit canned in heavy syrup (110)    ¨ ?  cup of raisins (130)    ¨ ½ cup of unsweetened fruit juice (60)    ¨ ½ cup of grape, cranberry, or prune juice (90)    · Fat:      ¨ 10 peanuts or 2 teaspoons of peanut butter (55)    ¨ 2 tablespoons of avocado or 1 tablespoon of regular salad dressing (45)    ¨ 2 slices of krutz (90)    ¨ 1 teaspoon of oil, such as safflower, canola, corn, or olive oil (45)    ¨ 2 teaspoons of low-fat margarine, or 1 tablespoon of low-fat mayonnaise (50)    ¨ 1 teaspoon of regular margarine (40)    ¨ 1 tablespoon of regular mayonnaise (135)    ¨ 1 tablespoon of cream cheese or 2 tablespoons of low-fat cream cheese (45)    ¨ 2 tablespoons of vegetable shortening (215)    · Dessert and sweets:      ¨ 8 animal crackers or 5 vanilla wafers (80)    ¨ 1 frozen fruit juice bar (80)    ¨ ½ cup of ice milk or low-fat frozen yogurt (90)    ¨ ½ cup of sherbet or sorbet (125)    ¨ ½ cup of sugar-free pudding or custard (60)    ¨ ½ cup of ice cream (140)    ¨ ½ cup of pudding or custard (175)    ¨ 1 (2-inch) square chocolate brownie (185)    · Combination foods:      ¨ Bean burrito made with an 8-inch tortilla, without cheese (275)    ¨ Chicken breast sandwich with lettuce and tomato (325)    ¨ 1 cup of chicken noodle soup (60)    ¨ 1 beef taco (175)    ¨ Regular hamburger with lettuce and tomato (310)    ¨ Regular cheeseburger with lettuce and tomato (410)     ¨ ¼ of a 12-inch cheese pizza (280)    ¨ Fried fish sandwich with lettuce and tomato (425)    ¨ Hot dog and bun (275)    ¨ 1½ cups of macaroni and cheese (310)    ¨ Taco salad with a fried tortilla shell (870)    · Low-calorie foods:      ¨ 1 tablespoon of ketchup or 1 tablespoon of fat free sour cream (15)    ¨ 1 teaspoon of mustard (5)    ¨ ¼ cup of salsa (20)    ¨ 1 large dill pickle (15)    ¨ 1 tablespoon of fat free salad dressing (10)    ¨ 2 teaspoons of low-sugar, light jam or jelly, or 1 tablespoon of sugar-free syrup (15)    ¨ 1 sugar-free popsicle (15)    ¨ 1 cup of club soda, seltzer water, or diet soda (0)  CARE AGREEMENT:   You have the right to help plan your care  Discuss treatment options with your caregivers to decide what care you want to receive  You always have the right to refuse treatment  The above information is an  only  It is not intended as medical advice for individual conditions or treatments  Talk to your doctor, nurse or pharmacist before following any medical regimen to see if it is safe and effective for you  © 2017 [address] Information is for End User's use only and may not be sold, redistributed or otherwise used for commercial purposes   All illustrations and images included in CareNotes® are the copyrighted property of A D A M , Inc  or Efrain Holland

## 2020-09-17 NOTE — PROGRESS NOTES
Assessment/Plan:    BMI 35 0-35 9,adult  The BMI is above normal  Nutrition recommendations include reducing portion sizes, decreasing overall calorie intake, 3-5 servings of fruits/vegetables daily, reducing fast food intake, consuming healthier snacks, decreasing soda and/or juice intake, moderation in carbohydrate intake, increasing intake of lean protein, reducing intake of saturated fat and trans fat, and reducing intake of cholesterol  Exercise recommendations include moderate aerobic physical activity for 150 minutes/week, vigorous aerobic physical activity for 75 minutes/week if possible, the most usual recommendation is exercising 3-5 times per week, joining a gym is a reasonable option with the precaution of the current situation, and strength training exercises are always important as we the as well  Diagnoses and all orders for this visit:    Encounter for general adult medical examination with abnormal findings  -     Lipid panel; Future  -     CBC and differential; Future  -     Comprehensive metabolic panel; Future    Screening for human immunodeficiency virus  -     HIV 1/2 Antigen/Antibody (4th Generation) w Reflex [facility]; Future    Need for pneumococcal vaccine  -     PNEUMOCOCCAL POLYSACCHARIDE VACCINE 23-VALENT =>1YO SQ IM    Screening for breast cancer  -     Mammo screening bilateral w 3d & cad; Future    BMI 35 0-35 9,adult          Subjective:      Patient ID: Francoise King is a 52 y o  female  HPI  Patient is here for PE, not having any acute distress  The following portions of the patient's history were reviewed and updated as appropriate: allergies, current medications, past family history, past medical history, past social history, past surgical history and problem list     Review of Systems   Constitutional: Negative for diaphoresis, fatigue, fever and unexpected weight change  Respiratory: Negative for cough, chest tightness, shortness of breath and wheezing  Cardiovascular: Negative for chest pain, palpitations and leg swelling  Gastrointestinal: Negative for abdominal pain, blood in stool and constipation  Neurological: Negative for dizziness, syncope, light-headedness and headaches  Hematological: Does not bruise/bleed easily  Psychiatric/Behavioral: Negative for behavioral problems, self-injury and sleep disturbance  The patient is not nervous/anxious  Objective:      /70 (BP Location: Left arm, Patient Position: Sitting, Cuff Size: Standard)   Pulse 72   Temp 97 9 °F (36 6 °C) (Temporal)   Resp 16   Ht 4' 10" (1 473 m)   Wt 76 2 kg (168 lb)   SpO2 97%   BMI 35 11 kg/m²          Physical Exam  HENT:      Head: Normocephalic  Eyes:      Pupils: Pupils are equal, round, and reactive to light  Neck:      Musculoskeletal: Neck supple  Cardiovascular:      Rate and Rhythm: Normal rate and regular rhythm  Pulmonary:      Effort: Pulmonary effort is normal    Abdominal:      Palpations: Abdomen is soft  Musculoskeletal: Normal range of motion  Skin:     General: Skin is warm and dry  Neurological:      Mental Status: She is alert  Psychiatric:         Behavior: Behavior normal          BMI Counseling: Body mass index is 35 11 kg/m²  The BMI is above normal  Exercise recommendations include exercising 3-5 times per week

## 2020-09-17 NOTE — ASSESSMENT & PLAN NOTE
The BMI is above normal  Nutrition recommendations include reducing portion sizes, decreasing overall calorie intake, 3-5 servings of fruits/vegetables daily, reducing fast food intake, consuming healthier snacks, decreasing soda and/or juice intake, moderation in carbohydrate intake, increasing intake of lean protein, reducing intake of saturated fat and trans fat, and reducing intake of cholesterol  Exercise recommendations include moderate aerobic physical activity for 150 minutes/week, vigorous aerobic physical activity for 75 minutes/week if possible, the most usual recommendation is exercising 3-5 times per week, joining a gym is a reasonable option with the precaution of the current situation, and strength training exercises are always important as we the as well

## 2020-09-19 ENCOUNTER — LAB (OUTPATIENT)
Dept: LAB | Facility: CLINIC | Age: 50
End: 2020-09-19
Payer: COMMERCIAL

## 2020-09-19 DIAGNOSIS — Z11.4 SCREENING FOR HUMAN IMMUNODEFICIENCY VIRUS: ICD-10-CM

## 2020-09-19 DIAGNOSIS — Z00.01 ENCOUNTER FOR GENERAL ADULT MEDICAL EXAMINATION WITH ABNORMAL FINDINGS: ICD-10-CM

## 2020-09-19 LAB
ALBUMIN SERPL BCP-MCNC: 3.8 G/DL (ref 3.5–5)
ALP SERPL-CCNC: 56 U/L (ref 46–116)
ALT SERPL W P-5'-P-CCNC: 24 U/L (ref 12–78)
ANION GAP SERPL CALCULATED.3IONS-SCNC: 9 MMOL/L (ref 4–13)
AST SERPL W P-5'-P-CCNC: 11 U/L (ref 5–45)
BASOPHILS # BLD AUTO: 0.04 THOUSANDS/ΜL (ref 0–0.1)
BASOPHILS NFR BLD AUTO: 1 % (ref 0–1)
BILIRUB SERPL-MCNC: 0.4 MG/DL (ref 0.2–1)
BUN SERPL-MCNC: 15 MG/DL (ref 5–25)
CALCIUM SERPL-MCNC: 8.8 MG/DL (ref 8.3–10.1)
CHLORIDE SERPL-SCNC: 105 MMOL/L (ref 100–108)
CHOLEST SERPL-MCNC: 183 MG/DL (ref 50–200)
CO2 SERPL-SCNC: 27 MMOL/L (ref 21–32)
CREAT SERPL-MCNC: 0.83 MG/DL (ref 0.6–1.3)
EOSINOPHIL # BLD AUTO: 0.45 THOUSAND/ΜL (ref 0–0.61)
EOSINOPHIL NFR BLD AUTO: 7 % (ref 0–6)
ERYTHROCYTE [DISTWIDTH] IN BLOOD BY AUTOMATED COUNT: 12.5 % (ref 11.6–15.1)
GFR SERPL CREATININE-BSD FRML MDRD: 83 ML/MIN/1.73SQ M
GLUCOSE P FAST SERPL-MCNC: 97 MG/DL (ref 65–99)
HCT VFR BLD AUTO: 42.8 % (ref 34.8–46.1)
HDLC SERPL-MCNC: 68 MG/DL
HGB BLD-MCNC: 13.5 G/DL (ref 11.5–15.4)
IMM GRANULOCYTES # BLD AUTO: 0.01 THOUSAND/UL (ref 0–0.2)
IMM GRANULOCYTES NFR BLD AUTO: 0 % (ref 0–2)
LDLC SERPL CALC-MCNC: 106 MG/DL (ref 0–100)
LYMPHOCYTES # BLD AUTO: 1.2 THOUSANDS/ΜL (ref 0.6–4.47)
LYMPHOCYTES NFR BLD AUTO: 19 % (ref 14–44)
MCH RBC QN AUTO: 26.8 PG (ref 26.8–34.3)
MCHC RBC AUTO-ENTMCNC: 31.5 G/DL (ref 31.4–37.4)
MCV RBC AUTO: 85 FL (ref 82–98)
MONOCYTES # BLD AUTO: 0.71 THOUSAND/ΜL (ref 0.17–1.22)
MONOCYTES NFR BLD AUTO: 11 % (ref 4–12)
NEUTROPHILS # BLD AUTO: 3.82 THOUSANDS/ΜL (ref 1.85–7.62)
NEUTS SEG NFR BLD AUTO: 62 % (ref 43–75)
NONHDLC SERPL-MCNC: 115 MG/DL
NRBC BLD AUTO-RTO: 0 /100 WBCS
PLATELET # BLD AUTO: 243 THOUSANDS/UL (ref 149–390)
PMV BLD AUTO: 9.5 FL (ref 8.9–12.7)
POTASSIUM SERPL-SCNC: 4.5 MMOL/L (ref 3.5–5.3)
PROT SERPL-MCNC: 7.5 G/DL (ref 6.4–8.2)
RBC # BLD AUTO: 5.03 MILLION/UL (ref 3.81–5.12)
SODIUM SERPL-SCNC: 141 MMOL/L (ref 136–145)
TRIGL SERPL-MCNC: 45 MG/DL
WBC # BLD AUTO: 6.23 THOUSAND/UL (ref 4.31–10.16)

## 2020-09-19 PROCEDURE — 36415 COLL VENOUS BLD VENIPUNCTURE: CPT

## 2020-09-19 PROCEDURE — 87389 HIV-1 AG W/HIV-1&-2 AB AG IA: CPT

## 2020-09-19 PROCEDURE — 85025 COMPLETE CBC W/AUTO DIFF WBC: CPT

## 2020-09-19 PROCEDURE — 80053 COMPREHEN METABOLIC PANEL: CPT

## 2020-09-19 PROCEDURE — 80061 LIPID PANEL: CPT

## 2020-09-21 LAB — HIV 1+2 AB+HIV1 P24 AG SERPL QL IA: NORMAL

## 2020-10-22 ENCOUNTER — TELEPHONE (OUTPATIENT)
Dept: FAMILY MEDICINE CLINIC | Facility: CLINIC | Age: 50
End: 2020-10-22

## 2020-10-27 ENCOUNTER — OFFICE VISIT (OUTPATIENT)
Dept: FAMILY MEDICINE CLINIC | Facility: CLINIC | Age: 50
End: 2020-10-27
Payer: COMMERCIAL

## 2020-10-27 VITALS
RESPIRATION RATE: 16 BRPM | TEMPERATURE: 97.8 F | HEART RATE: 70 BPM | BODY MASS INDEX: 36.11 KG/M2 | OXYGEN SATURATION: 98 % | HEIGHT: 58 IN | WEIGHT: 172 LBS | SYSTOLIC BLOOD PRESSURE: 120 MMHG | DIASTOLIC BLOOD PRESSURE: 70 MMHG

## 2020-10-27 DIAGNOSIS — G43.909 MIGRAINE WITHOUT STATUS MIGRAINOSUS, NOT INTRACTABLE, UNSPECIFIED MIGRAINE TYPE: Primary | ICD-10-CM

## 2020-10-27 PROCEDURE — 3725F SCREEN DEPRESSION PERFORMED: CPT | Performed by: FAMILY MEDICINE

## 2020-10-27 PROCEDURE — 99213 OFFICE O/P EST LOW 20 MIN: CPT | Performed by: FAMILY MEDICINE

## 2020-10-27 PROCEDURE — 3008F BODY MASS INDEX DOCD: CPT | Performed by: FAMILY MEDICINE

## 2020-10-27 RX ORDER — BUTALBITAL, ACETAMINOPHEN AND CAFFEINE 50; 325; 40 MG/1; MG/1; MG/1
1 TABLET ORAL EVERY 6 HOURS PRN
Qty: 30 TABLET | Refills: 2 | Status: SHIPPED | OUTPATIENT
Start: 2020-10-27 | End: 2020-10-27 | Stop reason: SDUPTHER

## 2020-10-27 RX ORDER — BUTALBITAL, ACETAMINOPHEN AND CAFFEINE 50; 325; 40 MG/1; MG/1; MG/1
1 TABLET ORAL EVERY 6 HOURS PRN
Qty: 30 TABLET | Refills: 2 | Status: SHIPPED | OUTPATIENT
Start: 2020-10-27

## 2020-12-17 DIAGNOSIS — N95.1 MENOPAUSAL SYMPTOM: ICD-10-CM

## 2020-12-18 RX ORDER — ESTRADIOL 2 MG/1
TABLET ORAL
Qty: 90 TABLET | Refills: 2 | Status: SHIPPED | OUTPATIENT
Start: 2020-12-18 | End: 2021-12-06

## 2020-12-29 ENCOUNTER — OFFICE VISIT (OUTPATIENT)
Dept: FAMILY MEDICINE CLINIC | Facility: CLINIC | Age: 50
End: 2020-12-29
Payer: COMMERCIAL

## 2020-12-29 VITALS
BODY MASS INDEX: 36.94 KG/M2 | HEART RATE: 97 BPM | DIASTOLIC BLOOD PRESSURE: 72 MMHG | OXYGEN SATURATION: 98 % | WEIGHT: 176 LBS | TEMPERATURE: 97.9 F | RESPIRATION RATE: 18 BRPM | HEIGHT: 58 IN | SYSTOLIC BLOOD PRESSURE: 122 MMHG

## 2020-12-29 DIAGNOSIS — Z12.31 ENCOUNTER FOR SCREENING MAMMOGRAM FOR MALIGNANT NEOPLASM OF BREAST: ICD-10-CM

## 2020-12-29 DIAGNOSIS — M72.2 PLANTAR FASCIITIS: Primary | ICD-10-CM

## 2020-12-29 PROCEDURE — 99214 OFFICE O/P EST MOD 30 MIN: CPT | Performed by: NURSE PRACTITIONER

## 2020-12-29 RX ORDER — NAPROXEN 500 MG/1
500 TABLET ORAL 2 TIMES DAILY WITH MEALS
Qty: 60 TABLET | Refills: 0 | Status: SHIPPED | OUTPATIENT
Start: 2020-12-29 | End: 2021-08-17 | Stop reason: ALTCHOICE

## 2021-01-19 ENCOUNTER — IMMUNIZATIONS (OUTPATIENT)
Dept: FAMILY MEDICINE CLINIC | Facility: HOSPITAL | Age: 51
End: 2021-01-19

## 2021-01-19 DIAGNOSIS — Z23 ENCOUNTER FOR IMMUNIZATION: Primary | ICD-10-CM

## 2021-01-19 PROCEDURE — 91301 SARS-COV-2 / COVID-19 MRNA VACCINE (MODERNA) 100 MCG: CPT

## 2021-01-19 PROCEDURE — 0011A SARS-COV-2 / COVID-19 MRNA VACCINE (MODERNA) 100 MCG: CPT

## 2021-02-19 ENCOUNTER — IMMUNIZATIONS (OUTPATIENT)
Dept: FAMILY MEDICINE CLINIC | Facility: HOSPITAL | Age: 51
End: 2021-02-19

## 2021-02-19 DIAGNOSIS — Z23 ENCOUNTER FOR IMMUNIZATION: Primary | ICD-10-CM

## 2021-02-19 PROCEDURE — 0012A SARS-COV-2 / COVID-19 MRNA VACCINE (MODERNA) 100 MCG: CPT

## 2021-02-19 PROCEDURE — 91301 SARS-COV-2 / COVID-19 MRNA VACCINE (MODERNA) 100 MCG: CPT

## 2021-02-24 ENCOUNTER — TELEPHONE (OUTPATIENT)
Dept: ADMINISTRATIVE | Facility: OTHER | Age: 51
End: 2021-02-24

## 2021-02-24 DIAGNOSIS — Z12.39 SCREENING FOR BREAST CANCER: ICD-10-CM

## 2021-02-24 NOTE — TELEPHONE ENCOUNTER
----- Message from Gianfranco Lozano sent at 2/23/2021 10:45 AM EST -----  Regarding: MAMMOGRAM  02/23/21 10:45 AM    Hello, our patient No patient name on file  has had Mammogram completed/performed  Please assist in updating the patient chart by pulling the document from the Media Tab  The date of service is 2/19/21       Thank you,  Gloria Zepeda   [address]

## 2021-05-17 ENCOUNTER — OFFICE VISIT (OUTPATIENT)
Dept: FAMILY MEDICINE CLINIC | Facility: CLINIC | Age: 51
End: 2021-05-17
Payer: COMMERCIAL

## 2021-05-17 VITALS
OXYGEN SATURATION: 98 % | DIASTOLIC BLOOD PRESSURE: 70 MMHG | RESPIRATION RATE: 16 BRPM | HEART RATE: 80 BPM | SYSTOLIC BLOOD PRESSURE: 124 MMHG | WEIGHT: 183 LBS | HEIGHT: 58 IN | BODY MASS INDEX: 38.41 KG/M2 | TEMPERATURE: 98.1 F

## 2021-05-17 DIAGNOSIS — M46.1 SI (SACROILIAC) JOINT INFLAMMATION (HCC): Primary | ICD-10-CM

## 2021-05-17 DIAGNOSIS — R35.89 POLYURIA: ICD-10-CM

## 2021-05-17 PROCEDURE — 3725F SCREEN DEPRESSION PERFORMED: CPT | Performed by: FAMILY MEDICINE

## 2021-05-17 PROCEDURE — 99213 OFFICE O/P EST LOW 20 MIN: CPT | Performed by: FAMILY MEDICINE

## 2021-05-17 PROCEDURE — 3008F BODY MASS INDEX DOCD: CPT | Performed by: FAMILY MEDICINE

## 2021-05-17 PROCEDURE — 1036F TOBACCO NON-USER: CPT | Performed by: FAMILY MEDICINE

## 2021-05-17 RX ORDER — SENNOSIDES 8.6 MG
650 CAPSULE ORAL EVERY 8 HOURS PRN
Qty: 30 TABLET | Refills: 0 | Status: SHIPPED | OUTPATIENT
Start: 2021-05-17 | End: 2021-10-01 | Stop reason: ALTCHOICE

## 2021-05-26 NOTE — PROGRESS NOTES
Assessment/Plan:  The choice of NSAID's must be use with caution in a higher risk patient  Like her with only one kidney  Acetaminophen can be used safetly in a appropriate dose only in a needed basis  The use ice and physical therapy is necessary in order to get the best results  Always use ice and therapy to decrease or avoid the need for a Pharmacological agent  No problem-specific Assessment & Plan notes found for this encounter  Diagnoses and all orders for this visit:    SI (sacroiliac) joint inflammation (HCC)  -     acetaminophen (TYLENOL) 650 mg CR tablet; Take 1 tablet (650 mg total) by mouth every 8 (eight) hours as needed for mild pain    Polyuria  -     POCT urine dip    Other orders  -     Cancel: Comprehensive metabolic panel; Future          Subjective:      Patient ID: Nikky Almazan is a 48 y o  female  function 48 y o  y o  complaining of   Acute pain in Lower back; started about 3 week(s) ago  The pain is  continuos and has been gradually worsening since onset  The quality of the pain is described as heaviness  The pain does not radiate  The pain is at a severity of 5/10  The symptoms are aggravated by movement  Associated symptoms include numbness, paresthesias and tingling  she  had tried multiple alternatives without improved her  Symptoms  she is concerning due to having only one kidney  The following portions of the patient's history were reviewed and updated as appropriate: allergies, current medications, past family history, past medical history, past social history, past surgical history and problem list     Review of Systems   Constitutional: Negative for diaphoresis, fatigue, fever and unexpected weight change  Respiratory: Negative for cough, chest tightness, shortness of breath and wheezing  Cardiovascular: Negative for chest pain, palpitations and leg swelling  Gastrointestinal: Negative for abdominal pain, blood in stool and constipation  Musculoskeletal: Positive for back pain  Neurological: Negative for dizziness, syncope, light-headedness and headaches  Hematological: Does not bruise/bleed easily  Psychiatric/Behavioral: Negative for behavioral problems, self-injury and sleep disturbance  The patient is not nervous/anxious  Objective:      /70 (BP Location: Left arm, Patient Position: Sitting, Cuff Size: Standard)   Pulse 80   Temp 98 1 °F (36 7 °C) (Temporal)   Resp 16   Ht 4' 10" (1 473 m)   Wt 83 kg (183 lb)   SpO2 98%   Breastfeeding No   BMI 38 25 kg/m²          Physical Exam  HENT:      Head: Normocephalic  Eyes:      Pupils: Pupils are equal, round, and reactive to light  Neck:      Musculoskeletal: Neck supple  Cardiovascular:      Rate and Rhythm: Normal rate and regular rhythm  Pulmonary:      Effort: Pulmonary effort is normal    Abdominal:      Palpations: Abdomen is soft  Genitourinary:     Comments:  Urine dip in this office was normal  Musculoskeletal: Normal range of motion  General: Tenderness ( lower back  More intense over the  right SI joint area) present  Skin:     General: Skin is warm and dry  Neurological:      Mental Status: She is alert     Psychiatric:         Behavior: Behavior normal

## 2021-06-27 ENCOUNTER — OFFICE VISIT (OUTPATIENT)
Dept: URGENT CARE | Facility: CLINIC | Age: 51
End: 2021-06-27
Payer: COMMERCIAL

## 2021-06-27 VITALS
HEART RATE: 92 BPM | HEIGHT: 58 IN | WEIGHT: 173 LBS | OXYGEN SATURATION: 98 % | RESPIRATION RATE: 26 BRPM | BODY MASS INDEX: 36.31 KG/M2 | SYSTOLIC BLOOD PRESSURE: 141 MMHG | TEMPERATURE: 96.4 F | DIASTOLIC BLOOD PRESSURE: 82 MMHG

## 2021-06-27 DIAGNOSIS — J45.21 MILD INTERMITTENT ASTHMA WITH ACUTE EXACERBATION: Primary | ICD-10-CM

## 2021-06-27 PROCEDURE — 99213 OFFICE O/P EST LOW 20 MIN: CPT | Performed by: PHYSICIAN ASSISTANT

## 2021-06-27 RX ORDER — ALBUTEROL SULFATE 90 UG/1
2 AEROSOL, METERED RESPIRATORY (INHALATION) EVERY 6 HOURS PRN
Qty: 8.5 G | Refills: 0 | Status: SHIPPED | OUTPATIENT
Start: 2021-06-27 | End: 2021-10-01 | Stop reason: ALTCHOICE

## 2021-06-27 RX ORDER — METHYLPREDNISOLONE 4 MG/1
TABLET ORAL
Qty: 1 EACH | Refills: 0 | Status: SHIPPED | OUTPATIENT
Start: 2021-06-27 | End: 2021-10-01 | Stop reason: ALTCHOICE

## 2021-06-27 NOTE — PATIENT INSTRUCTIONS
Begin taking albuterol inhaler every 4-6 hours as needed to help with symptoms  Begin Medrol Dosepak as prescribed  Continue to monitor symptoms  Purchased pulse oximeter from local pharmacy to continue to monitor oxygenation status  Proceed directly to the emergency room with any worsening of symptoms, failure of improvement, pulse oximeter less than 94%, worsening shortness of breath, chest pain, difficulties breathing, difficulties tolerating oral intake, fever chills not responsive to over-the-counter medication    Shortness of Breath   WHAT YOU NEED TO KNOW:   Shortness of breath is a feeling that you cannot get enough air when you breathe in  You may have this feeling only during activity, or all the time  Your symptoms can range from mild to severe  Shortness of breath may be a sign of a serious health condition that needs immediate care  DISCHARGE INSTRUCTIONS:   Return to the emergency department if:   · Your signs and symptoms are the same or worse within 24 hours of treatment  · The skin over your ribs or on your neck sinks in when you breathe  · You feel confused or dizzy  Contact your healthcare provider if:   · You have new or worsening symptoms  · You have questions or concerns about your condition or care  Medicines:   · Medicines  may be used to treat the cause of your symptoms  You may need medicine to treat a bacterial infection or reduce anxiety  Other medicines may be used to open your airway, reduce swelling, or remove extra fluid  If you have a heart condition, you may need medicine to help your heart beat more strongly or regularly  · Take your medicine as directed  Contact your healthcare provider if you think your medicine is not helping or if you have side effects  Tell him or her if you are allergic to any medicine  Keep a list of the medicines, vitamins, and herbs you take  Include the amounts, and when and why you take them   Bring the list or the pill bottles to follow-up visits  Carry your medicine list with you in case of an emergency  Manage shortness of breath:   · Create an action plan  You and your healthcare provider can work together to create a plan for how to handle shortness of breath  The plan can include daily activities, treatment changes, and what to do if you have severe breathing problems  · Lean forward on your elbows when you sit  This helps your lungs expand and may make it easier to breathe  · Use pursed-lip breathing any time you feel short of breath  Breathe in through your nose and then slowly breathe out through your mouth with your lips slightly puckered  It should take you twice as long to breathe out as it did to breathe in  · Do not smoke  Nicotine and other chemicals in cigarettes and cigars can cause lung damage and make shortness of breath worse  Ask your healthcare provider for information if you currently smoke and need help to quit  E-cigarettes or smokeless tobacco still contain nicotine  Talk to your healthcare provider before you use these products  · Reach or maintain a healthy weight  Your healthcare provider can help you create a safe weight loss plan if you are overweight  · Exercise as directed  Exercise can help your lungs work more easily  Exercise can also help you lose weight if needed  Try to get at least 30 minutes of exercise most days of the week  Follow up with your healthcare provider or specialist as directed:  Write down your questions so you remember to ask them during your visits  © Copyright [address] Information is for End User's use only and may not be sold, redistributed or otherwise used for commercial purposes  All illustrations and images included in CareNotes® are the copyrighted property of [employer]  or [facility] Marycruz Walter   The above information is an  only  It is not intended as medical advice for individual conditions or treatments  Talk to your doctor, nurse or pharmacist before following any medical regimen to see if it is safe and effective for you

## 2021-06-27 NOTE — PROGRESS NOTES
[address]        NAME: Raquel Conway is a 48 y o  female  : 1970    MRN: [id number]  DATE: 2021  TIME: 1:15 PM    Assessment and Plan   Mild intermittent asthma with acute exacerbation [J45 21]  1  Mild intermittent asthma with acute exacerbation  albuterol (ProAir HFA) 90 mcg/act inhaler    methylPREDNISolone 4 MG tablet therapy pack         Patient Instructions       Begin taking albuterol inhaler every 4-6 hours as needed to help with symptoms  Begin Medrol Dosepak as prescribed  Continue to monitor symptoms  Purchased pulse oximeter from local pharmacy to continue to monitor oxygenation status  Proceed directly to the emergency room with any worsening of symptoms, failure of improvement, pulse oximeter less than 94%, worsening shortness of breath, chest pain, difficulties breathing, difficulties tolerating oral intake, fever chills not responsive to over-the-counter medication  Follow up with PCP in 3-5 days  Proceed to  ER if symptoms worsen  Patient agreeable with treatment plan  Chief Complaint     Chief Complaint   Patient presents with    Shortness of Breath     Started a few days ago muscex taken     Sore Throat         History of Present Illness       48year old female presents to the office for sore throat and feelings of shortness of breath as well as sore throat, congestion and cough that began for her 4 days ago  She denies any sick contacts as far as she is aware  She had COVID 1 year ago  She also notes that she had both COVID vaccines  Currently rate sore throat 4/10 in nature  Still able to tolerate oral intake  Does admit to asthma in the past, but does not have albuterol inhaler or medications at home to control symptoms  Has associated nasal congestion, cough, headaches and hoarseness of the voice  Patient notes that yesterday she was here secondhand smoke which aggravated her cough and symptoms    She denies any sick contacts as far she is aware   Has been using Mucinex as needed help with her symptoms without relief  Patient denies any swelling of the legs, chest pain, pain of legs  She denies any history of DVT or PE  Sore Throat   This is a new problem  The current episode started in the past 7 days  The problem has been unchanged  Neither side of throat is experiencing more pain than the other  There has been no fever  The pain is at a severity of 4/10  Associated symptoms include congestion, coughing (dry cough ), headaches, a hoarse voice and shortness of breath (at times )  Pertinent negatives include no ear discharge or trouble swallowing  Exposure to: no known sick exposures  Treatments tried: Mucinex  The treatment provided mild relief  Review of Systems   Review of Systems   Constitutional: Negative for chills and fever  HENT: Positive for congestion, hoarse voice, postnasal drip and sore throat  Negative for ear discharge, rhinorrhea, sinus pressure and trouble swallowing  Respiratory: Positive for cough (dry cough ), shortness of breath (at times ) and wheezing  Cardiovascular: Negative for chest pain, palpitations and leg swelling  Gastrointestinal: Negative  Genitourinary: Negative  Musculoskeletal: Negative for joint swelling (denies leg swelling  )  Skin: Negative  Neurological: Positive for light-headedness (at times ) and headaches  Negative for dizziness and syncope           Current Medications       Current Outpatient Medications:     acetaminophen (TYLENOL) 650 mg CR tablet, Take 1 tablet (650 mg total) by mouth every 8 (eight) hours as needed for mild pain, Disp: 30 tablet, Rfl: 0    butalbital-acetaminophen-caffeine (FIORICET,ESGIC) -40 mg per tablet, Take 1 tablet by mouth every 6 (six) hours as needed for headaches, Disp: 30 tablet, Rfl: 2    Calcium Carbonate-Vitamin D (Caltrate 600+D) 600-400 MG-UNIT per tablet, Take 1 tablet by mouth daily, Disp: 60 tablet, Rfl: 5    estradiol (ESTRACE) 2 MG tablet, TAKE 1 TABLET BY MOUTH EVERY DAY, Disp: 90 tablet, Rfl: 2    naproxen (NAPROSYN) 500 mg tablet, Take 1 tablet (500 mg total) by mouth 2 (two) times a day with meals, Disp: 60 tablet, Rfl: 0    albuterol (ProAir HFA) 90 mcg/act inhaler, Inhale 2 puffs every 6 (six) hours as needed for wheezing or shortness of breath, Disp: 8 5 g, Rfl: 0    methylPREDNISolone 4 MG tablet therapy pack, Use as directed on package, Disp: 1 each, Rfl: 0    Current Allergies     Allergies as of 06/27/2021 - Reviewed 06/27/2021   Allergen Reaction Noted    Nitrofurantoin Shortness Of Breath and Hallucinations 07/10/2016    Reglan [metoclopramide] Shortness Of Breath 07/10/2016    Benadryl [diphenhydramine] Palpitations 07/10/2016            The following portions of the patient's history were reviewed and updated as appropriate: allergies, current medications, past family history, past medical history, past social history, past surgical history and problem list      Past Medical History:   Diagnosis Date    Cholelithiasis     Chronic kidney disease     born with 1 kidney, right    Colon polyp     COVID-19 2020    GERD (gastroesophageal reflux disease)     H  pylori infection 09/2018    Kidney stone     Migraines     Obesity        Past Surgical History:   Procedure Laterality Date    CHOLECYSTECTOMY      COLONOSCOPY      EGD AND COLONOSCOPY  09/2018    positive h pylori, colon polyp, repeat 5 yrs    HYSTERECTOMY      KNEE SURGERY Left     FL COLONOSCOPY FLX DX W/COLLJ SPEC WHEN PFRMD N/A 9/12/2018    Procedure: EGD AND COLONOSCOPY;  Surgeon: Alberto Flores MD;  Location: [facility];   Service: Gastroenterology    FL CYSTO/URETERO W/LITHOTRIPSY &INDWELL STENT INSRT Right 2/16/2018    Procedure: CYSTOSCOPY RIGHT URETEROSCOPY WITH , RIGTH RETROGRADE PYELOGRAM AND IRIGHT URETERAL STENT EXCHANGE;  Surgeon: Jose Webster MD;  Location[facility];  Service: Urology    FL Brittney Arshad CATHETER Right 1/21/2018    Procedure: CYSTOSCOPY; BILATERAL RETROGRADE PYELOGRAM WITH INSERTION STENT RIGHT URETERAL;  Surgeon: Katherin Chacon MD;  Location:  MAIN OR;  Service: Urology    MO LAP,CHOLECYSTECTOMY N/A 10/30/2018    Procedure: LAPAROSCOPIC CHOLECYSTECTOMY;  Surgeon: Jame Hwang DO;  Location: [address] [place] [address] MAIN OR;  Service: General       Family History   Problem Relation Age of Onset    Alzheimer's disease Mother     Schizophrenia Father     Bipolar disorder Father     Hypertension Father          Medications have been verified  Objective   /82   Pulse 92   Temp (!) 96 4 °F (35 8 °C)   Resp (!) 26   Ht 4' 10" (1 473 m)   Wt 78 5 kg (173 lb)   SpO2 98%   BMI 36 16 kg/m²   No LMP recorded  Patient has had a hysterectomy  Physical Exam     Physical Exam  Vitals and nursing note reviewed  Constitutional:       General: She is not in acute distress  Appearance: She is well-developed  She is not ill-appearing or diaphoretic  HENT:      Head: Normocephalic and atraumatic  Right Ear: Hearing, ear canal and external ear normal  No middle ear effusion  Left Ear: Hearing, ear canal and external ear normal   No middle ear effusion  Nose: Mucosal edema present  No rhinorrhea  Mouth/Throat:      Lips: Pink  Mouth: Mucous membranes are moist       Pharynx: Uvula midline  Posterior oropharyngeal erythema (mild erythma with PND Visualized in the posterior oropharynx) present  No oropharyngeal exudate or uvula swelling  Eyes:      General: Lids are normal       Conjunctiva/sclera: Conjunctivae normal       Pupils: Pupils are equal, round, and reactive to light  Cardiovascular:      Rate and Rhythm: Normal rate and regular rhythm  Heart sounds: Normal heart sounds, S1 normal and S2 normal  No murmur heard       Pulmonary:      Effort: Pulmonary effort is normal  Tachypnea (mild increase in Respiratory rate at 26  patient is able to talk of sentences ) present  No accessory muscle usage or respiratory distress  Breath sounds: No stridor  Wheezing (very faint in upper lung feilds) present  No decreased breath sounds, rhonchi or rales  Abdominal:      General: Bowel sounds are normal       Palpations: Abdomen is soft  Tenderness: There is no abdominal tenderness  Musculoskeletal:      Cervical back: Neck supple  Lymphadenopathy:      Cervical: No cervical adenopathy  Skin:     General: Skin is warm and dry  Findings: No rash  Neurological:      Mental Status: She is alert  Psychiatric:         Behavior: Behavior is cooperative

## 2021-08-17 ENCOUNTER — OFFICE VISIT (OUTPATIENT)
Dept: FAMILY MEDICINE CLINIC | Facility: CLINIC | Age: 51
End: 2021-08-17
Payer: COMMERCIAL

## 2021-08-17 ENCOUNTER — HOSPITAL ENCOUNTER (OUTPATIENT)
Dept: RADIOLOGY | Facility: HOSPITAL | Age: 51
Discharge: HOME | End: 2021-08-17
Payer: COMMERCIAL

## 2021-08-17 VITALS
OXYGEN SATURATION: 98 % | DIASTOLIC BLOOD PRESSURE: 82 MMHG | HEIGHT: 58 IN | TEMPERATURE: 97.7 F | SYSTOLIC BLOOD PRESSURE: 122 MMHG | HEART RATE: 96 BPM | BODY MASS INDEX: 36.98 KG/M2 | RESPIRATION RATE: 20 BRPM | WEIGHT: 176.2 LBS

## 2021-08-17 DIAGNOSIS — M25.561 ACUTE PAIN OF RIGHT KNEE: ICD-10-CM

## 2021-08-17 DIAGNOSIS — M54.41 ACUTE BILATERAL LOW BACK PAIN WITH RIGHT-SIDED SCIATICA: Primary | ICD-10-CM

## 2021-08-17 PROBLEM — N20.0 KIDNEY STONES: Status: RESOLVED | Noted: 2018-02-05 | Resolved: 2021-08-17

## 2021-08-17 PROBLEM — R05.9 COUGH: Status: RESOLVED | Noted: 2020-04-07 | Resolved: 2021-08-17

## 2021-08-17 PROBLEM — Z12.11 COLON CANCER SCREENING: Status: RESOLVED | Noted: 2018-08-24 | Resolved: 2021-08-17

## 2021-08-17 PROBLEM — M72.2 PLANTAR FASCIITIS: Status: RESOLVED | Noted: 2020-12-29 | Resolved: 2021-08-17

## 2021-08-17 PROBLEM — Z12.31 ENCOUNTER FOR SCREENING MAMMOGRAM FOR MALIGNANT NEOPLASM OF BREAST: Status: RESOLVED | Noted: 2020-12-29 | Resolved: 2021-08-17

## 2021-08-17 PROBLEM — Z00.01 ENCOUNTER FOR GENERAL ADULT MEDICAL EXAMINATION WITH ABNORMAL FINDINGS: Status: RESOLVED | Noted: 2020-04-13 | Resolved: 2021-08-17

## 2021-08-17 PROCEDURE — 1036F TOBACCO NON-USER: CPT | Performed by: PHYSICIAN ASSISTANT

## 2021-08-17 PROCEDURE — 3008F BODY MASS INDEX DOCD: CPT | Performed by: PHYSICIAN ASSISTANT

## 2021-08-17 PROCEDURE — 73562 X-RAY EXAM OF KNEE 3: CPT

## 2021-08-17 PROCEDURE — 99214 OFFICE O/P EST MOD 30 MIN: CPT | Performed by: PHYSICIAN ASSISTANT

## 2021-08-17 NOTE — ASSESSMENT & PLAN NOTE
No history of R knee problems  R knee "creaky"  No crepitus on exam, however "creaking" appreciated  She is able to bear weight and walk normally  R knee XR  Recommend use of ice, voltaren gel and Tylenol as needed for pain

## 2021-08-17 NOTE — ASSESSMENT & PLAN NOTE
No inciting event/injury  She has chronic back pain and sees a chiropractor for this  He adjusted her back recently but it only helped her pain momentarily  ROM limited due to pain  She reports that she wears 3-inch high heels 5 days per week  She has not tried any medications  She cannot take NSAIDs since she only has one kidney (congenital)  Back feels better now that she is wearing flat shoes  Recommended to apply heat to the back twice daily and continue to wear flat shoes  Comprehensive spine PT referral given  Voltaren gel prescribed

## 2021-08-17 NOTE — PROGRESS NOTES
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)  Back feels better now that she is wearing flat shoes  Recommended to apply heat to the back twice daily and continue to wear flat shoes  Comprehensive spine PT referral given  Voltaren gel prescribed  Acute pain of right knee  No history of R knee problems  R knee "creaky"  No crepitus on exam, however "creaking" appreciated  She is able to bear weight and walk normally  R knee XR  Recommend use of ice, voltaren gel and Tylenol as needed for pain  Diagnoses and all orders for this visit:    Acute bilateral low back pain with right-sided sciatica  -     Ambulatory Referral to Comprehensive Spine Program; Future  -     Diclofenac Sodium (VOLTAREN) 1 %; Apply 2 g topically 4 (four) times a day    Acute pain of right knee  -     XR knee 3 vw right non injury; Future    Other orders  -     Cancel: POCT urine dip          Subjective:      Patient ID: Zach Westbrook is a 48 y o  female  Torres Paddy presents to the office today for evaluation of low back pain and R knee pain x 3 days  The following portions of the patient's history were reviewed and updated as appropriate: allergies, current medications, past family history, past medical history, past social history, past surgical history and problem list     Review of Systems   Constitutional: Negative for chills, fatigue and fever  HENT: Negative for congestion, rhinorrhea, sinus pressure, sinus pain, sneezing and sore throat  Respiratory: Negative for cough, shortness of breath and wheezing      Cardiovascular: Negative for chest pain, palpitations and leg swelling  Gastrointestinal: Negative for abdominal pain, constipation, diarrhea, nausea and vomiting  Genitourinary: Negative for dysuria, flank pain and hematuria  Musculoskeletal: Positive for arthralgias (R knee) and back pain  Negative for gait problem, joint swelling, myalgias, neck pain and neck stiffness  Neurological: Negative for dizziness, seizures and headaches  Objective:      /82 (BP Location: Left arm, Patient Position: Sitting, Cuff Size: Standard)   Pulse 96   Temp 97 7 °F (36 5 °C) (Temporal)   Resp 20   Ht 4' 10" (1 473 m)   Wt 79 9 kg (176 lb 3 2 oz)   SpO2 98%   BMI 36 83 kg/m²          Physical Exam  Vitals reviewed  Constitutional:       General: She is not in acute distress  Appearance: Normal appearance  She is not toxic-appearing  HENT:      Head: Normocephalic and atraumatic  Eyes:      Extraocular Movements: Extraocular movements intact  Conjunctiva/sclera: Conjunctivae normal    Cardiovascular:      Rate and Rhythm: Normal rate and regular rhythm  Pulses: Normal pulses  Heart sounds: Normal heart sounds  No murmur heard  No gallop  Pulmonary:      Effort: Pulmonary effort is normal  No respiratory distress  Breath sounds: Normal breath sounds  No stridor  No wheezing, rhonchi or rales  Musculoskeletal:      Cervical back: Normal       Thoracic back: Normal       Lumbar back: Tenderness present  No swelling, deformity, signs of trauma, lacerations, spasms or bony tenderness  Decreased range of motion  Right knee: No effusion, erythema, ecchymosis or crepitus  Normal range of motion  Tenderness present  No LCL laxity or MCL laxity  Normal pulse  Left knee: Normal       Right lower leg: No edema  Left lower leg: No edema  Skin:     General: Skin is warm and dry  Neurological:      Mental Status: She is alert and oriented to person, place, and time        Gait: Gait normal  Psychiatric:         Mood and Affect: Mood normal          Behavior: Behavior normal          Thought Content:  Thought content normal

## 2021-08-19 ENCOUNTER — TELEPHONE (OUTPATIENT)
Dept: FAMILY MEDICINE CLINIC | Facility: CLINIC | Age: 51
End: 2021-08-19

## 2021-08-25 ENCOUNTER — EVALUATION (OUTPATIENT)
Dept: PHYSICAL THERAPY | Facility: CLINIC | Age: 51
End: 2021-08-25
Payer: COMMERCIAL

## 2021-08-25 DIAGNOSIS — M54.41 ACUTE BACK PAIN WITH SCIATICA, RIGHT: Primary | ICD-10-CM

## 2021-08-25 PROCEDURE — 97162 PT EVAL MOD COMPLEX 30 MIN: CPT

## 2021-08-25 NOTE — PROGRESS NOTES
PT Evaluation     Today's date: 2021  Patient name: Zach Westbrook  : 1970  MRN: [id number]  Referring provider: Ja Pérez PA-C  Dx:   Encounter Diagnosis     ICD-10-CM    1  Acute back pain with sciatica, right  M54 41                   Assessment  Assessment details: Zach Westbrook is a 48 y o  female who presents today to outpatient therapy for evaluation of acute back pain with sciatica, right  Upon assessment today, pt exhibits decreased/painful L/S AROM; decreased SL stability; adverse neural tension thru the (R) sciatic nerve; and TTP throughout the lower back  These impairments are contributing to functional limitations with bending and walking with heels on  Pt would therefore benefit from PT intervention in order to address the aforementioned deficits so that she can return to her PLOF and function comfortably/safely in her home and surrounding environment  Thank you for the referral! - Thomas Medellin, PT, DPT  Impairments: abnormal or restricted ROM, abnormal movement, impaired balance, impaired physical strength, pain with function and poor posture     Symptom irritability: lowUnderstanding of Dx/Px/POC: good   Prognosis: good    Goals  STG 1: Pt will demonstrate compliance w/ HEP to supplement therapy in 1-2 weeks  STG 2: Pt will report 25% reduction in pain in 2-4 weeks  LTG 1: Pt will verbalize an improved awareness of bending mechanics in 6-8 weeks  LTG 2: Pt will be able to walk daily while wearing heels with min to no increased symptoms in 6-8 weeks  Plan  Plan details: Educated pt today about goals of therapy; role of core stab; trigger points; prognosis; and role of glutes  Also advised pt to supplement PT sessions weekly with exercises since she will be attending therapy 1x/week    Patient would benefit from: skilled physical therapy  Planned modality interventions: cryotherapy, TENS, traction and unattended electrical stimulation  Planned therapy interventions: abdominal trunk stabilization, self care, postural training, patient education, neuromuscular re-education, joint mobilization, manual therapy, massage, activity modification, balance, body mechanics training, breathing training, Gibbs taping, strengthening, stretching, therapeutic activities, coordination, flexibility, home exercise program, therapeutic exercise and functional ROM exercises  Frequency: 1x week  Duration in weeks: 4  Treatment plan discussed with: patient        Subjective Evaluation    History of Present Illness  Mechanism of injury: Pt reports a hx of arthritis in the lower back  Some days, the pain hurts more than others  She now only wears high heels 1x/week because she notices increased LBP when wearing heels  Last week, she fell down the stairs and this further aggravated her back pain  When she f/u with her doctor most recently, she said that her pain is related to arthritis  Currently, pt reports difficulty bending forward and walking with heels  Pt adds that she has also been regularly going to a chiropractor for her back about 1x/week  Pt reports an MD f/u appt in October  Eases: Pain cream  Pain  Current pain rating: 3  At best pain ratin  At worst pain ratin  Location: Pt reports pain across the lower back and sacrum  Pt also reports N/T thru the (R) calf  Patient Goals  Patient goal: Pt would like to be able to wear her heels to work every day  Objective     Postural Observations    Additional Postural Observation Details  Increased anterior pelvic tilt    Palpation     Additional Palpation Details  Mod-severe TTP thru (L) QL, iliac crest, L/S PS; mod TTP thru (R) QL, iliac crest, L/S PS  LTG: Min TTP       Active Range of Motion     Lumbar   Flexion:  with pain Restriction level: moderate  Extension:  with pain Restriction level: maximal  Left lateral flexion:  WFL  Right lateral flexion:  WFL  Left rotation:  WFL  Right rotation:  [facility] and with pain    Additional Active Range of Motion Details  LE flexibility TBA  Strength/Myotome Testing     Left Hip   Planes of Motion   Flexion: 5    Right Hip   Planes of Motion   Flexion: 5    Left Knee   Flexion: 5  Extension: 5    Right Knee   Flexion: 5  Extension: 5    Additional Strength Details  Glute med strength TBA  Tests     Lumbar     Left   Negative slump test      Right   Positive slump test      General Comments:      Lumbar Comments  (R) SLS: 15 secs, min sway, required multiple attempts  (L) SLS: 15 secs, min sway, required multiple attempts, increased lateral trunk lean to the (L)  Unable to assess in supine position this AM as pt was wearing a skirt  Assess LE flexibility and glute med strength at a later time  Precautions: Congenital absence of (L) kidney; hx hysterectomy; (L) meniscus surgery about 3 years ago    Date 8/25            FOTO IE            Re-Eval IE                Manuals 8/25                                                                Neuro Re-Ed 8/25            TVA Contractions nv            BKFO + TVA nv            Supine March+TVA nv            SLR + TVA             Slump nerve glides nv            Mod side plank*             Mod front plank*             Standing hip hike nv            SLS on foam             Palloff press             Uni tband rows/etx                          Ther Ex 8/25            Gastroc (S) on wedge nv            Standing hip abd, ext             CHELSIE nv            Seated T/S ext over ball nv            Seated HS (S)* assess nv            Piriformis (S) * assess nv            LTRs             Bridges nv            Clams nv            SL Hip abd             ALBA                                                    Ther Activity 8/25            TM walking nv            Mini squats nv            Sidesteps             Monster walks             Standing hip hinge                          Gait Training 8/25 Modalities 8/25            Prn

## 2021-09-01 ENCOUNTER — OFFICE VISIT (OUTPATIENT)
Dept: PHYSICAL THERAPY | Facility: CLINIC | Age: 51
End: 2021-09-01
Payer: COMMERCIAL

## 2021-09-01 DIAGNOSIS — M54.41 ACUTE BACK PAIN WITH SCIATICA, RIGHT: Primary | ICD-10-CM

## 2021-09-01 PROCEDURE — 97530 THERAPEUTIC ACTIVITIES: CPT

## 2021-09-01 PROCEDURE — 97110 THERAPEUTIC EXERCISES: CPT

## 2021-09-01 NOTE — PROGRESS NOTES
Daily Note     Today's date: 2021  Patient name: Nico Linder  : 1970  MRN: [id number]  Referring provider: Steph Romo PA-C  Dx:   Encounter Diagnosis     ICD-10-CM    1  Acute back pain with sciatica, right  M54 41        Start Time: 1205  Stop Time: 1250  Total time in clinic (min): 45 minutes  Subjective: Pt arrives to first f/u reporting her LBP feels "not too bad today" - denies radicular Sx present today  Pt reports las occurrence of radicular Sx on Sat () - notes no specific triggers of pain Sx  Pt has to return to work by [phone]  Objective: See treatment diary below    Assessment: Implemented exercise diary per PT POC as indicated below  Provided pt education re: PT POC, progression of program as tolerated  Provided pt /c HEP - pt notes appropriate level of challenge /c current program - discussed progressing each visit as tolerated  PT would benefit from continued PT  Plan: Cont /c PT POC  Progress as tolerated  Precautions: Congenital absence of (L) kidney; hx hysterectomy; (L) meniscus surgery about 3 years ago    Date            FOTO IE            Re-Eval IE                Manuals                                                                Neuro Re-Ed            TVA Contractions nv            BKFO + TVA nv            Supine March+TVA nv            SLR + TVA             Slump nerve glides nv            Mod side plank*             Mod front plank*             Standing hip hike nv            SLS on foam             Palloff press             Uni tband rows/etx                          Ther Ex            Gastroc (S) on wedge nv 20"x5           Standing hip abd, ext             CHELSIE nv            Seated T/S ext over ball nv            Seated HS (S)* assess nv 30"x3ea           Piriformis (S) * assess nv 30"x3ea           LTRs             Bridges nv 2x10           Clams nv 10ea           SL Hip abd             ALBA Pt educ  SP                        Ther Activity 8/25 09/01           TM walking nv 10' mph           Mini squats nv            Sidesteps             Monster walks             Standing hip hinge             Pt education  SP                                     Gait Training 8/25 09/01                                     Modalities 8/25 09/01           Clemencia Lopez, PTA

## 2021-09-08 ENCOUNTER — OFFICE VISIT (OUTPATIENT)
Dept: PHYSICAL THERAPY | Facility: CLINIC | Age: 51
End: 2021-09-08
Payer: COMMERCIAL

## 2021-09-08 DIAGNOSIS — M54.41 ACUTE BACK PAIN WITH SCIATICA, RIGHT: Primary | ICD-10-CM

## 2021-09-08 PROCEDURE — 97112 NEUROMUSCULAR REEDUCATION: CPT

## 2021-09-08 PROCEDURE — 97110 THERAPEUTIC EXERCISES: CPT

## 2021-09-08 PROCEDURE — 97530 THERAPEUTIC ACTIVITIES: CPT

## 2021-09-08 NOTE — PROGRESS NOTES
Daily Note     Today's date: 2021  Patient name: Jose Raul Greenfield  : 1970  MRN: [id number]  Referring provider: Filemon Llanes PA-C  Dx:   Encounter Diagnosis     ICD-10-CM    1  Acute back pain with sciatica, right  M54 41                   Subjective: Pt states that her back felt a little better following initial exercises LV  Pt states that today, it is more painful because she has been sitting all morning at work  Objective: See treatment diary below      Assessment: Tolerated treatment well  Trialed numerous verbal cues during TVA activation however pt still had dificulty coordinating a strong TVA contraction  Therefore modified exercise and performed with SWB instead with improved form demonstrated  Pt remains challenged by clamshell exercise d/t fatigue  She completed addition of CHELSIE w/out c/o pain therefore provided pt with updated HEP consisting of this exercise and encouraged pt to perform this exercise proactively and reactively daily to modulate symptoms  Educated pt today about TVA anatomy/function and activation of TVA as well as goals of repeated movements  Patient demonstrated fatigue post treatment, exhibited good technique with therapeutic exercises and would benefit from continued PT to progress core stab and glute strength to improve pt's tolerance to sitting/standing prolonged periods of time  Plan: Continue per plan of care  Progress treatment as tolerated  Precautions: Congenital absence of (L) kidney; hx hysterectomy; (L) meniscus surgery about 3 years ago    Date           FOTO IE            Re-Eval IE                Manuals                                                               Neuro Re-Ed           TVA Contractions nv  trialed hold          Iso TVA /c SWB   15x5"          BKFO + TVA nv            Supine March+TVA nv            SLR + TVA             Slump nerve glides nv  3x10 APs ea          Mod side plank* Mod front plank*             Standing hip hike nv            SLS on foam             Palloff press  nv           Uni tband rows/etx                          Ther Ex 8/25 09/01 9/8          Gastroc (S) on wedge nv 20"x5 nv          Standing hip abd, ext             CHELSIE nv  2x10          Seated T/S ext over ball nv            Seated HS (S)* assess nv 30"x3ea 3x30"           Piriformis (S) * assess nv 30"x3ea 3x30" ea          LTRs             Bridges nv 2x10 2x10          Clams nv 10ea 15x ea          SL Hip abd             ALBA                          Pt educ  SP                        Ther Activity 8/25 09/01 9/8          TM walking nv 10' mph 10' mph          Mini squats nv            Sidesteps             Monster walks             Standing hip hinge             Pt education  SP AL                                    Gait Training 8/25 09/01 9/8                                    Modalities 8/25 09/01 9/8          Prn

## 2021-09-15 ENCOUNTER — APPOINTMENT (OUTPATIENT)
Dept: PHYSICAL THERAPY | Facility: CLINIC | Age: 51
End: 2021-09-15
Payer: COMMERCIAL

## 2021-09-22 ENCOUNTER — OFFICE VISIT (OUTPATIENT)
Dept: PHYSICAL THERAPY | Facility: CLINIC | Age: 51
End: 2021-09-22
Payer: COMMERCIAL

## 2021-09-22 DIAGNOSIS — M54.41 ACUTE BACK PAIN WITH SCIATICA, RIGHT: Primary | ICD-10-CM

## 2021-09-22 PROCEDURE — 97110 THERAPEUTIC EXERCISES: CPT

## 2021-09-22 PROCEDURE — 97530 THERAPEUTIC ACTIVITIES: CPT

## 2021-09-22 PROCEDURE — 97112 NEUROMUSCULAR REEDUCATION: CPT

## 2021-09-22 NOTE — PROGRESS NOTES
Daily Note     Today's date: 2021  Patient name: Vasu Mercado  : 1970  MRN: [id number]  Referring provider: Jeremiah Goff PA-C  Dx:   Encounter Diagnosis     ICD-10-CM    1  Acute back pain with sciatica, right  M54 41                   Subjective: Pt states that she is feeling "good" and denies any pain thru the LB  She cannot remember the last time she had pain, whether it was a couple days or a couple of weeks ago  Pt reports compliance with CHELSIE at home, stating that they "really help "       Objective: See treatment diary below      Assessment: Tolerated treatment well  Pt is progressing well with therapy to date, reporting improved symptoms  Pt challenged with exercise additions this AM, particularly with sidestepping d/t fatigue  Discussed POC to potentially d/c in a couple of weeks pending symptoms remain under control - pt verbalized understanding  Also educated pt today about DOMs following new exercises and role of core stab  Patient demonstrated fatigue post treatment, exhibited good technique with therapeutic exercises and would benefit from continued PT to progress core stab and LE strength to improve pt's tolerance to walking prolonged periods of time daily  Plan: Continue per plan of care  Progress treatment as tolerated  Precautions: Congenital absence of (L) kidney; hx hysterectomy; (L) meniscus surgery about 3 years ago    Date          FOTO IE            Re-Eval IE                Manuals                                                              Neuro Re-Ed          TVA Contractions nv  trialed hold          Iso TVA /c SWB   15x5"          BKFO + TVA nv            Supine March+TVA nv            SLR + TVA             Slump nerve glides nv  3x10 APs ea          Mod side plank*             Mod front plank*             Standing hip hike nv            SLS on foam    2x20"          Palloff press  nv  RTB 10x5" ea         Uni tband rows/ext    RTB 20x ea                       Ther Ex 8/25 09/01 9/8 9/22         DL leg press    Seat 2 65# x10, 75# 2x10         Gastroc (S) on wedge nv 20"x5 nv 5x20"         CHELSIE nv  2x10 2x10         Seated T/S ext over ball nv            Seated HS (S)* assess nv 30"x3ea 3x30"           Piriformis (S) * assess nv 30"x3ea 3x30" ea          Bridges nv 2x10 2x10          Clams nv 10ea 15x ea          SL Hip abd             ALBA             Pt educ  SP  AL - DOMs                      Ther Activity 8/25 09/01 9/8 9/22         TM walking nv 10' mph 10' mph 10'          Mini squats nv            Sidesteps    GTB 2 laps         Monster walks             Standing hip hinge             Pt education  SP AL AL - POC                                   Gait Training 8/25 09/01 9/8 9/22                                   Modalities 8/25 09/01 9/8 9/22         Prn

## 2021-09-23 ENCOUNTER — RA CDI HCC (OUTPATIENT)
Dept: OTHER | Facility: HOSPITAL | Age: 51
End: 2021-09-23

## 2021-09-24 NOTE — PROGRESS NOTES
[facility]  coding opportunities       Chart reviewed, no opportunity found: CHART REVIEWED, NO OPPORTUNITY FOUND                        Patients insurance company: [employer] (Med[employer] and Commercial)

## 2021-10-01 ENCOUNTER — OFFICE VISIT (OUTPATIENT)
Dept: PHYSICAL THERAPY | Facility: CLINIC | Age: 51
End: 2021-10-01
Payer: COMMERCIAL

## 2021-10-01 DIAGNOSIS — M54.41 ACUTE BACK PAIN WITH SCIATICA, RIGHT: Primary | ICD-10-CM

## 2021-10-01 PROCEDURE — 97140 MANUAL THERAPY 1/> REGIONS: CPT

## 2021-10-01 PROCEDURE — 97530 THERAPEUTIC ACTIVITIES: CPT

## 2021-10-01 PROCEDURE — 97110 THERAPEUTIC EXERCISES: CPT

## 2021-10-04 DIAGNOSIS — Z20.822 EXPOSURE TO CONFIRMED CASE OF COVID-19: Primary | ICD-10-CM

## 2021-10-06 ENCOUNTER — APPOINTMENT (OUTPATIENT)
Dept: PHYSICAL THERAPY | Facility: CLINIC | Age: 51
End: 2021-10-06
Payer: COMMERCIAL

## 2021-10-11 ENCOUNTER — HOSPITAL ENCOUNTER (EMERGENCY)
Facility: HOSPITAL | Age: 51
Discharge: HOME | End: 2021-10-12
Attending: EMERGENCY MEDICINE
Payer: COMMERCIAL

## 2021-10-11 ENCOUNTER — APPOINTMENT (EMERGENCY)
Dept: RADIOLOGY | Facility: HOSPITAL | Age: 51
End: 2021-10-11
Payer: COMMERCIAL

## 2021-10-11 DIAGNOSIS — R07.9 CHEST PAIN, UNSPECIFIED: Primary | ICD-10-CM

## 2021-10-11 LAB
ALBUMIN SERPL BCP-MCNC: 3.5 G/DL (ref 3.5–5)
ALP SERPL-CCNC: 75 U/L (ref 46–116)
ALT SERPL W P-5'-P-CCNC: 28 U/L (ref 12–78)
ANION GAP SERPL CALCULATED.3IONS-SCNC: 5 MMOL/L (ref 4–13)
APTT PPP: 32 SECONDS (ref 23–37)
AST SERPL W P-5'-P-CCNC: 16 U/L (ref 5–45)
BASOPHILS # BLD AUTO: 0.06 THOUSANDS/ΜL (ref 0–0.1)
BASOPHILS NFR BLD AUTO: 1 % (ref 0–1)
BILIRUB SERPL-MCNC: 0.18 MG/DL (ref 0.2–1)
BUN SERPL-MCNC: 13 MG/DL (ref 5–25)
CALCIUM SERPL-MCNC: 8.9 MG/DL (ref 8.3–10.1)
CHLORIDE SERPL-SCNC: 105 MMOL/L (ref 100–108)
CO2 SERPL-SCNC: 30 MMOL/L (ref 21–32)
CREAT SERPL-MCNC: 0.94 MG/DL (ref 0.6–1.3)
D DIMER PPP FEU-MCNC: 0.48 UG/ML FEU
EOSINOPHIL # BLD AUTO: 0.82 THOUSAND/ΜL (ref 0–0.61)
EOSINOPHIL NFR BLD AUTO: 11 % (ref 0–6)
ERYTHROCYTE [DISTWIDTH] IN BLOOD BY AUTOMATED COUNT: 12.9 % (ref 11.6–15.1)
GFR SERPL CREATININE-BSD FRML MDRD: 70 ML/MIN/1.73SQ M
GLUCOSE SERPL-MCNC: 107 MG/DL (ref 65–140)
HCT VFR BLD AUTO: 40.9 % (ref 34.8–46.1)
HGB BLD-MCNC: 13.1 G/DL (ref 11.5–15.4)
IMM GRANULOCYTES # BLD AUTO: 0.02 THOUSAND/UL (ref 0–0.2)
IMM GRANULOCYTES NFR BLD AUTO: 0 % (ref 0–2)
INR PPP: 0.96 (ref 0.84–1.19)
LIPASE SERPL-CCNC: 72 U/L (ref 73–393)
LYMPHOCYTES # BLD AUTO: 1.96 THOUSANDS/ΜL (ref 0.6–4.47)
LYMPHOCYTES NFR BLD AUTO: 26 % (ref 14–44)
MCH RBC QN AUTO: 27.8 PG (ref 26.8–34.3)
MCHC RBC AUTO-ENTMCNC: 32 G/DL (ref 31.4–37.4)
MCV RBC AUTO: 87 FL (ref 82–98)
MONOCYTES # BLD AUTO: 0.59 THOUSAND/ΜL (ref 0.17–1.22)
MONOCYTES NFR BLD AUTO: 8 % (ref 4–12)
NEUTROPHILS # BLD AUTO: 4.18 THOUSANDS/ΜL (ref 1.85–7.62)
NEUTS SEG NFR BLD AUTO: 54 % (ref 43–75)
NRBC BLD AUTO-RTO: 0 /100 WBCS
PLATELET # BLD AUTO: 257 THOUSANDS/UL (ref 149–390)
PMV BLD AUTO: 8.8 FL (ref 8.9–12.7)
POTASSIUM SERPL-SCNC: 4.4 MMOL/L (ref 3.5–5.3)
PROT SERPL-MCNC: 7.1 G/DL (ref 6.4–8.2)
PROTHROMBIN TIME: 12.8 SECONDS (ref 11.6–14.5)
RBC # BLD AUTO: 4.71 MILLION/UL (ref 3.81–5.12)
SODIUM SERPL-SCNC: 140 MMOL/L (ref 136–145)
TROPONIN I SERPL-MCNC: <0.02 NG/ML
WBC # BLD AUTO: 7.63 THOUSAND/UL (ref 4.31–10.16)

## 2021-10-11 PROCEDURE — 71045 X-RAY EXAM CHEST 1 VIEW: CPT

## 2021-10-11 PROCEDURE — 83690 ASSAY OF LIPASE: CPT | Performed by: PHYSICIAN ASSISTANT

## 2021-10-11 PROCEDURE — 85025 COMPLETE CBC W/AUTO DIFF WBC: CPT | Performed by: EMERGENCY MEDICINE

## 2021-10-11 PROCEDURE — 85730 THROMBOPLASTIN TIME PARTIAL: CPT | Performed by: EMERGENCY MEDICINE

## 2021-10-11 PROCEDURE — 84484 ASSAY OF TROPONIN QUANT: CPT | Performed by: PHYSICIAN ASSISTANT

## 2021-10-11 PROCEDURE — 36415 COLL VENOUS BLD VENIPUNCTURE: CPT

## 2021-10-11 PROCEDURE — 93005 ELECTROCARDIOGRAM TRACING: CPT

## 2021-10-11 PROCEDURE — 85379 FIBRIN DEGRADATION QUANT: CPT | Performed by: EMERGENCY MEDICINE

## 2021-10-11 PROCEDURE — 80053 COMPREHEN METABOLIC PANEL: CPT | Performed by: EMERGENCY MEDICINE

## 2021-10-11 PROCEDURE — 99285 EMERGENCY DEPT VISIT HI MDM: CPT

## 2021-10-11 PROCEDURE — 99285 EMERGENCY DEPT VISIT HI MDM: CPT | Performed by: PHYSICIAN ASSISTANT

## 2021-10-11 PROCEDURE — 85610 PROTHROMBIN TIME: CPT | Performed by: EMERGENCY MEDICINE

## 2021-10-11 PROCEDURE — 84484 ASSAY OF TROPONIN QUANT: CPT | Performed by: EMERGENCY MEDICINE

## 2021-10-11 RX ORDER — ACETAMINOPHEN 325 MG/1
650 TABLET ORAL ONCE
Status: COMPLETED | OUTPATIENT
Start: 2021-10-11 | End: 2021-10-11

## 2021-10-11 RX ORDER — MAGNESIUM HYDROXIDE/ALUMINUM HYDROXICE/SIMETHICONE 120; 1200; 1200 MG/30ML; MG/30ML; MG/30ML
30 SUSPENSION ORAL ONCE
Status: COMPLETED | OUTPATIENT
Start: 2021-10-11 | End: 2021-10-11

## 2021-10-11 RX ORDER — KETOROLAC TROMETHAMINE 30 MG/ML
15 INJECTION, SOLUTION INTRAMUSCULAR; INTRAVENOUS ONCE
Status: DISCONTINUED | OUTPATIENT
Start: 2021-10-11 | End: 2021-10-11

## 2021-10-11 RX ORDER — LIDOCAINE HYDROCHLORIDE 20 MG/ML
15 SOLUTION OROPHARYNGEAL ONCE
Status: COMPLETED | OUTPATIENT
Start: 2021-10-11 | End: 2021-10-11

## 2021-10-11 RX ADMIN — LIDOCAINE HYDROCHLORIDE 15 ML: 20 SOLUTION ORAL; TOPICAL at 22:03

## 2021-10-11 RX ADMIN — ALUMINA, MAGNESIA, AND SIMETHICONE ORAL SUSPENSION REGULAR STRENGTH 30 ML: 1200; 1200; 120 SUSPENSION ORAL at 22:03

## 2021-10-11 RX ADMIN — ACETAMINOPHEN 650 MG: 325 TABLET, FILM COATED ORAL at 22:02

## 2021-10-12 ENCOUNTER — TRANSITIONAL CARE MANAGEMENT (OUTPATIENT)
Dept: FAMILY MEDICINE CLINIC | Facility: CLINIC | Age: 51
End: 2021-10-12

## 2021-10-12 VITALS
DIASTOLIC BLOOD PRESSURE: 72 MMHG | TEMPERATURE: 98.1 F | RESPIRATION RATE: 18 BRPM | HEART RATE: 64 BPM | SYSTOLIC BLOOD PRESSURE: 123 MMHG | OXYGEN SATURATION: 94 %

## 2021-10-12 LAB — TROPONIN I SERPL-MCNC: <0.02 NG/ML

## 2021-10-13 LAB
ATRIAL RATE: 67 BPM
ATRIAL RATE: 68 BPM
P AXIS: 67 DEGREES
P AXIS: 69 DEGREES
PR INTERVAL: 108 MS
PR INTERVAL: 116 MS
QRS AXIS: 84 DEGREES
QRS AXIS: 87 DEGREES
QRSD INTERVAL: 82 MS
QRSD INTERVAL: 82 MS
QT INTERVAL: 384 MS
QT INTERVAL: 384 MS
QTC INTERVAL: 405 MS
QTC INTERVAL: 408 MS
T WAVE AXIS: 11 DEGREES
T WAVE AXIS: 6 DEGREES
VENTRICULAR RATE: 67 BPM
VENTRICULAR RATE: 68 BPM

## 2021-10-13 PROCEDURE — 93010 ELECTROCARDIOGRAM REPORT: CPT | Performed by: INTERNAL MEDICINE

## 2021-10-15 ENCOUNTER — TELEPHONE (OUTPATIENT)
Dept: FAMILY MEDICINE CLINIC | Facility: CLINIC | Age: 51
End: 2021-10-15

## 2021-10-18 ENCOUNTER — OFFICE VISIT (OUTPATIENT)
Dept: FAMILY MEDICINE CLINIC | Facility: CLINIC | Age: 51
End: 2021-10-18
Payer: COMMERCIAL

## 2021-10-18 VITALS
OXYGEN SATURATION: 99 % | RESPIRATION RATE: 16 BRPM | SYSTOLIC BLOOD PRESSURE: 122 MMHG | TEMPERATURE: 97.8 F | DIASTOLIC BLOOD PRESSURE: 81 MMHG | WEIGHT: 176 LBS | HEART RATE: 90 BPM | BODY MASS INDEX: 36.94 KG/M2 | HEIGHT: 58 IN

## 2021-10-18 DIAGNOSIS — R07.9 CHEST PAIN, UNSPECIFIED TYPE: Primary | ICD-10-CM

## 2021-10-18 PROCEDURE — 3008F BODY MASS INDEX DOCD: CPT | Performed by: NURSE PRACTITIONER

## 2021-10-18 PROCEDURE — 99214 OFFICE O/P EST MOD 30 MIN: CPT | Performed by: NURSE PRACTITIONER

## 2021-10-18 PROCEDURE — 1036F TOBACCO NON-USER: CPT | Performed by: NURSE PRACTITIONER

## 2021-10-20 ENCOUNTER — APPOINTMENT (OUTPATIENT)
Dept: PHYSICAL THERAPY | Facility: CLINIC | Age: 51
End: 2021-10-20
Payer: COMMERCIAL

## 2021-11-09 ENCOUNTER — OFFICE VISIT (OUTPATIENT)
Dept: FAMILY MEDICINE CLINIC | Facility: CLINIC | Age: 51
End: 2021-11-09
Payer: COMMERCIAL

## 2021-11-09 ENCOUNTER — APPOINTMENT (OUTPATIENT)
Dept: LAB | Facility: HOSPITAL | Age: 51
End: 2021-11-09
Payer: COMMERCIAL

## 2021-11-09 VITALS
BODY MASS INDEX: 36.82 KG/M2 | RESPIRATION RATE: 20 BRPM | HEIGHT: 58 IN | DIASTOLIC BLOOD PRESSURE: 78 MMHG | HEART RATE: 99 BPM | WEIGHT: 175.4 LBS | SYSTOLIC BLOOD PRESSURE: 120 MMHG | OXYGEN SATURATION: 98 % | TEMPERATURE: 96.5 F

## 2021-11-09 DIAGNOSIS — J45.20 MILD INTERMITTENT ASTHMA WITHOUT COMPLICATION: ICD-10-CM

## 2021-11-09 DIAGNOSIS — M25.561 ACUTE PAIN OF RIGHT KNEE: Primary | ICD-10-CM

## 2021-11-09 DIAGNOSIS — R53.83 OTHER FATIGUE: ICD-10-CM

## 2021-11-09 PROBLEM — J45.21 MILD INTERMITTENT ASTHMA WITH ACUTE EXACERBATION: Status: ACTIVE | Noted: 2021-11-09

## 2021-11-09 LAB
ERYTHROCYTE [DISTWIDTH] IN BLOOD BY AUTOMATED COUNT: 13 %
HCT VFR BLD AUTO: 39.4 % (ref 36–46)
HGB BLD-MCNC: 12.8 G/DL (ref 12–16)
MCH RBC QN AUTO: 27.4 PG (ref 26–34)
MCHC RBC AUTO-ENTMCNC: 32.4 G/DL (ref 31–36)
MCV RBC AUTO: 84 FL (ref 80–100)
PLATELET # BLD AUTO: 262 THOUSANDS/UL (ref 150–450)
PMV BLD AUTO: 7.4 FL (ref 8.9–12.7)
RBC # BLD AUTO: 4.68 MILLION/UL (ref 4–5.2)
WBC # BLD AUTO: 7.1 THOUSAND/UL (ref 4.5–11)

## 2021-11-09 PROCEDURE — 85027 COMPLETE CBC AUTOMATED: CPT

## 2021-11-09 PROCEDURE — 99214 OFFICE O/P EST MOD 30 MIN: CPT | Performed by: PHYSICIAN ASSISTANT

## 2021-11-09 PROCEDURE — 36415 COLL VENOUS BLD VENIPUNCTURE: CPT

## 2021-11-18 ENCOUNTER — OFFICE VISIT (OUTPATIENT)
Dept: FAMILY MEDICINE CLINIC | Facility: CLINIC | Age: 51
End: 2021-11-18
Payer: COMMERCIAL

## 2021-11-18 VITALS
RESPIRATION RATE: 20 BRPM | SYSTOLIC BLOOD PRESSURE: 122 MMHG | DIASTOLIC BLOOD PRESSURE: 82 MMHG | TEMPERATURE: 96.1 F | BODY MASS INDEX: 36.98 KG/M2 | OXYGEN SATURATION: 99 % | WEIGHT: 176.2 LBS | HEIGHT: 58 IN | HEART RATE: 88 BPM

## 2021-11-18 DIAGNOSIS — R73.03 PREDIABETES: ICD-10-CM

## 2021-11-18 DIAGNOSIS — E78.2 MODERATE MIXED HYPERLIPIDEMIA NOT REQUIRING STATIN THERAPY: ICD-10-CM

## 2021-11-18 DIAGNOSIS — Z00.00 ANNUAL PHYSICAL EXAM: Primary | ICD-10-CM

## 2021-11-18 DIAGNOSIS — Z23 NEED FOR VACCINATION: ICD-10-CM

## 2021-11-18 PROBLEM — R07.9 CHEST PAIN: Status: RESOLVED | Noted: 2021-10-18 | Resolved: 2021-11-18

## 2021-11-18 PROCEDURE — 99396 PREV VISIT EST AGE 40-64: CPT | Performed by: PHYSICIAN ASSISTANT

## 2021-11-18 PROCEDURE — 1036F TOBACCO NON-USER: CPT | Performed by: PHYSICIAN ASSISTANT

## 2021-11-18 PROCEDURE — 3008F BODY MASS INDEX DOCD: CPT | Performed by: PHYSICIAN ASSISTANT

## 2021-12-06 DIAGNOSIS — N95.1 MENOPAUSAL SYMPTOM: ICD-10-CM

## 2021-12-06 RX ORDER — ESTRADIOL 2 MG/1
TABLET ORAL
Qty: 90 TABLET | Refills: 2 | Status: SHIPPED | OUTPATIENT
Start: 2021-12-06

## 2022-05-17 ENCOUNTER — TELEMEDICINE (OUTPATIENT)
Dept: FAMILY MEDICINE CLINIC | Facility: CLINIC | Age: 52
End: 2022-05-17
Payer: COMMERCIAL

## 2022-05-17 DIAGNOSIS — U07.1 COVID-19 VIRUS INFECTION: Primary | ICD-10-CM

## 2022-05-17 PROCEDURE — 99213 OFFICE O/P EST LOW 20 MIN: CPT | Performed by: FAMILY MEDICINE

## 2022-05-17 NOTE — PROGRESS NOTES
COVID-19 Outpatient Progress Note    Assessment/Plan:  Expected to complete 5 days of isolation and finish Paxlovid  She will be released to work on 05/20/2022  Problem List Items Addressed This Visit    None     Visit Diagnoses     COVID-19 virus infection    -  Primary         Disposition:     Risks and benefits of COVID-19 vaccination was discussed with patient  I recommended continued isolation until at least 24 hours have passed since recovery defined as resolution of fever without the use of fever-reducing medications AND improvement in COVID symptoms AND 10 days have passed since onset of symptoms (or 10 days have passed since date of first positive viral diagnostic test for asymptomatic patients)  I have spent 15 minutes directly with the patient  Greater than 50% of this time was spent in counseling/coordination of care regarding: prognosis  Encounter provider Sherie Adkins MD    Provider located at [facility] [address] [place] [address] [phone]    Recent Visits  No visits were found meeting these conditions  Showing recent visits within past 7 days and meeting all other requirements  Today's Visits  Date Type Provider Dept   05/17/22 Telemedicine Sherie Adkins MD Pg  Primary Care [facility]   Showing today's visits and meeting all other requirements  Future Appointments  No visits were found meeting these conditions  Showing future appointments within next 150 days and meeting all other requirements     This virtual check-in was done via [employer] and patient was informed that this is a secure, HIPAA-compliant platform  She agrees to proceed  Patient agrees to participate in a virtual check in via telephone or video visit instead of presenting to the office to address urgent/immediate medical needs  Patient is aware this is a billable service      After connecting through [facility] the patient was identified by name and date of birth  Master Hensley was informed that this was a telemedicine visit and that the exam was being conducted confidentially over secure lines  Master Hensley acknowledged consent and understanding of privacy and security of the telemedicine visit  I informed the patient that I have reviewed her record in Epic and presented the opportunity for her to ask any questions regarding the visit today  The patient agreed to participate  Verification of patient location:  Patient is located in the following state in which I hold an active license: [place]    Subjective:   Master Hensley is a 46 y o  female who has been screened for COVID-19  Symptom change since last report: resolving  Patient is currently asymptomatic  Patient denies fever, chills, fatigue, malaise, congestion, rhinorrhea, sore throat, anosmia, loss of taste, cough, shortness of breath, chest tightness, abdominal pain, nausea, vomiting, diarrhea, myalgias and headaches  - Date of positive COVID-19 test: 5/14/2022  Type of test: PCR  COVID-19 vaccination status: Fully vaccinated (primary series)    Isaías Chang has been staying home and has isolated themselves in her home  She is taking care to not share personal items and is cleaning all surfaces that are touched often, like counters, tabletops, and doorknobs using household cleaning sprays or wipes  She is wearing a mask when she leaves her room       On paxlovid day 3    Lab Results   Component Value Date    Ladell Bleacher Sars-Cov-2 AG Test 11/30/2020    [address] Negative 11/30/2020     Past Medical History:   Diagnosis Date    Cholelithiasis     Chronic kidney disease     born with 1 kidney, right    Colon polyp     COVID-19 2020    GERD (gastroesophageal reflux disease)     H  pylori infection 09/2018    Kidney stone     Migraines     Obesity      Past Surgical History:   Procedure Laterality Date    CHOLECYSTECTOMY      COLONOSCOPY      EGD AND COLONOSCOPY  09/2018    positive h pylori, colon polyp, repeat 5 yrs    HYSTERECTOMY      KNEE SURGERY Left     OK COLONOSCOPY FLX DX W/COLLJ SPEC WHEN PFRMD N/A 9/12/2018    Procedure: EGD AND COLONOSCOPY;  Surgeon: Earl Black MD;  Location: [facility] GI LAB; Service: Gastroenterology    OK CYSTO/URETERO W/LITHOTRIPSY &INDWELL STENT INSRT Right 2/16/2018    Procedure: CYSTOSCOPY RIGHT URETEROSCOPY WITH , RIGTH RETROGRADE PYELOGRAM AND IRIGHT URETERAL STENT EXCHANGE;  Surgeon: Ketty Neil MD;  Location: [facility] Main OR;  Service: Urology    OK CYSTOURETHROSCOPY,URETER CATHETER Right 1/21/2018    Procedure: CYSTOSCOPY; BILATERAL RETROGRADE PYELOGRAM WITH INSERTION STENT RIGHT URETERAL;  Surgeon: Laura Christian MD;  Location:  MAIN OR;  Service: Urology    OK LAP,CHOLECYSTECTOMY N/A 10/30/2018    Procedure: LAPAROSCOPIC CHOLECYSTECTOMY;  Surgeon: Ashely Hickey DO;  Location: [address] [place] [address] MAIN OR;  Service: General     Current Outpatient Medications   Medication Sig Dispense Refill    butalbital-acetaminophen-caffeine (FIORICET,ESGIC) -40 mg per tablet Take 1 tablet by mouth every 6 (six) hours as needed for headaches 30 tablet 2    Calcium Carbonate-Vitamin D (Caltrate 600+D) 600-400 MG-UNIT per tablet Take 1 tablet by mouth daily 60 tablet 5    estradiol (ESTRACE) 2 MG tablet TAKE 1 TABLET BY MOUTH EVERY DAY 90 tablet 2    nirmatrelvir & ritonavir (Paxlovid) tablet therapy pack Take 2 tablets (300 mg) nirmatrelvir and 1 tablet (100 mg) ritonavir by mouth twice daily for 5 days  Take nirmatrelvir and ritonavir tablets together  No current facility-administered medications for this visit  Allergies   Allergen Reactions    Nitrofurantoin Shortness Of Breath and Hallucinations     Other reaction(s): Unknown Reaction    Reglan [Metoclopramide] Shortness Of Breath    Benadryl [Diphenhydramine] Palpitations       Review of Systems   Constitutional: Negative for chills, fatigue and fever     HENT: Negative for congestion, rhinorrhea and sore throat  Respiratory: Negative for cough, chest tightness and shortness of breath  Gastrointestinal: Negative for abdominal pain, diarrhea, nausea and vomiting  Musculoskeletal: Negative for myalgias  Neurological: Negative for headaches  Objective: There were no vitals filed for this visit  Physical Exam    VIRTUAL VISIT DISCLAIMER    Mic Mccann verbally agrees to participate in [employer]  Pt is aware that [employer] could be limited without vital signs or the ability to perform a full hands-on physical Zebrittany Cota understands she or the provider may request at any time to terminate the video visit and request the patient to seek care or treatment in person

## 2022-06-07 ENCOUNTER — HOSPITAL ENCOUNTER (EMERGENCY)
Facility: HOSPITAL | Age: 52
Discharge: HOME | End: 2022-06-07
Attending: EMERGENCY MEDICINE
Payer: COMMERCIAL

## 2022-06-07 VITALS
SYSTOLIC BLOOD PRESSURE: 152 MMHG | OXYGEN SATURATION: 98 % | RESPIRATION RATE: 18 BRPM | HEART RATE: 69 BPM | DIASTOLIC BLOOD PRESSURE: 87 MMHG | TEMPERATURE: 97.8 F

## 2022-06-07 DIAGNOSIS — S61.412A LACERATION OF LEFT HAND WITHOUT FOREIGN BODY, INITIAL ENCOUNTER: Primary | ICD-10-CM

## 2022-06-07 PROCEDURE — 90471 IMMUNIZATION ADMIN: CPT

## 2022-06-07 PROCEDURE — 99282 EMERGENCY DEPT VISIT SF MDM: CPT

## 2022-06-07 PROCEDURE — 12001 RPR S/N/AX/GEN/TRNK 2.5CM/<: CPT

## 2022-06-07 PROCEDURE — 90715 TDAP VACCINE 7 YRS/> IM: CPT

## 2022-06-07 RX ORDER — LIDOCAINE HYDROCHLORIDE 10 MG/ML
10 INJECTION, SOLUTION EPIDURAL; INFILTRATION; INTRACAUDAL; PERINEURAL ONCE
Status: COMPLETED | OUTPATIENT
Start: 2022-06-07 | End: 2022-06-07

## 2022-06-07 RX ADMIN — TETANUS TOXOID, REDUCED DIPHTHERIA TOXOID AND ACELLULAR PERTUSSIS VACCINE, ADSORBED 0.5 ML: 5; 2.5; 8; 8; 2.5 SUSPENSION INTRAMUSCULAR at 18:30

## 2022-06-07 RX ADMIN — LIDOCAINE HYDROCHLORIDE 10 ML: 10 INJECTION, SOLUTION EPIDURAL; INFILTRATION; INTRACAUDAL at 18:29

## 2022-06-07 NOTE — Clinical Note
Krista Cerna was seen and treated in our emergency department on 6/7/2022  No restrictions            Diagnosis:     Polly Almanza    She may return on this date: If you have any questions or concerns, please don't hesitate to call        Tello Acosta PA-C    ______________________________           _______________          _______________  Hospital Representative                              Date                                Time

## 2022-06-07 NOTE — DISCHARGE INSTRUCTIONS
Return to emergency department, urgent care, or primary care provider and 7-10 days for suture removal

## 2022-06-08 ENCOUNTER — PATIENT MESSAGE (OUTPATIENT)
Dept: FAMILY MEDICINE CLINIC | Facility: CLINIC | Age: 52
End: 2022-06-08

## 2022-06-08 NOTE — ED PROVIDER NOTES
History  Chief Complaint   Patient presents with    Hand Laceration     Pt reports cutting left hand with knife while cooking  Last Tetanus unknown  Patient is a 59-year-old female coming in after cutting her left pinky while cooking with a knife  Patient is in no acute distress at this time  Normal sensation, range of motion, and strength  Patient does not know when her last tetanus was  Patient no acute distress  Bleeding is controlled at this time      History provided by:  Patient   used: No    Laceration  Location:  Finger  Finger laceration location:  L little finger  Length:  1 cm  Depth:  Cutaneous  Time since incident:  1 hour  Laceration mechanism:  Knife  Tetanus status:  Unknown  Associated symptoms: no fever, no focal weakness, no numbness, no rash, no redness, no swelling and no streaking        Prior to Admission Medications   Prescriptions Last Dose Informant Patient Reported? Taking?    Calcium Carbonate-Vitamin D (Caltrate 600+D) 600-400 MG-UNIT per tablet   No No   Sig: Take 1 tablet by mouth daily   butalbital-acetaminophen-caffeine (FIORICET,ESGIC) -40 mg per tablet   No No   Sig: Take 1 tablet by mouth every 6 (six) hours as needed for headaches   estradiol (ESTRACE) 2 MG tablet   No No   Sig: TAKE 1 TABLET BY MOUTH EVERY DAY      Facility-Administered Medications: None       Past Medical History:   Diagnosis Date    Cholelithiasis     Chronic kidney disease     born with 1 kidney, right    Colon polyp     COVID-19 2020    GERD (gastroesophageal reflux disease)     H  pylori infection 09/2018    Kidney stone     Migraines     Obesity        Past Surgical History:   Procedure Laterality Date    CHOLECYSTECTOMY      COLONOSCOPY      EGD AND COLONOSCOPY  09/2018    positive h pylori, colon polyp, repeat 5 yrs    HYSTERECTOMY      KNEE SURGERY Left     [place] COLONOSCOPY FLX DX W/COLLJ SPEC WHEN PFRMD N/A 9/12/2018    Procedure: EGD AND COLONOSCOPY; Surgeon: Marilynn Hdz MD;  Location: [facility] GI LAB; Service: Gastroenterology    [place] CYSTO/URETERO W/LITHOTRIPSY &INDWELL STENT INSRT Right 2/16/2018    Procedure: CYSTOSCOPY RIGHT URETEROSCOPY WITH , RIGTH RETROGRADE PYELOGRAM AND IRIGHT URETERAL STENT EXCHANGE;  Surgeon: Ivanna Plaza MD;  Location: [facility] OR;  Service: Urology    VT CYSTOURETHROSCOPY,URETER CATHETER Right 1/21/2018    Procedure: Spencer Benson; BILATERAL RETROGRADE PYELOGRAM WITH INSERTION STENT RIGHT URETERAL;  Surgeon: Akbar Kirby MD;  Location:  MAIN OR;  Service: Urology    VT LAP,CHOLECYSTECTOMY N/A 10/30/2018    Procedure: LAPAROSCOPIC CHOLECYSTECTOMY;  Surgeon: Nina Bear DO;  Location: [facility] MAIN OR;  Service: General       Family History   Problem Relation Age of Onset    Alzheimer's disease Mother     Schizophrenia Father     Bipolar disorder Father     Hypertension Father      I have reviewed and agree with the history as documented  E-Cigarette/Vaping    E-Cigarette Use Never User      E-Cigarette/Vaping Substances     Social History     Tobacco Use    Smoking status: Never Smoker    Smokeless tobacco: Never Used   Vaping Use    Vaping Use: Never used   Substance Use Topics    Alcohol use: No    Drug use: No       Review of Systems   Constitutional: Negative  Negative for appetite change and fever  HENT: Negative  Eyes: Negative  Respiratory: Negative  Cardiovascular: Negative  Gastrointestinal: Negative  Genitourinary: Negative  Musculoskeletal: Negative  Skin: Positive for wound  Negative for rash  Neurological: Negative  Negative for focal weakness  Psychiatric/Behavioral: Negative  Physical Exam  Physical Exam  Vitals reviewed  Constitutional:       Appearance: Normal appearance  She is normal weight  HENT:      Head: Normocephalic and atraumatic        Right Ear: External ear normal       Left Ear: External ear normal       Nose: Nose normal    Eyes: Conjunctiva/sclera: Conjunctivae normal    Cardiovascular:      Rate and Rhythm: Normal rate  Pulmonary:      Effort: Pulmonary effort is normal    Musculoskeletal:         General: Normal range of motion  Cervical back: Normal range of motion  Skin:     General: Skin is warm and dry  Neurological:      General: No focal deficit present  Mental Status: She is alert  Vital Signs  ED Triage Vitals [06/07/22 1755]   Temperature Pulse Respirations Blood Pressure SpO2   97 8 °F (36 6 °C) 69 18 152/87 98 %      Temp src Heart Rate Source Patient Position - Orthostatic VS BP Location FiO2 (%)   -- -- -- -- --      Pain Score       --           Vitals:    06/07/22 1755   BP: 152/87   Pulse: 69         Visual Acuity      ED Medications  Medications   tetanus-diphtheria-acellular pertussis (BOOSTRIX) IM injection 0 5 mL (0 5 mL Intramuscular Given 6/7/22 1830)   lidocaine (PF) (XYLOCAINE-MPF) 1 % injection 10 mL (10 mL Infiltration Given by Other 6/7/22 1829)       Diagnostic Studies  Results Reviewed     None                 No orders to display              Procedures  Laceration repair    Date/Time: 6/7/2022 9:13 PM  Performed by: Joel White PA-C  Authorized by: Joel White PA-C   Consent: Verbal consent obtained    Risks and benefits: risks, benefits and alternatives were discussed  Consent given by: patient  Patient understanding: patient states understanding of the procedure being performed  Patient consent: the patient's understanding of the procedure matches consent given  Procedure consent: procedure consent matches procedure scheduled  Patient identity confirmed: verbally with patient  Body area: upper extremity  Location details: left small finger  Laceration length: 1 cm  Anesthesia: local infiltration    Anesthesia:  Local Anesthetic: lidocaine 1% without epinephrine  Anesthetic total: 4 mL    Wound Dehiscence:  Superficial Wound Dehiscence: simple closure      Procedure Details:  Irrigation solution: saline  Skin closure: Ethilon (5-0)  Number of sutures: 3  Technique: simple  Approximation: close  Approximation difficulty: simple  Patient tolerance: patient tolerated the procedure well with no immediate complications               ED Course                                             MDM  Number of Diagnoses or Management Options  Laceration of left hand without foreign body, initial encounter: new and does not require workup  Diagnosis management comments: Patient presents with laceration of left hand  Patient is in no acute distress  Wound was flushed, sutured shut, and patient was discharged home    Counseling: I had a detailed discussion with the patient and/or guardian regarding: the historical points, exam findings, and any diagnostic results supporting the discharge diagnosis, lab results, radiology results, discharge instructions reviewed with patient and/or family/caregiver and understanding was verbalized  Instructions given to return to the emergency department if symptoms worsen or persist, or if there are any questions or concerns that arise at home       All labs reviewed and utilized in the medical decision making process     All radiology studies independently viewed by me and interpreted by the radiologist     Portions of the record may have been created with voice recognition software   Occasional wrong word or "sound a like" substitutions may have occurred due to the inherent limitations of voice recognition software   Read the chart carefully and recognize, using context, where substitutions have occurred        Risk of Complications, Morbidity, and/or Mortality  Presenting problems: minimal  Diagnostic procedures: minimal  Management options: minimal    Patient Progress  Patient progress: stable      Disposition  Final diagnoses:   Laceration of left hand without foreign body, initial encounter     Time reflects when diagnosis was documented in both MDM as applicable and the Disposition within this note     Time User Action Codes Description Comment    6/7/2022  7:06 PM Vikram Odom Add [I95 450I] Laceration of left hand without foreign body, initial encounter       ED Disposition     ED Disposition   Discharge    Condition   Stable    Date/Time   Tue Jun 7, 2022  7:06 PM    Comment   Anish Cai discharge to home/self care  Follow-up Information     Follow up With Specialties Details Why Contact Info Additional Information    Mora Baugh MD [address] [facility]  [address] [facility] [address]  [address] [facility] Emergency Department Emergency Medicine  As needed, If symptoms worsen [address] [facility] [address] [facility] Emergency Department, [address], [place], [place], [address]          Discharge Medication List as of 6/7/2022  7:07 PM      CONTINUE these medications which have NOT CHANGED    Details   butalbital-acetaminophen-caffeine (FIORICET,ESGIC) -40 mg per tablet Take 1 tablet by mouth every 6 (six) hours as needed for headaches, Starting Tue 10/27/2020, Normal      Calcium Carbonate-Vitamin D (Caltrate 600+D) 600-400 MG-UNIT per tablet Take 1 tablet by mouth daily, Starting Fri 7/10/2020, Normal      estradiol (ESTRACE) 2 MG tablet TAKE 1 TABLET BY MOUTH EVERY DAY, Normal             No discharge procedures on file      PDMP Review       Value Time User    PDMP Reviewed  Yes 10/11/2021  8:45 PM Carmelina Way PA-C          ED Provider  Electronically Signed by           Joel White PA-C  06/07/22 5418

## 2022-06-16 ENCOUNTER — OFFICE VISIT (OUTPATIENT)
Dept: FAMILY MEDICINE CLINIC | Facility: CLINIC | Age: 52
End: 2022-06-16
Payer: COMMERCIAL

## 2022-06-16 VITALS
BODY MASS INDEX: 37.16 KG/M2 | WEIGHT: 177 LBS | DIASTOLIC BLOOD PRESSURE: 70 MMHG | HEIGHT: 58 IN | SYSTOLIC BLOOD PRESSURE: 120 MMHG | RESPIRATION RATE: 16 BRPM | OXYGEN SATURATION: 98 % | TEMPERATURE: 97.9 F | HEART RATE: 78 BPM

## 2022-06-16 DIAGNOSIS — S61.209D OPEN WOUND OF FINGER, SUBSEQUENT ENCOUNTER: Primary | ICD-10-CM

## 2022-06-16 PROCEDURE — 3008F BODY MASS INDEX DOCD: CPT | Performed by: FAMILY MEDICINE

## 2022-06-16 PROCEDURE — 3725F SCREEN DEPRESSION PERFORMED: CPT | Performed by: FAMILY MEDICINE

## 2022-06-16 PROCEDURE — 99212 OFFICE O/P EST SF 10 MIN: CPT | Performed by: FAMILY MEDICINE

## 2022-06-16 PROCEDURE — 1036F TOBACCO NON-USER: CPT | Performed by: FAMILY MEDICINE

## 2022-06-16 NOTE — PROGRESS NOTES
1  Open wound of finger, subsequent encounter      2  BMI 36 0-36 9,adult  The BMI is above normal  Nutrition recommendations include reducing portion sizes, decreasing overall calorie intake, 3-5 servings of fruits/vegetables daily, reducing fast food intake, consuming healthier snacks, decreasing soda and/or juice intake, moderation in carbohydrate intake, increasing intake of lean protein, reducing intake of saturated fat and trans fat, and reducing intake of cholesterol  Exercise recommendations include moderate aerobic physical activity for 150 minutes/week, vigorous aerobic physical activity for 75 minutes/week if possible, the most usual recommendation is exercising 3-5 times per week, joining a gym is a reasonable option with the precaution of the current situation, and strength training exercises are always important as we the as well  Left finger laceration, 5th  Finger wound    Stitches removal  Healing well  BMI Counseling: Body mass index is 36 99 kg/m²  The BMI is above normal  Nutrition recommendations include 3-5 servings of fruits/vegetables daily, consuming healthier snacks, moderation in carbohydrate intake, increasing intake of lean protein and reducing intake of saturated fat and trans fat

## 2022-11-16 ENCOUNTER — RA CDI HCC (OUTPATIENT)
Dept: OTHER | Facility: HOSPITAL | Age: 52
End: 2022-11-16

## 2022-11-16 NOTE — PROGRESS NOTES
[facility]  coding opportunities          Chart Reviewed number of suggestions sent to Provider: 2  [id number] [phone]  [id number]     Patients Insurance        Commercial Insurance: [employer]

## 2022-11-23 ENCOUNTER — OFFICE VISIT (OUTPATIENT)
Dept: FAMILY MEDICINE CLINIC | Facility: CLINIC | Age: 52
End: 2022-11-23

## 2022-11-23 ENCOUNTER — APPOINTMENT (OUTPATIENT)
Dept: LAB | Facility: HOSPITAL | Age: 52
End: 2022-11-23

## 2022-11-23 VITALS
HEIGHT: 58 IN | DIASTOLIC BLOOD PRESSURE: 70 MMHG | BODY MASS INDEX: 34 KG/M2 | TEMPERATURE: 98 F | WEIGHT: 162 LBS | RESPIRATION RATE: 16 BRPM | HEART RATE: 64 BPM | OXYGEN SATURATION: 100 % | SYSTOLIC BLOOD PRESSURE: 110 MMHG

## 2022-11-23 DIAGNOSIS — Z11.59 SCREENING FOR VIRAL DISEASE: ICD-10-CM

## 2022-11-23 DIAGNOSIS — R73.03 PRE-DIABETES: ICD-10-CM

## 2022-11-23 DIAGNOSIS — Z00.01 ENCOUNTER FOR GENERAL ADULT MEDICAL EXAMINATION WITH ABNORMAL FINDINGS: ICD-10-CM

## 2022-11-23 DIAGNOSIS — Z12.31 ENCOUNTER FOR SCREENING MAMMOGRAM FOR MALIGNANT NEOPLASM OF BREAST: Primary | ICD-10-CM

## 2022-11-23 DIAGNOSIS — N95.1 MENOPAUSAL SYMPTOM: ICD-10-CM

## 2022-11-23 LAB
ALBUMIN SERPL BCP-MCNC: 4.1 G/DL (ref 3.5–5)
ALP SERPL-CCNC: 74 U/L (ref 43–122)
ALT SERPL W P-5'-P-CCNC: 25 U/L
ANION GAP SERPL CALCULATED.3IONS-SCNC: 6 MMOL/L (ref 5–14)
AST SERPL W P-5'-P-CCNC: 22 U/L (ref 14–36)
BASOPHILS # BLD AUTO: 0.06 THOUSANDS/ÂΜL (ref 0–0.1)
BASOPHILS NFR BLD AUTO: 1 % (ref 0–1)
BILIRUB SERPL-MCNC: 0.28 MG/DL (ref 0.2–1)
BUN SERPL-MCNC: 17 MG/DL (ref 5–25)
CALCIUM SERPL-MCNC: 9.2 MG/DL (ref 8.4–10.2)
CHLORIDE SERPL-SCNC: 105 MMOL/L (ref 96–108)
CHOLEST SERPL-MCNC: 224 MG/DL
CO2 SERPL-SCNC: 31 MMOL/L (ref 21–32)
CREAT SERPL-MCNC: 0.87 MG/DL (ref 0.6–1.2)
EOSINOPHIL # BLD AUTO: 0.5 THOUSAND/ÂΜL (ref 0–0.61)
EOSINOPHIL NFR BLD AUTO: 9 % (ref 0–6)
ERYTHROCYTE [DISTWIDTH] IN BLOOD BY AUTOMATED COUNT: 13.1 % (ref 11.6–15.1)
EST. AVERAGE GLUCOSE BLD GHB EST-MCNC: 108 MG/DL
GFR SERPL CREATININE-BSD FRML MDRD: 76 ML/MIN/1.73SQ M
GLUCOSE P FAST SERPL-MCNC: 89 MG/DL (ref 70–99)
HBA1C MFR BLD: 5.4 %
HBV SURFACE AB SER-ACNC: 4.54 MIU/ML
HCT VFR BLD AUTO: 45.9 % (ref 34.8–46.1)
HDLC SERPL-MCNC: 45 MG/DL
HGB BLD-MCNC: 14.2 G/DL (ref 11.5–15.4)
IMM GRANULOCYTES # BLD AUTO: 0.01 THOUSAND/UL (ref 0–0.2)
IMM GRANULOCYTES NFR BLD AUTO: 0 % (ref 0–2)
LDLC SERPL CALC-MCNC: 162 MG/DL
LYMPHOCYTES # BLD AUTO: 1.55 THOUSANDS/ÂΜL (ref 0.6–4.47)
LYMPHOCYTES NFR BLD AUTO: 27 % (ref 14–44)
MCH RBC QN AUTO: 26.7 PG (ref 26.8–34.3)
MCHC RBC AUTO-ENTMCNC: 30.9 G/DL (ref 31.4–37.4)
MCV RBC AUTO: 86 FL (ref 82–98)
MONOCYTES # BLD AUTO: 0.54 THOUSAND/ÂΜL (ref 0.17–1.22)
MONOCYTES NFR BLD AUTO: 9 % (ref 4–12)
NEUTROPHILS # BLD AUTO: 3.17 THOUSANDS/ÂΜL (ref 1.85–7.62)
NEUTS SEG NFR BLD AUTO: 54 % (ref 43–75)
NONHDLC SERPL-MCNC: 179 MG/DL
NRBC BLD AUTO-RTO: 0 /100 WBCS
PLATELET # BLD AUTO: 282 THOUSANDS/UL (ref 149–390)
PMV BLD AUTO: 9.3 FL (ref 8.9–12.7)
POTASSIUM SERPL-SCNC: 4.7 MMOL/L (ref 3.5–5.3)
PROT SERPL-MCNC: 7.9 G/DL (ref 6.4–8.4)
RBC # BLD AUTO: 5.31 MILLION/UL (ref 3.81–5.12)
SODIUM SERPL-SCNC: 142 MMOL/L (ref 135–147)
TRIGL SERPL-MCNC: 84 MG/DL
WBC # BLD AUTO: 5.83 THOUSAND/UL (ref 4.31–10.16)

## 2022-11-23 RX ORDER — ESTRADIOL 2 MG/1
2 TABLET ORAL DAILY
Qty: 90 TABLET | Refills: 2 | Status: SHIPPED | OUTPATIENT
Start: 2022-11-23

## 2022-11-23 NOTE — PROGRESS NOTES
Name: Omar Vieira      : 1970      MRN: [id number]  Encounter Provider: Jenaro Burdick MD  Encounter Date: 2022   [facility]   I explained to patient physical examination is unremarkable, keep following diet that she has but incorporate exercise as part of lifestyle modification  Her mammogram is pending and not being doing for the past two years despite her claims of done in 2021  Encouraged to have mammogram screening every year  She has menopause and symptoms has been improved with history of the all she takes every day  We will continue same  The importance to follow-up with gynecologist was stressed  Screening hepatitis B  1  Pre-diabetes  -     HEMOGLOBIN A1C W/ EAG ESTIMATION; Future    2  Menopausal symptom  -     estradiol (ESTRACE) 2 MG tablet; Take 1 tablet (2 mg total) by mouth daily    3  Encounter for general adult medical examination with abnormal findings  -     CBC and differential; Future  -     Comprehensive metabolic panel; Future  -     Lipid panel; Future    4  Screening for viral disease  -     Hepatitis B surface antibody; Future           Subjective      HPI   Patient is here for PE, not having any acute distress  She lost 7 lb from previous visit six months ago while being in a diet [place], which is low carb diet and a exercise program that she does not do  She states the base of this program is vegetables and [place]  He is not eating rice or breads  Review of Systems   Constitutional: Negative for diaphoresis, fatigue, fever and unexpected weight change  Respiratory: Negative for cough, chest tightness, shortness of breath and wheezing  Cardiovascular: Negative for chest pain, palpitations and leg swelling  Gastrointestinal: Negative for abdominal pain, blood in stool and constipation     Neurological: Negative for dizziness, syncope, light-headedness and headaches  Hematological: Does not bruise/bleed easily  Psychiatric/Behavioral: Negative for behavioral problems, self-injury and sleep disturbance  The patient is not nervous/anxious  Current Outpatient Medications on File Prior to Visit   Medication Sig   • Calcium Carbonate-Vitamin D (Caltrate 600+D) 600-400 MG-UNIT per tablet Take 1 tablet by mouth daily   • [DISCONTINUED] estradiol (ESTRACE) 2 MG tablet TAKE 1 TABLET BY MOUTH EVERY DAY   • [DISCONTINUED] butalbital-acetaminophen-caffeine (FIORICET,ESGIC) -40 mg per tablet Take 1 tablet by mouth every 6 (six) hours as needed for headaches       Objective     /70 (BP Location: Left arm, Patient Position: Sitting, Cuff Size: Standard)   Pulse 64   Temp 98 °F (36 7 °C) (Temporal)   Resp 16   Ht 4' 10" (1 473 m)   Wt 73 5 kg (162 lb)   SpO2 100%   BMI 33 86 kg/m²     Physical Exam  Cardiovascular:      Rate and Rhythm: Normal rate and regular rhythm  Pulmonary:      Effort: Pulmonary effort is normal       Breath sounds: Normal breath sounds  Musculoskeletal:         General: Normal range of motion  Cervical back: Neck supple  Neurological:      General: No focal deficit present  Mental Status: She is alert and oriented to person, place, and time     Psychiatric:         Behavior: Behavior normal        Vladimir Hooker MD

## 2022-12-07 ENCOUNTER — TELEPHONE (OUTPATIENT)
Dept: FAMILY MEDICINE CLINIC | Facility: CLINIC | Age: 52
End: 2022-12-07

## 2022-12-07 NOTE — TELEPHONE ENCOUNTER
Pt called stated she has mammo done at [facility] [place] for breast     Patient stated if she can be tested for alzheimer as she has a family hx

## 2022-12-30 ENCOUNTER — OFFICE VISIT (OUTPATIENT)
Dept: FAMILY MEDICINE CLINIC | Facility: CLINIC | Age: 52
End: 2022-12-30

## 2022-12-30 VITALS
SYSTOLIC BLOOD PRESSURE: 122 MMHG | DIASTOLIC BLOOD PRESSURE: 80 MMHG | HEART RATE: 60 BPM | OXYGEN SATURATION: 98 % | BODY MASS INDEX: 35.22 KG/M2 | WEIGHT: 167.8 LBS | RESPIRATION RATE: 20 BRPM | HEIGHT: 58 IN | TEMPERATURE: 97 F

## 2022-12-30 DIAGNOSIS — E66.9 OBESITY (BMI 35.0-39.9 WITHOUT COMORBIDITY): ICD-10-CM

## 2022-12-30 DIAGNOSIS — G89.29 CHRONIC PAIN OF RIGHT KNEE: Primary | ICD-10-CM

## 2022-12-30 DIAGNOSIS — M25.561 CHRONIC PAIN OF RIGHT KNEE: Primary | ICD-10-CM

## 2022-12-30 DIAGNOSIS — Q60.2 CONGENITAL ABSENCE OF KIDNEY: ICD-10-CM

## 2022-12-30 DIAGNOSIS — Z78.0 MENOPAUSE: ICD-10-CM

## 2022-12-30 PROBLEM — M54.41 ACUTE BILATERAL LOW BACK PAIN WITH RIGHT-SIDED SCIATICA: Status: RESOLVED | Noted: 2021-08-17 | Resolved: 2022-12-30

## 2022-12-30 PROBLEM — J45.20 MILD INTERMITTENT ASTHMA WITHOUT COMPLICATION: Status: RESOLVED | Noted: 2021-11-09 | Resolved: 2022-12-30

## 2022-12-30 RX ORDER — METHYLPREDNISOLONE 4 MG/1
TABLET ORAL
Qty: 21 EACH | Refills: 0 | Status: SHIPPED | OUTPATIENT
Start: 2022-12-30 | End: 2023-01-03 | Stop reason: ALTCHOICE

## 2022-12-30 RX ORDER — DIAPER,BRIEF,INFANT-TODD,DISP
1 EACH MISCELLANEOUS DAILY
Qty: 90 TABLET | Refills: 1 | Status: SHIPPED | OUTPATIENT
Start: 2022-12-30

## 2022-12-30 RX ORDER — ACETAMINOPHEN 500 MG
500 TABLET ORAL EVERY 6 HOURS PRN
Qty: 90 TABLET | Refills: 0 | Status: SHIPPED | OUTPATIENT
Start: 2022-12-30

## 2022-12-30 NOTE — PROGRESS NOTES
Name: Kaylee Schaefer      : 1970      MRN: [id number]  Encounter Provider: Jimmy Richardson PA-C  Encounter Date: 2022   Encounter department: [facility]     1  Chronic pain of right knee  Assessment & Plan:  Recent worsening without inciting event or injury  Reviewed normal Xray knee from 2021  Start PT, consider ortho to previous ortho that did L knee meniscus repair  Start medrol for swelling and tylenol as needed  Suspect arthritis vs meniscus  Orders:  -     Ambulatory Referral to Physical Therapy; Future  -     methylPREDNISolone 4 MG tablet therapy pack; Use as directed on package  -     acetaminophen (TYLENOL) 500 mg tablet; Take 1 tablet (500 mg total) by mouth every 6 (six) hours as needed for mild pain or moderate pain (knee)    2  Congenital absence of kidney, left  Assessment & Plan:  Severe congenital atrophy of the left kidney per CT in 2018  Normal function of R kidney  Caution with NSAIDs  Lab Results   Component Value Date    BUN 17 2022     Lab Results   Component Value Date    CREATININE 0 87 2022           3  Menopause  Assessment & Plan:  Caution with hormone replacement therapy  Recommend return to gyn for management  Overdue for mammogram  Consider discontinue due to high risk medication  Orders:  -     Calcium Carbonate-Vitamin D (Calcium 600 +D High Potency) 600-10 MG-MCG TABS; Take 1 tablet by mouth daily    4  Obesity (BMI 35 0-39 9 without comorbidity)  Assessment & Plan: With recent 10 lb intentional weight loss  BMI Counseling: Body mass index is 35 07 kg/m²  The BMI is above normal  Nutrition recommendations include reducing portion sizes, decreasing overall calorie intake, 3-5 servings of fruits/vegetables daily and reducing fast food intake  Exercise recommendations include exercising 3-5 times per week and strength training exercises  Marsha Manzo is a 46 y o  female with a h/o L meniscus surgery about 4-5 years ago who presents with R knee pain, chronic with recent worsening x 1 week without inciting injury or event  Knee pain is worse along the anterior lateral aspect  She reports crepitus and swelling  She has not taken anything for pain due to having only one kidney  She was unaware that Tylenol does not affect the kidneys  She is weightbearing and works at a  at [employer]  Review of Systems   Constitutional: Negative for fever and unexpected weight change  Respiratory: Negative for shortness of breath  Cardiovascular: Negative for chest pain  Musculoskeletal: Positive for arthralgias and joint swelling  Negative for gait problem  Current Outpatient Medications on File Prior to Visit   Medication Sig   • estradiol (ESTRACE) 2 MG tablet Take 1 tablet (2 mg total) by mouth daily   • [DISCONTINUED] Calcium Carbonate-Vitamin D (Caltrate 600+D) 600-400 MG-UNIT per tablet Take 1 tablet by mouth daily       Objective     /80 (BP Location: Left arm, Patient Position: Sitting, Cuff Size: Standard)   Pulse 60   Temp (!) 97 °F (36 1 °C) (Tympanic)   Resp 20   Ht 4' 10" (1 473 m)   Wt 76 1 kg (167 lb 12 8 oz)   SpO2 98%   BMI 35 07 kg/m²     Physical Exam  Vitals and nursing note reviewed  Constitutional:       Appearance: Normal appearance  She is obese  HENT:      Head: Normocephalic and atraumatic  Cardiovascular:      Rate and Rhythm: Normal rate and regular rhythm  Pulses: Normal pulses  Heart sounds: Normal heart sounds  Pulmonary:      Effort: Pulmonary effort is normal       Breath sounds: Normal breath sounds  Musculoskeletal:      Right knee: Crepitus present  No bony tenderness  Normal range of motion  Tenderness present  No LCL laxity, MCL laxity, ACL laxity or PCL laxity  Normal alignment and normal patellar mobility  Normal pulse  Instability Tests: Anterior drawer test negative  Posterior drawer test negative  Anterior Lachman test negative  Lateral Perri test positive  Medial Perri test negative  Left knee: Normal  No LCL laxity, MCL laxity, ACL laxity or PCL laxity  Normal meniscus (hx of meniscus repair)  Normal pulse  Instability Tests: Anterior drawer test negative  Posterior drawer test negative  Anterior Lachman test negative  Medial Perri test negative and lateral Perri test negative  Right lower leg: No swelling  No edema  Left lower leg: No swelling  No edema  Legs:       Comments: No warmth or significant joint effusion   Neurological:      Mental Status: She is alert and oriented to person, place, and time  Mental status is at baseline         Crow Celeste PA-C

## 2022-12-30 NOTE — ASSESSMENT & PLAN NOTE
Severe congenital atrophy of the left kidney per CT in 4/2018  Normal function of R kidney  Caution with NSAIDs      Lab Results   Component Value Date    BUN 17 11/23/2022     Lab Results   Component Value Date    CREATININE 0 87 11/23/2022

## 2022-12-30 NOTE — ASSESSMENT & PLAN NOTE
Caution with hormone replacement therapy  Recommend return to gyn for management  Overdue for mammogram  Consider discontinue due to high risk medication

## 2022-12-30 NOTE — ASSESSMENT & PLAN NOTE
With recent 10 lb intentional weight loss  BMI Counseling: Body mass index is 35 07 kg/m²  The BMI is above normal  Nutrition recommendations include reducing portion sizes, decreasing overall calorie intake, 3-5 servings of fruits/vegetables daily and reducing fast food intake  Exercise recommendations include exercising 3-5 times per week and strength training exercises

## 2022-12-30 NOTE — ASSESSMENT & PLAN NOTE
Recent worsening without inciting event or injury  Reviewed normal Xray knee from 8/2021  Start PT, consider ortho to previous ortho that did L knee meniscus repair  Start medrol for swelling and tylenol as needed  Suspect arthritis vs meniscus

## 2023-01-03 ENCOUNTER — OFFICE VISIT (OUTPATIENT)
Dept: FAMILY MEDICINE CLINIC | Facility: CLINIC | Age: 53
End: 2023-01-03

## 2023-01-03 ENCOUNTER — HOSPITAL ENCOUNTER (OUTPATIENT)
Dept: RADIOLOGY | Facility: HOSPITAL | Age: 53
Discharge: HOME | End: 2023-01-03

## 2023-01-03 VITALS
DIASTOLIC BLOOD PRESSURE: 70 MMHG | SYSTOLIC BLOOD PRESSURE: 118 MMHG | HEIGHT: 58 IN | RESPIRATION RATE: 16 BRPM | BODY MASS INDEX: 34.85 KG/M2 | WEIGHT: 166 LBS | TEMPERATURE: 99.6 F | HEART RATE: 72 BPM | OXYGEN SATURATION: 98 %

## 2023-01-03 DIAGNOSIS — M25.561 ACUTE PAIN OF RIGHT KNEE: ICD-10-CM

## 2023-01-03 DIAGNOSIS — B34.9 VIRAL INFECTION, UNSPECIFIED: Primary | ICD-10-CM

## 2023-01-03 NOTE — PROGRESS NOTES
COVID-19 Outpatient Progress Note    Assessment/Plan:    Problem List Items Addressed This Visit     Acute pain of right knee     No relevant finding in physical exam   xray needed  Relevant Orders    XR knee 3 vw right non injury    Viral infection, unspecified - Primary       Current URI  Explained to [place]:  1  Clean  hands before and after using bathrooms, cleaning yourself, sneezing or cough  2  Stay home if you are sick    3  Know if antibiotics are not appropriate, most of time are useless  4  Follow all public and at work posted  precaution signs    5  Use personal protective equipment if needed, ex: masks    6  Get vaccinated, learn what vaccines are good for you    7  Stay away from public places during high flu seasons    8  Keep patient you family safe from infectious disease    9  Educate yourself and your love ones on Infection Prevention    10  Know your work or school facility Infection rules           Relevant Orders    Covid/Flu- Office Collect      Disposition:     I have spent 20 minutes directly with the patient  Greater than 50% of this time was spent in counseling/coordination of care regarding: diagnostic results, prognosis, risks and benefits of treatment options and impressions  Encounter provider: Nikolas Black MD     Provider located at: [facility] AT [address]   [address] [place] [id number] [phone]     Recent Visits  Date Type Provider Dept   12/30/22 Office Visit STEPHANIE Roper Pg recent visits within past 7 days and meeting all other requirements  Today's Visits  Date Type Provider Dept   01/03/23 Office Visit MD Edwardo Pettit today's visits and meeting all other requirements  Future Appointments  No visits were found meeting these conditions    Showing future appointments within next 150 days and meeting all other requirements     Subjective:   Katharina Wilkinson is a 46 y o  female who is concerned about COVID-19  Patient is currently asymptomatic  Patient's symptoms include rhinorrhea, sore throat and cough  Patient denies fever, chills, fatigue, malaise, congestion, anosmia, loss of taste, shortness of breath, chest tightness, abdominal pain, nausea, vomiting, diarrhea, myalgias and headaches  COVID-19 vaccination status: Fully vaccinated (primary series)    Exposure:   Contact with a person who is under investigation (PUI) for or who is positive for COVID-19 within the last 14 days?: No    Hospitalized recently for fever and/or lower respiratory symptoms?: No      Currently a healthcare worker that is involved in direct patient care?: No      Works in a special setting where the risk of COVID-19 transmission may be high? (this may include long-term care, correctional and assisted facilities; homeless shelters; assisted-living facilities and group homes ): No      Resident in a special setting where the risk of COVID-19 transmission may be high? (this may include long-term care, correctional and assisted facilities; homeless shelters; assisted-living facilities and group homes ): No      [place] also is complaining of   Acute pain in Right knee; started about 9 day(s) ago  The pain is continuos and has been gradually worsening since onset  The quality of the pain is described as aching  The pain radiates: no  The pain is at a severity of 5/10  The symptoms are aggravated by movement  Associated symptoms include numbness, paresthesias and tingling  She had tried multiple alternatives without improved her symptoms   She was prescribed medrol that did not use more due to sxs coincidentally started after taking medication      Lab Results   Component Value Date    Tyshawn Val AG Test 11/30/2020    [address] [facility] Negative 11/30/2020       Review of Systems   Constitutional: Negative for chills, fatigue and fever  HENT: Positive for rhinorrhea and sore throat  Negative for congestion  Respiratory: Positive for cough  Negative for chest tightness and shortness of breath  Gastrointestinal: Negative for abdominal pain, diarrhea, nausea and vomiting  Musculoskeletal: Negative for myalgias  Neurological: Negative for headaches       Current Outpatient Medications on File Prior to Visit   Medication Sig   • acetaminophen (TYLENOL) 500 mg tablet Take 1 tablet (500 mg total) by mouth every 6 (six) hours as needed for mild pain or moderate pain (knee)   • Calcium Carbonate-Vitamin D (Calcium 600 +D High Potency) 600-10 MG-MCG TABS Take 1 tablet by mouth daily   • estradiol (ESTRACE) 2 MG tablet Take 1 tablet (2 mg total) by mouth daily   • [DISCONTINUED] methylPREDNISolone 4 MG tablet therapy pack Use as directed on package       Objective:    /70 (BP Location: Left arm, Patient Position: Sitting, Cuff Size: Standard)   Pulse 72   Temp 99 6 °F (37 6 °C) (Tympanic)   Resp 16   Ht 4' 10" (1 473 m)   Wt 75 3 kg (166 lb)   SpO2 98%   BMI 34 69 kg/m²      Physical Exam  Ariadne Gleason MD

## 2023-01-03 NOTE — PROGRESS NOTES
COVID-19 Outpatient Progress Note    Assessment/Plan:    Problem List Items Addressed This Visit    None  Visit Diagnoses     Viral infection, unspecified    -  Primary    Relevant Orders    Covid/Flu- Office Collect         COVID-19 Plan    Encounter provider: Charity Winslow MD     Provider located at: [facility] AT [address]  [address] [place] [address]  [phone]     Recent Visits  Date Type Provider Dept   12/30/22 Office Visit Suzie Santana PA-C Pg  [facility] [place]   Showing recent visits within past 7 days and meeting all other requirements  Today's Visits  Date Type Provider Dept   01/03/23 Office Visit Charity Winslow MD Pg  [facility]   Showing today's visits and meeting all other requirements  Future Appointments  No visits were found meeting these conditions    Showing future appointments within next 150 days and meeting all other requirements     COVID-19 HPI  Lab Results   Component Value Date    SARSCOV2 Lumiradx Sars-Cov-2 AG Test 11/30/2020    [address] [facility] [address] Negative 11/30/2020       Review of Systems  Current Outpatient Medications on File Prior to Visit   Medication Sig   • acetaminophen (TYLENOL) 500 mg tablet Take 1 tablet (500 mg total) by mouth every 6 (six) hours as needed for mild pain or moderate pain (knee)   • Calcium Carbonate-Vitamin D (Calcium 600 +D High Potency) 600-10 MG-MCG TABS Take 1 tablet by mouth daily   • estradiol (ESTRACE) 2 MG tablet Take 1 tablet (2 mg total) by mouth daily   • [DISCONTINUED] methylPREDNISolone 4 MG tablet therapy pack Use as directed on package       Objective:    /70 (BP Location: Left arm, Patient Position: Sitting, Cuff Size: Standard)   Pulse 72   Temp 99 6 °F (37 6 °C) (Tympanic)   Resp 16   Ht 4' 10" (1 473 m)   Wt 75 3 kg (166 lb)   SpO2 98%   BMI 34 69 kg/m²      Physical Exam  Charity Winslow MD

## 2023-01-03 NOTE — ASSESSMENT & PLAN NOTE
Current URI  Explained to [place]:  1  Clean  hands before and after using bathrooms, cleaning yourself, sneezing or cough  2  Stay home if you are sick    3  Know if antibiotics are not appropriate, most of time are useless  4  Follow all public and at work posted  precaution signs    5  Use personal protective equipment if needed, ex: masks    6  Get vaccinated, learn what vaccines are good for you    7  Stay away from public places during high flu seasons    8  Keep patient you family safe from infectious disease    9  Educate yourself and your love ones on Infection Prevention    10   Know your work or school facility Infection rules

## 2023-01-04 DIAGNOSIS — U07.1 COVID-19: Primary | ICD-10-CM

## 2023-01-04 LAB
FLUAV RNA RESP QL NAA+PROBE: NEGATIVE
FLUBV RNA RESP QL NAA+PROBE: NEGATIVE
SARS-COV-2 RNA RESP QL NAA+PROBE: POSITIVE

## 2023-01-04 RX ORDER — NIRMATRELVIR AND RITONAVIR 300-100 MG
3 KIT ORAL 2 TIMES DAILY
Qty: 30 TABLET | Refills: 0 | Status: SHIPPED | OUTPATIENT
Start: 2023-01-04 | End: 2023-01-09

## 2023-01-13 ENCOUNTER — EVALUATION (OUTPATIENT)
Dept: PHYSICAL THERAPY | Facility: CLINIC | Age: 53
End: 2023-01-13

## 2023-01-13 DIAGNOSIS — G89.29 CHRONIC PAIN OF RIGHT KNEE: ICD-10-CM

## 2023-01-13 DIAGNOSIS — M25.561 CHRONIC PAIN OF RIGHT KNEE: ICD-10-CM

## 2023-01-13 NOTE — RESULT ENCOUNTER NOTE
Please call patient, x-ray of the right knee showed mild arthritis  Take as needed for pain every 6 hours  If pain persisted  Recommendation is to follow-up with orthopedist physician

## 2023-01-13 NOTE — PROGRESS NOTES
PT Evaluation     Today's date: 2023  Patient name: Raquel Conway  : 1970  MRN: [id number]  Referring provider: Christian Carson PA-C  Dx:   Encounter Diagnosis     ICD-10-CM    1  Chronic pain of right knee  M25 561 Ambulatory Referral to Physical Therapy    G89 29           Start Time: 1038          Assessment  Assessment details: Raquel Conway is a 46 y o  female who presents to PT evaluation for insidious R knee pain  Even though she does not report any MAYO her evaluation indicative of R medial meniscal tear  She demonstrates the following impairments: R knee pain, R knee and b/l proximal hip weakness, activity tolerance, weight bearing intolerance  The listed impairments limit the individuals ability to perform the following: prolonged sitting, walking, stair negotiation, bending/squatting  HEP was provided and reviewed  Patient is able to complete HEP with good technique and appropriate pain response  Patient expressed understanding of appropriate dosage and frequency of HEP  No additional referral necessary  Pt would benefit from skilled PT to improve upon impairments to improve QoL  Impairments: abnormal or restricted ROM, activity intolerance, impaired balance, impaired physical strength, lacks appropriate home exercise program and pain with function  Understanding of Dx/Px/POC: excellent  Goals  Short Term Goals: In 2-4 weeks, the patient will:  1  Pt will be able to demonstrate btw squat to 90 deg knee flexion  2  Pt will be able to sit for 30 minutes without knee pain  3  Supervision with HEP for self care    Long Term Goals: At time of D/C, the patient will:  1  Pt will be able to walk for 30 minutes without pain to improve functional mobility  2  FOTO to greater than predicted value  3   Independent with HEP for selfcare      Plan  Patient would benefit from: skilled physical therapy  Planned modality interventions: biofeedback, cryotherapy, manual electrical stimulation and TENS  Planned therapy interventions: abdominal trunk stabilization, activity modification, balance, balance/weight bearing training, flexibility, functional ROM exercises, gait training, graded activity, graded exercise, home exercise program, manual therapy, joint mobilization, neuromuscular re-education, patient education, strengthening, stretching, therapeutic activities, therapeutic exercise and therapeutic training  Frequency: 2x week  Duration in visits: 10  Duration in weeks: 5        Subjective Evaluation    History of Present Illness  Mechanism of injury: Pt reports to PT evaluation for  insidious R knee pain that began 2-3 weeks ago  She can recall any MAYO but states that she has a lot of swelling  She reports that she has pain, swelling, trouble putting weight on her affected limb, as well as difficulty with stairs or walking  She reports that she has more issues ascending stairs as well as with time dependency with walking  She works as a  where she has a lot of pain because she sits all day  She denies any functional limitation with hobbies and activities  Quality of life: excellent    Pain  Current pain ratin  At best pain ratin  At worst pain ratin  Location: R knee  Aggravating factors: walking, stair climbing, sitting, standing and lifting  Progression: improved      Diagnostic Tests  X-ray: normal  Patient Goals  Patient goals for therapy: decreased pain, improved balance, increased motion, independence with ADLs/IADLs, return to work and increased strength  Patient goal: to get knee better        Objective  1:1 with Dene Barthel, DPT for entirety of tx  OBJECTIVE:    Standing Posture & Lower Extremity Alignment:  Structure/Joint         Knee - Frontal  x Genuvalgum  Neutral  Genuvarum   Knee - Sagittal x Genurecurvatum  Neutral        Range of Motion: Goniometric revealed the following findings (in degrees)    Joint Motion Right: Left:    Hip Flexion 0 0   Hip Extension 135* 135   Hip External Rotation 135      Strength: MMT revealed the following findings    Joint Motion Right:  Left:    Hip Flexion 4/5 4/5   Hip Extension 4/5 4/5   Hip Abduction 4/5 4/5   Knee Extension 4/5 4/5   Knee Flexion 4/5 4/5     Additional Assessments:  Palpation: quad tendon, patellar tendon, pes anserine*, medial joint line*, gerdy's tubercle, medial femoral epicondyle, medial tibial eicondyle   Joint Mobility: painful end range r knee flexion     Special Tests:  Test (Structure evaluated) (+/-)   Valgus Stress (MCL) N   Varus Stress (LCL) N   Perri (Menisci) P (medial)   Thessaly (Menisci) P (medial)   Tibiofemoral compression/distraction P          Precautions: prediabetes, obesity,     Pertinent Findings and Outcome Measures:                                                                                                                                                                         Test / Measure  01/13/23        FOTO (pred 65) 51      Medial Meniscal Special test P      R Knee MMT 4/5      B/l proximal hip MMT 4/5            Manuals                                                                 Neuro Re-Ed                                                                                                        Ther Ex             Bike             Seated Heel Slide             LAQ             Bridge             SLR Flex/ABD/Ext             Mini Squats                                       Ther Activity                                       Gait Training                                       Modalities

## 2023-01-18 ENCOUNTER — OFFICE VISIT (OUTPATIENT)
Dept: PHYSICAL THERAPY | Facility: CLINIC | Age: 53
End: 2023-01-18

## 2023-01-18 DIAGNOSIS — M25.561 CHRONIC PAIN OF RIGHT KNEE: Primary | ICD-10-CM

## 2023-01-18 DIAGNOSIS — G89.29 CHRONIC PAIN OF RIGHT KNEE: Primary | ICD-10-CM

## 2023-01-18 NOTE — PROGRESS NOTES
Daily Note     Today's date: 2023  Patient name: Ankur Mendenhall  : 1970  MRN: [id number]  Referring provider: Enzo Schneider PA-C  Dx:   Encounter Diagnosis     ICD-10-CM    1  Chronic pain of right knee  M25 561     G89 29                      Subjective: Patient denies pain presently, but did c/o R lateral knee pain earlier this a m  No new complaints to report  Objective: See treatment diary below      Assessment: Tolerated treatment well  Reported the Bike was a little difficult  All other ex performed without a complaint  Patient would benefit from continued PT      Plan: Continue per plan of care        Precautions: prediabetes, obesity,     Pertinent Findings and Outcome Measures:                                                                                                                                                                         Test / Measure  23        FOTO (pred 65) 51      Medial Meniscal Special test P      R Knee MMT 4/5      B/l proximal hip MMT 4/5            Manuals                                                                 Neuro Re-Ed                                                                                                        Ther Ex             Bike 4'            Seated Heel Slide 10"x10            LAQ 2x10            Bridge 5"x15            SLR Flex/ABD/Ext 10xea            Mini Squats x10            SAQ 5"x10                         Ther Activity                                       Gait Training                                       Modalities

## 2023-01-20 ENCOUNTER — APPOINTMENT (OUTPATIENT)
Dept: PHYSICAL THERAPY | Facility: CLINIC | Age: 53
End: 2023-01-20

## 2023-01-25 ENCOUNTER — OFFICE VISIT (OUTPATIENT)
Dept: PHYSICAL THERAPY | Facility: CLINIC | Age: 53
End: 2023-01-25

## 2023-01-25 DIAGNOSIS — M25.561 CHRONIC PAIN OF RIGHT KNEE: Primary | ICD-10-CM

## 2023-01-25 DIAGNOSIS — G89.29 CHRONIC PAIN OF RIGHT KNEE: Primary | ICD-10-CM

## 2023-01-25 NOTE — PROGRESS NOTES
Daily Note     Today's date: 2023  Patient name: Colton Perla  : 1970  MRN: [id number]  Referring provider: Sherley Leon PA-C  Dx:   Encounter Diagnosis     ICD-10-CM    1  Chronic pain of right knee  M25 561     G89 29                      Subjective: Pt presents to PT reporting pain remains in posterior R knee  She states most pain with weight bearing activity  Pt reports decreased pain post PT session  Objective: See treatment diary below      Assessment: Noted antalgic gait while amb in the clinic  Pt demonstrates good tolerance to progression to increase strength in R LE  Noted decreased antalgic gait post PT session  Patient demonstrated fatigue post treatment, exhibited good technique with therapeutic exercises and would benefit from continued PT to increase flexibility, strength and function  Plan: Continue per plan of care        Precautions: prediabetes, obesity,     Pertinent Findings and Outcome Measures:                                                                                                                                                                         Test / Measure  23        FOTO (pred 65) 51      Medial Meniscal Special test P      R Knee MMT 4/5      B/l proximal hip MMT 4/5            Manuals                                                                    Neuro Re-Ed                                                                                                              Ther Ex            Bike  4' 6'           Seated Heel Slide  10"x10 10"x10           LAQ  2x10 2# 2x10           Bridge  5"x15 5"x20           SLR Flex/ABD/Ext  10xea 15xea           Mini Squats  x10 2 x 10           SAQ  5"x10 2# 5"x20                         Ther Activity                                        Gait Training                                        Modalities

## 2023-01-27 ENCOUNTER — OFFICE VISIT (OUTPATIENT)
Dept: PHYSICAL THERAPY | Facility: CLINIC | Age: 53
End: 2023-01-27

## 2023-01-27 DIAGNOSIS — M25.561 CHRONIC PAIN OF RIGHT KNEE: Primary | ICD-10-CM

## 2023-01-27 DIAGNOSIS — G89.29 CHRONIC PAIN OF RIGHT KNEE: Primary | ICD-10-CM

## 2023-01-27 NOTE — PROGRESS NOTES
Daily Note     Today's date: 2023  Patient name: Man Martinez  : 1970  MRN: [id number]  Referring provider: Shey Jose PA-C  Dx:   Encounter Diagnosis     ICD-10-CM    1  Chronic pain of right knee  M25 561     G89 29                      Subjective: pt reports no pain entering today's treatment but states that her knee isn't improved much other than that she is doing exercise every day  Objective: See treatment diary below      Assessment: Tolerated treatment well  Patient demonstrated fatigue post treatment, exhibited good technique with therapeutic exercises and would benefit from continued PT  Pt tolerated today's session well as per appropriate response to intervention  She demonstrated poor carryover of exercises and HEP, encouraged to continue with at home  She demonstrated decreased irritability and good motor performance of exercises, but demonstrated poor muscle performance and activity tolerance  Encouraged to increase tolerance of exercise prior to prior to increasing volume  Pt would benefit from continued, skilled PT to improve impairments to improve QoL  1:1 with Natalia Dickey DPT for entirety of tx  Plan: Continue per plan of care        Precautions: prediabetes, obesity,     Pertinent Findings and Outcome Measures:                                                                                                                                                                         Test / Measure  23        FOTO (pred 65) 51      Medial Meniscal Special test P      R Knee MMT 4/5      B/l proximal hip MMT 4/5            Manuals                                                                   Neuro Re-Ed                                                                                                             Ther Ex           Bike  4' 6' 6'          Seated Heel Slide  10"x10 10"x10 10"x10          LAQ  2x10 2# 2x10 2# 2x10          Bridge  5"x15 5"x20 5"x20          SLR Flex/ABD/Ext  10xea 15xea x15 ea          Mini Squats  x10 2 x 10 2 x 10          SAQ  5"x10 2# 5"x20 2# 5"x20                        Ther Activity 1/13 1/18 1/25 1/27                                      Gait Training 1/13 1/18 1/25 1/27                                      Modalities 1/13 1/18 1/25 1/27

## 2023-02-01 ENCOUNTER — OFFICE VISIT (OUTPATIENT)
Dept: PHYSICAL THERAPY | Facility: CLINIC | Age: 53
End: 2023-02-01

## 2023-02-01 DIAGNOSIS — G89.29 CHRONIC PAIN OF RIGHT KNEE: Primary | ICD-10-CM

## 2023-02-01 DIAGNOSIS — M25.561 CHRONIC PAIN OF RIGHT KNEE: Primary | ICD-10-CM

## 2023-02-01 NOTE — PROGRESS NOTES
Daily Note     Today's date: 2023  Patient name: Man Martinez  : 1970  MRN: [id number]  Referring provider: Shalini Bravo*  Dx:   Encounter Diagnosis     ICD-10-CM    1  Chronic pain of right knee  M25 561     G89 29                      Subjective: Pt reports at this time she is moving better and can tolerate more everyday  She is doing her interventions at home  Objective: See treatment diary below      Assessment: Due to patients decrease in pain she progressed a few interventions today to improve her strength and tolerance to activities  She did not have any issues with this and should continue tto progress next visit  Plan: Continue per plan of care        Precautions: prediabetes, obesity,     Pertinent Findings and Outcome Measures:                                                                                                                                                                         Test / Measure  23        FOTO (pred 65) 51      Medial Meniscal Special test P      R Knee MMT 4/5      B/l proximal hip MMT 4/5            Manuals  2/                                                                 Neuro Re-Ed          Lateral step down     0R 2x10         Side steps     NV                                                                                Ther Ex /         Bike  4' 6' 6' 6'         Seated Heel Slide  10"x10 10"x10 10"x10 10"x10         LAQ  2x10 2# 2x10 2# 2x10 2# 2x10         Bridge  5"x15 5"x20 5"x20 20x          SLR Flex/ABD/Ext  10xea 15xea x15 ea 2# 2x10 flex no weight others         Mini Squats  x10 2 x 10 2 x 10 2x10         SAQ  5"x10 2# 5"x20 2# 5"x20          LP     2x10 65#          Ther Activity                                       Gait Training                                       Modalities

## 2023-02-03 ENCOUNTER — OFFICE VISIT (OUTPATIENT)
Dept: PHYSICAL THERAPY | Facility: CLINIC | Age: 53
End: 2023-02-03

## 2023-02-03 DIAGNOSIS — G89.29 CHRONIC PAIN OF RIGHT KNEE: Primary | ICD-10-CM

## 2023-02-03 DIAGNOSIS — M25.561 CHRONIC PAIN OF RIGHT KNEE: Primary | ICD-10-CM

## 2023-02-03 NOTE — PROGRESS NOTES
Daily Note     Today's date: 2/3/2023  Patient name: Tana Stevenson  : 1970  MRN: [id number]  Referring provider: Robbin Nix  Dx:   Encounter Diagnosis     ICD-10-CM    1  Chronic pain of right knee  M25 561     G89 29           Start Time: 1210  Stop Time: 1248  Total time in clinic (min): 38 minutes    Subjective: Pt presents to therapy reporting her R knee feels like it's getting better  Pt reports there was an incident at work so she was running about 10 minutes late  Pt accommodated  Objective: See treatment diary below      Assessment: Tolerated treatment well  Patient progressed LP with increased weight for one set before requesting to drop back down due to quad fatigue  Lateral step downs were progressed with increased height and VC to control the eccentric portion of the movement  Lateral walks were introduced with initial VC cuing for form  Bridges were progressed with added abduction band with no adverse symptoms  Pt shows improvement in function since start of therapy with FOTO outcome score increase to 67/65  Discussed making more appointments with pt versus making today her last day  Pt reports she wants to talk to her doctor before she decides and will give us a call to make more appointments  Plan: Continue per plan of care        Precautions: prediabetes, obesity,     Pertinent Findings and Outcome Measures:                                                                                                                                                                         Test / Measure  23        FOTO (pred 65) 51      Medial Meniscal Special test P      R Knee MMT 4/5      B/l proximal hip MMT 4/5            Manuals  2/ 2/3                                                                Neuro Re-Ed  2/ 2/3        Lateral step down     0R 2x10 1R 2x10 ea        Side steps     NV  otb 4x at mirror Ther Ex 1/13 1/18 1/25 1/27 2/1 2/3        Bike  4' 6' 6' 6' 6'        Seated Heel Slide  10"x10 10"x10 10"x10 10"x10         LAQ  2x10 2# 2x10 2# 2x10 2# 2x10 2# 15x        Bridge  5"x15 5"x20 5"x20 20x  otb abduction 2x10        SLR Flex/ABD/Ext  10xea 15xea x15 ea 2# 2x10 flex no weight others 2# 2x10 flex, 1# 2x10 abd/ext        Mini Squats  x10 2 x 10 2 x 10 2x10 2x10        SAQ  5"x10 2# 5"x20 2# 5"x20          LP     2x10 65#  1x10 75#, 1x10 65# feet 2        Ther Activity 1/13 1/18 1/25 1/27  2/3                                    Gait Training 1/13 1/18 1/25 1/27  2/3                                    Modalities 1/13 1/18 1/25 1/27  2/3

## 2023-07-01 DIAGNOSIS — Z78.0 ASYMPTOMATIC MENOPAUSAL STATE: ICD-10-CM

## 2023-07-03 RX ORDER — CALCIUM CARBONATE/VITAMIN D3 600 MG-10
TABLET ORAL
Qty: 90 TABLET | Refills: 1 | Status: SHIPPED | OUTPATIENT
Start: 2023-07-03

## 2023-08-29 DIAGNOSIS — N95.1 MENOPAUSAL SYMPTOM: ICD-10-CM

## 2023-08-29 RX ORDER — ESTRADIOL 2 MG/1
2 TABLET ORAL DAILY
Qty: 90 TABLET | Refills: 2 | Status: SHIPPED | OUTPATIENT
Start: 2023-08-29

## 2023-11-14 ENCOUNTER — VBI (OUTPATIENT)
Dept: ADMINISTRATIVE | Facility: OTHER | Age: 53
End: 2023-11-14

## 2023-11-15 ENCOUNTER — RA CDI HCC (OUTPATIENT)
Dept: OTHER | Facility: HOSPITAL | Age: 53
End: 2023-11-15

## 2023-12-05 ENCOUNTER — LAB (OUTPATIENT)
Dept: LAB | Facility: HOSPITAL | Age: 53
End: 2023-12-05
Payer: COMMERCIAL

## 2023-12-05 ENCOUNTER — OFFICE VISIT (OUTPATIENT)
Dept: FAMILY MEDICINE CLINIC | Facility: CLINIC | Age: 53
End: 2023-12-05
Payer: COMMERCIAL

## 2023-12-05 VITALS
BODY MASS INDEX: 37.36 KG/M2 | OXYGEN SATURATION: 98 % | WEIGHT: 178 LBS | RESPIRATION RATE: 16 BRPM | TEMPERATURE: 97.8 F | HEIGHT: 58 IN | SYSTOLIC BLOOD PRESSURE: 106 MMHG | DIASTOLIC BLOOD PRESSURE: 80 MMHG | HEART RATE: 68 BPM

## 2023-12-05 DIAGNOSIS — Z00.01 ENCOUNTER FOR GENERAL ADULT MEDICAL EXAMINATION WITH ABNORMAL FINDINGS: Primary | ICD-10-CM

## 2023-12-05 DIAGNOSIS — Z12.31 ENCOUNTER FOR SCREENING MAMMOGRAM FOR MALIGNANT NEOPLASM OF BREAST: ICD-10-CM

## 2023-12-05 DIAGNOSIS — Z12.11 COLON CANCER SCREENING: ICD-10-CM

## 2023-12-05 DIAGNOSIS — Z00.01 ENCOUNTER FOR GENERAL ADULT MEDICAL EXAMINATION WITH ABNORMAL FINDINGS: ICD-10-CM

## 2023-12-05 LAB
ALBUMIN SERPL BCP-MCNC: 4.3 G/DL (ref 3.5–5)
ALP SERPL-CCNC: 60 U/L (ref 34–104)
ALT SERPL W P-5'-P-CCNC: 15 U/L (ref 7–52)
ANION GAP SERPL CALCULATED.3IONS-SCNC: 10 MMOL/L
AST SERPL W P-5'-P-CCNC: 13 U/L (ref 13–39)
BASOPHILS # BLD AUTO: 0.07 THOUSANDS/ÂΜL (ref 0–0.1)
BASOPHILS NFR BLD AUTO: 1 % (ref 0–1)
BILIRUB SERPL-MCNC: 0.45 MG/DL (ref 0.2–1)
BUN SERPL-MCNC: 16 MG/DL (ref 5–25)
CALCIUM SERPL-MCNC: 9.3 MG/DL (ref 8.4–10.2)
CHLORIDE SERPL-SCNC: 106 MMOL/L (ref 96–108)
CHOLEST SERPL-MCNC: 233 MG/DL
CO2 SERPL-SCNC: 24 MMOL/L (ref 21–32)
CREAT SERPL-MCNC: 0.94 MG/DL (ref 0.6–1.3)
EOSINOPHIL # BLD AUTO: 0.67 THOUSAND/ÂΜL (ref 0–0.61)
EOSINOPHIL NFR BLD AUTO: 10 % (ref 0–6)
ERYTHROCYTE [DISTWIDTH] IN BLOOD BY AUTOMATED COUNT: 13.2 % (ref 11.6–15.1)
GFR SERPL CREATININE-BSD FRML MDRD: 69 ML/MIN/1.73SQ M
GLUCOSE P FAST SERPL-MCNC: 104 MG/DL (ref 65–99)
HCT VFR BLD AUTO: 45.2 % (ref 34.8–46.1)
HDLC SERPL-MCNC: 61 MG/DL
HGB BLD-MCNC: 14.5 G/DL (ref 11.5–15.4)
IMM GRANULOCYTES # BLD AUTO: 0 THOUSAND/UL (ref 0–0.2)
IMM GRANULOCYTES NFR BLD AUTO: 0 % (ref 0–2)
LDLC SERPL CALC-MCNC: 151 MG/DL (ref 0–100)
LYMPHOCYTES # BLD AUTO: 1.44 THOUSANDS/ÂΜL (ref 0.6–4.47)
LYMPHOCYTES NFR BLD AUTO: 22 % (ref 14–44)
MCH RBC QN AUTO: 27.1 PG (ref 26.8–34.3)
MCHC RBC AUTO-ENTMCNC: 32.1 G/DL (ref 31.4–37.4)
MCV RBC AUTO: 85 FL (ref 82–98)
MONOCYTES # BLD AUTO: 0.53 THOUSAND/ÂΜL (ref 0.17–1.22)
MONOCYTES NFR BLD AUTO: 8 % (ref 4–12)
NEUTROPHILS # BLD AUTO: 3.8 THOUSANDS/ÂΜL (ref 1.85–7.62)
NEUTS SEG NFR BLD AUTO: 59 % (ref 43–75)
NONHDLC SERPL-MCNC: 172 MG/DL
NRBC BLD AUTO-RTO: 0 /100 WBCS
PLATELET # BLD AUTO: 263 THOUSANDS/UL (ref 149–390)
PMV BLD AUTO: 9.6 FL (ref 8.9–12.7)
POTASSIUM SERPL-SCNC: 4.1 MMOL/L (ref 3.5–5.3)
PROT SERPL-MCNC: 7.1 G/DL (ref 6.4–8.4)
RBC # BLD AUTO: 5.35 MILLION/UL (ref 3.81–5.12)
SODIUM SERPL-SCNC: 140 MMOL/L (ref 135–147)
TRIGL SERPL-MCNC: 106 MG/DL
WBC # BLD AUTO: 6.51 THOUSAND/UL (ref 4.31–10.16)

## 2023-12-05 PROCEDURE — 80061 LIPID PANEL: CPT

## 2023-12-05 PROCEDURE — 36415 COLL VENOUS BLD VENIPUNCTURE: CPT

## 2023-12-05 PROCEDURE — 85025 COMPLETE CBC W/AUTO DIFF WBC: CPT

## 2023-12-05 PROCEDURE — 99396 PREV VISIT EST AGE 40-64: CPT | Performed by: FAMILY MEDICINE

## 2023-12-05 PROCEDURE — 80053 COMPREHEN METABOLIC PANEL: CPT

## 2023-12-05 NOTE — PROGRESS NOTES
Nichole Antonio      : 1970      MRN: [id number]  Encounter Provider: Olga Fuller MD  Encounter Date: 2023   Encounter department: [address] [place] [address]     1. Encounter for general adult medical examination with abnormal findings  -     CBC and differential; Future  -     Comprehensive metabolic panel; Future  -     Lipid panel; Future    2. BMI 37.0-37.9, adult    3. Encounter for screening mammogram for malignant neoplasm of breast  -     Mammo screening bilateral w 3d & cad; Future; Expected date: 2023    4. Colon cancer screening  -     Cologuard      BMI Counseling: Body mass index is 37.2 kg/m². The BMI is above normal. Nutrition recommendations include reducing portion sizes. Exercise recommendations include moderate aerobic physical activity for 150 minutes/week. Chief Complaint  Follow-up for weight management and general health maintenance. History of Present Illness  49-year-old female presents for a routine physical exam with concerns about weight fluctuations and sleep disturbances. Patient reports a history of weight variability: 182 lbs in 2019, 163 lbs in 2020, 183 lbs in May 2021, and currently 178 lbs. She expresses a goal to reach and maintain a weight of 158 lbs. Medications  Estradiol 2 mg daily for hormone replacement, Calcium and Vitamin D supplements. Allergies   Allergen Reactions    Nitrofurantoin Shortness Of Breath and Hallucinations     Other reaction(s): Unknown Reaction    Reglan [Metoclopramide] Shortness Of Breath    Benadryl [Diphenhydramine] Palpitations         Past Medical History  Unilateral renal agenesis since birth. Social History  Patient has recently resumed work. No tobacco, alcohol, or illicit drug use reported. Family History  Mother  due to ovarian cancer.  Father alive and well in [place]. No other significant family medical history reported. Review of Systems  Denies any renal problems or changes in urination. No other systemic complaints. Vital Signs  /80 (BP Location: Left arm, Patient Position: Sitting, Cuff Size: Standard)   Pulse 68   Temp 97.8 °F (36.6 °C) (Tympanic)   Resp 16   Ht 4' 10" (1.473 m)   Wt 80.7 kg (178 lb)   SpO2 98%   BMI 37.20 kg/m²       Physical Exam  she looks in non distress. Oriented, Lungs are clear. Heart is having Normal rhythm w/o murmurs. Abdomen is protuberant/obese. Neurological system has no deficit and walks normal.    Assessment and Plan  5. General health maintenance: Recommend routine blood work, including lipid panel and thyroid function tests. Schedule follow-up appointment in one year or sooner if any new concerns arise. 1. Obesity: Patient to continue with weight management strategies, including dietary modifications and increased physical activity. Goal to lose 8 lbs to reach a weight of 170 lbs, which is within her average weight over the past five years. Encourage gradual weight loss over the course of a year to minimize cardiac stress. 2. Sleep disturbances: Further evaluation needed. Consider sleep hygiene education and possible referral to a sleep specialist if no improvement with conservative measures. 3. Hormone replacement therapy: Continue current regimen of Estradiol 2 mg daily. Monitor for side effects and efficacy. Encouraged to follow up with Gyn specialist. Perform Breast screening yearly. 4. Renal health: Continue monitoring renal function due to unilateral renal agenesis. Encourage hydration and regular check-ups. Additional Notes:  Patient expresses desire to lose weight and maintain a healthier lifestyle.       MD Naun Benavidez [address]

## 2023-12-10 NOTE — RESULT ENCOUNTER NOTE
The cholesterol and blood sugar are high. No need for medication but decrease amount of saturated fat and carbs in diet and exercise more frequently.

## 2024-01-11 ENCOUNTER — APPOINTMENT (EMERGENCY)
Dept: CT IMAGING | Facility: HOSPITAL | Age: 54
End: 2024-01-11
Payer: COMMERCIAL

## 2024-01-11 ENCOUNTER — HOSPITAL ENCOUNTER (EMERGENCY)
Facility: HOSPITAL | Age: 54
Discharge: HOME | End: 2024-01-12
Attending: EMERGENCY MEDICINE
Payer: COMMERCIAL

## 2024-01-11 DIAGNOSIS — N12 PYELONEPHRITIS OF RIGHT KIDNEY: Primary | ICD-10-CM

## 2024-01-11 LAB
ALBUMIN SERPL BCP-MCNC: 4.4 G/DL (ref 3.5–5)
ALP SERPL-CCNC: 60 U/L (ref 34–104)
ALT SERPL W P-5'-P-CCNC: 12 U/L (ref 7–52)
ANION GAP SERPL CALCULATED.3IONS-SCNC: 10 MMOL/L
AST SERPL W P-5'-P-CCNC: 11 U/L (ref 13–39)
BASOPHILS # BLD AUTO: 0.08 THOUSANDS/ÂΜL (ref 0–0.1)
BASOPHILS NFR BLD AUTO: 1 % (ref 0–1)
BILIRUB SERPL-MCNC: 0.46 MG/DL (ref 0.2–1)
BUN SERPL-MCNC: 14 MG/DL (ref 5–25)
CALCIUM SERPL-MCNC: 9.5 MG/DL (ref 8.4–10.2)
CHLORIDE SERPL-SCNC: 104 MMOL/L (ref 96–108)
CO2 SERPL-SCNC: 24 MMOL/L (ref 21–32)
CREAT SERPL-MCNC: 0.8 MG/DL (ref 0.6–1.3)
EOSINOPHIL # BLD AUTO: 0.42 THOUSAND/ÂΜL (ref 0–0.61)
EOSINOPHIL NFR BLD AUTO: 3 % (ref 0–6)
ERYTHROCYTE [DISTWIDTH] IN BLOOD BY AUTOMATED COUNT: 13.3 % (ref 11.6–15.1)
GFR SERPL CREATININE-BSD FRML MDRD: 84 ML/MIN/1.73SQ M
GLUCOSE SERPL-MCNC: 110 MG/DL (ref 65–140)
HCT VFR BLD AUTO: 43.7 % (ref 34.8–46.1)
HGB BLD-MCNC: 14.2 G/DL (ref 11.5–15.4)
IMM GRANULOCYTES # BLD AUTO: 0.03 THOUSAND/UL (ref 0–0.2)
IMM GRANULOCYTES NFR BLD AUTO: 0 % (ref 0–2)
LYMPHOCYTES # BLD AUTO: 1.62 THOUSANDS/ÂΜL (ref 0.6–4.47)
LYMPHOCYTES NFR BLD AUTO: 11 % (ref 14–44)
MCH RBC QN AUTO: 27.3 PG (ref 26.8–34.3)
MCHC RBC AUTO-ENTMCNC: 32.5 G/DL (ref 31.4–37.4)
MCV RBC AUTO: 84 FL (ref 82–98)
MONOCYTES # BLD AUTO: 1.04 THOUSAND/ÂΜL (ref 0.17–1.22)
MONOCYTES NFR BLD AUTO: 7 % (ref 4–12)
NEUTROPHILS # BLD AUTO: 11.07 THOUSANDS/ÂΜL (ref 1.85–7.62)
NEUTS SEG NFR BLD AUTO: 78 % (ref 43–75)
NRBC BLD AUTO-RTO: 0 /100 WBCS
PLATELET # BLD AUTO: 263 THOUSANDS/UL (ref 149–390)
PMV BLD AUTO: 9.3 FL (ref 8.9–12.7)
POTASSIUM SERPL-SCNC: 3.9 MMOL/L (ref 3.5–5.3)
PROT SERPL-MCNC: 7.5 G/DL (ref 6.4–8.4)
RBC # BLD AUTO: 5.2 MILLION/UL (ref 3.81–5.12)
SODIUM SERPL-SCNC: 138 MMOL/L (ref 135–147)
WBC # BLD AUTO: 14.26 THOUSAND/UL (ref 4.31–10.16)

## 2024-01-11 PROCEDURE — 87186 SC STD MICRODIL/AGAR DIL: CPT | Performed by: EMERGENCY MEDICINE

## 2024-01-11 PROCEDURE — 96365 THER/PROPH/DIAG IV INF INIT: CPT

## 2024-01-11 PROCEDURE — 74176 CT ABD & PELVIS W/O CONTRAST: CPT

## 2024-01-11 PROCEDURE — 85025 COMPLETE CBC W/AUTO DIFF WBC: CPT | Performed by: EMERGENCY MEDICINE

## 2024-01-11 PROCEDURE — 80053 COMPREHEN METABOLIC PANEL: CPT | Performed by: EMERGENCY MEDICINE

## 2024-01-11 PROCEDURE — 99284 EMERGENCY DEPT VISIT MOD MDM: CPT

## 2024-01-11 PROCEDURE — 87077 CULTURE AEROBIC IDENTIFY: CPT | Performed by: EMERGENCY MEDICINE

## 2024-01-11 PROCEDURE — 81001 URINALYSIS AUTO W/SCOPE: CPT | Performed by: EMERGENCY MEDICINE

## 2024-01-11 PROCEDURE — 36415 COLL VENOUS BLD VENIPUNCTURE: CPT

## 2024-01-11 PROCEDURE — 87086 URINE CULTURE/COLONY COUNT: CPT | Performed by: EMERGENCY MEDICINE

## 2024-01-11 PROCEDURE — 96375 TX/PRO/DX INJ NEW DRUG ADDON: CPT

## 2024-01-11 RX ORDER — ONDANSETRON 2 MG/ML
4 INJECTION INTRAMUSCULAR; INTRAVENOUS ONCE
Status: COMPLETED | OUTPATIENT
Start: 2024-01-11 | End: 2024-01-11

## 2024-01-11 RX ORDER — HYDROMORPHONE HCL/PF 1 MG/ML
0.5 SYRINGE (ML) INJECTION ONCE
Status: COMPLETED | OUTPATIENT
Start: 2024-01-11 | End: 2024-01-11

## 2024-01-11 RX ORDER — KETOROLAC TROMETHAMINE 30 MG/ML
10 INJECTION, SOLUTION INTRAMUSCULAR; INTRAVENOUS ONCE
Status: COMPLETED | OUTPATIENT
Start: 2024-01-11 | End: 2024-01-11

## 2024-01-11 RX ADMIN — KETOROLAC TROMETHAMINE 9.9 MG: 30 INJECTION, SOLUTION INTRAMUSCULAR; INTRAVENOUS at 23:33

## 2024-01-11 RX ADMIN — HYDROMORPHONE HYDROCHLORIDE 0.5 MG: 1 INJECTION, SOLUTION INTRAMUSCULAR; INTRAVENOUS; SUBCUTANEOUS at 22:13

## 2024-01-11 RX ADMIN — ONDANSETRON 4 MG: 2 INJECTION INTRAMUSCULAR; INTRAVENOUS at 22:14

## 2024-01-11 RX ADMIN — SODIUM CHLORIDE, SODIUM LACTATE, POTASSIUM CHLORIDE, AND CALCIUM CHLORIDE 500 ML: .6; .31; .03; .02 INJECTION, SOLUTION INTRAVENOUS at 23:33

## 2024-01-12 VITALS
SYSTOLIC BLOOD PRESSURE: 98 MMHG | TEMPERATURE: 98.5 F | HEART RATE: 67 BPM | RESPIRATION RATE: 18 BRPM | OXYGEN SATURATION: 96 % | DIASTOLIC BLOOD PRESSURE: 51 MMHG

## 2024-01-12 LAB
BACTERIA UR QL AUTO: ABNORMAL /HPF
BILIRUB UR QL STRIP: NEGATIVE
CLARITY UR: ABNORMAL
COLOR UR: ABNORMAL
GLUCOSE UR STRIP-MCNC: NEGATIVE MG/DL
HGB UR QL STRIP.AUTO: ABNORMAL
KETONES UR STRIP-MCNC: ABNORMAL MG/DL
LEUKOCYTE ESTERASE UR QL STRIP: ABNORMAL
NITRITE UR QL STRIP: POSITIVE
NON-SQ EPI CELLS URNS QL MICRO: ABNORMAL /HPF
PH UR STRIP.AUTO: 7 [PH]
PROT UR STRIP-MCNC: ABNORMAL MG/DL
RBC #/AREA URNS AUTO: ABNORMAL /HPF
SP GR UR STRIP.AUTO: 1.01 (ref 1–1.03)
UROBILINOGEN UR STRIP-ACNC: <2 MG/DL
WBC #/AREA URNS AUTO: ABNORMAL /HPF

## 2024-01-12 PROCEDURE — 99285 EMERGENCY DEPT VISIT HI MDM: CPT | Performed by: EMERGENCY MEDICINE

## 2024-01-12 PROCEDURE — 96367 TX/PROPH/DG ADDL SEQ IV INF: CPT

## 2024-01-12 RX ORDER — OXYCODONE HYDROCHLORIDE 5 MG/1
5 TABLET ORAL EVERY 6 HOURS PRN
Qty: 10 TABLET | Refills: 0 | Status: SHIPPED | OUTPATIENT
Start: 2024-01-12

## 2024-01-12 RX ORDER — ONDANSETRON 4 MG/1
8 TABLET, FILM COATED ORAL EVERY 8 HOURS PRN
Qty: 15 TABLET | Refills: 3 | Status: SHIPPED | OUTPATIENT
Start: 2024-01-12

## 2024-01-12 RX ORDER — CEFDINIR 300 MG/1
300 CAPSULE ORAL EVERY 12 HOURS SCHEDULED
Qty: 20 CAPSULE | Refills: 0 | Status: SHIPPED | OUTPATIENT
Start: 2024-01-12 | End: 2024-01-22

## 2024-01-12 RX ADMIN — CEFTRIAXONE 1000 MG: 1 INJECTION, POWDER, FOR SOLUTION INTRAMUSCULAR; INTRAVENOUS at 00:43

## 2024-01-12 NOTE — DISCHARGE INSTRUCTIONS
Diagnosis: right sided pyelonephritis- kidney infection     - please keep well hydrated     - for pain-fevers- temp > 100.4-   over the counter generic acetaminophen 500 mg every 4 hrs while awake     - for severe pain - oxycodone 1 tablet every 4-6 hrs as needed     - for any nausea/ vomiting : zofran  odt- 2 tablets dissolve in the mouth every 6-8 hrs as needed     - cefdinir- antibiotic- starting tomorrow 1 capsule 2 times a day for the next 10 days    - please call your primary doctor  tomorrow to schedule an appointment for a recheck within 1 week    - please return to  the er for any persistent fevers- temp > 100.4/ any worsening/ intractable right sided flank pain - or any persistent vomiting - feeling worse not better over next 2-3 days

## 2024-01-14 LAB — BACTERIA UR CULT: ABNORMAL

## 2024-01-14 NOTE — RESULT ENCOUNTER NOTE
Patient's urine culture results were reviewed.  Greater than 100,000 colony counts of E. coli.  Patient is on cefdinir.  Awaiting sensitivities

## 2024-01-15 ENCOUNTER — TELEPHONE (OUTPATIENT)
Dept: FAMILY MEDICINE CLINIC | Facility: CLINIC | Age: 54
End: 2024-01-15

## 2024-01-15 NOTE — TELEPHONE ENCOUNTER
Pt called unable to contact [facility] for pt to call office back need to scheduled follow up from the ER

## 2024-01-16 ENCOUNTER — OFFICE VISIT (OUTPATIENT)
Dept: FAMILY MEDICINE CLINIC | Facility: CLINIC | Age: 54
End: 2024-01-16
Payer: COMMERCIAL

## 2024-01-16 VITALS
SYSTOLIC BLOOD PRESSURE: 118 MMHG | DIASTOLIC BLOOD PRESSURE: 70 MMHG | OXYGEN SATURATION: 98 % | RESPIRATION RATE: 16 BRPM | TEMPERATURE: 98.9 F | HEART RATE: 64 BPM | WEIGHT: 179 LBS | HEIGHT: 58 IN | BODY MASS INDEX: 37.57 KG/M2

## 2024-01-16 DIAGNOSIS — Z78.0 ASYMPTOMATIC MENOPAUSAL STATE: ICD-10-CM

## 2024-01-16 DIAGNOSIS — N95.1 MENOPAUSAL SYMPTOM: ICD-10-CM

## 2024-01-16 DIAGNOSIS — N12 PYELONEPHRITIS OF RIGHT KIDNEY: Primary | ICD-10-CM

## 2024-01-16 PROCEDURE — 99214 OFFICE O/P EST MOD 30 MIN: CPT | Performed by: FAMILY MEDICINE

## 2024-01-16 RX ORDER — ESTRADIOL 2 MG/1
2 TABLET ORAL DAILY
Qty: 90 TABLET | Refills: 2 | Status: SHIPPED | OUTPATIENT
Start: 2024-01-16

## 2024-01-16 RX ORDER — CALCIUM CARBONATE/VITAMIN D3 600 MG-10
1 TABLET ORAL DAILY
Qty: 90 TABLET | Refills: 1 | Status: SHIPPED | OUTPATIENT
Start: 2024-01-16

## 2024-01-16 NOTE — PROGRESS NOTES
Name: Sania Dixon      : 1970      MRN: [id number]  Encounter Provider: Hank Velasco MD  Encounter Date: 2024   Encounter department: [facility]    Assessment & Plan   Pyelonephritis of right kidney   Continue Omnicef 300 mg every 12 hours for the full course as prescribed for UTI due to E. coli.  Advise patient to increase fluid intake, monitor for symptoms of improvement, and report any side effects or lack of symptom resolution.  Schedule follow-up visit or advise patient to seek care if symptoms persist or worsen after completion of antibiotic course.  Meds refills for Menopause.  1. Pyelonephritis of right kidney    2. Asymptomatic menopausal state  -     Calcium Carb-Cholecalciferol 600-10 MG-MCG TABS; Take 1 tablet by mouth daily    3. Menopausal symptom  -     estradiol (ESTRACE) 2 MG tablet; Take 1 tablet (2 mg total) by mouth daily      Chief Complaint  Follow-up visit for UTI management.    History of Present Illness  53-year-old female presents for follow-up after initial visit for UTI secondary to E. coli. Patient has been on Omnicef 300 mg with no reported issues.      Current Outpatient Medications:     Calcium Carb-Cholecalciferol 600-10 MG-MCG TABS, Take 1 tablet by mouth daily, Disp: 90 tablet, Rfl: 1    estradiol (ESTRACE) 2 MG tablet, Take 1 tablet (2 mg total) by mouth daily, Disp: 90 tablet, Rfl: 2    cefdinir (OMNICEF) 300 mg capsule, Take 1 capsule (300 mg total) by mouth every 12 (twelve) hours for 20 doses, Disp: 20 capsule, Rfl: 0    ondansetron (Zofran) 4 mg tablet, Take 2 tablets (8 mg total) by mouth every 8 (eight) hours as needed for nausea or vomiting for up to 10 doses Zofran odt 2 tablets dissolve in the mouth  every 6-8 hrs as needed, Disp: 15 tablet, Rfl: 3    oxyCODONE (Roxicodone) 5 immediate release tablet, Take 1 tablet (5 mg total) by mouth every 6 (six) hours as needed for moderate pain or severe pain for up to 10  "doses Max Daily Amount: 20 mg, Disp: 10 tablet, Rfl: 0          Allergies  Allergies   Allergen Reactions    Nitrofurantoin Shortness Of Breath and Hallucinations     Other reaction(s): Unknown Reaction    Reglan [Metoclopramide] Shortness Of Breath    Benadryl [Diphenhydramine] Palpitations         Past Medical History  Hysterectomy -   Has only the right kidney.      Vital Signs  /70 (BP Location: Left arm, Patient Position: Sitting, Cuff Size: Standard)   Pulse 64   Temp 98.9 °F (37.2 °C) (Tympanic)   Resp 16   Ht 4' 10\" (1.473 m)   Wt 81.2 kg (179 lb)   SpO2 98%   BMI 37.41 kg/m²     She looks in non distress. Person, Place, and Day, HEENT unremarkable.  Lungs are clear. Heart is having Normal rhythm w/o murmurs.  Abdomen is soft, non tender. MS: Muscle strength and tone were normal.  Neurological system has no deficit and walks normal.            Hank Velasco MD   [facility] PRIMARY CARE [facility]        "

## 2024-01-16 NOTE — ED PROVIDER NOTES
History  Chief Complaint   Patient presents with    Flank Pain     R sided flank pain that started after work today. No urinary symptoms. Pt was only born with one kidney.      53 yr female c/o several days of  dysuria with 1 day of r flank pain with nausea no cp/sob- no gyn comps       History provided by:  Spouse  Flank Pain  Pain location:  R flank  Pain quality: aching    Pain radiates to:  Does not radiate  Pain severity:  Moderate  Onset quality:  Gradual  Duration:  1 day  Timing:  Constant  Chronicity:  New  Associated symptoms: dysuria and nausea    Associated symptoms: no constipation, no diarrhea, no hematuria, no vaginal bleeding, no vaginal discharge and no vomiting        Prior to Admission Medications   Prescriptions Last Dose Informant Patient Reported? Taking?   Calcium Carb-Cholecalciferol 600-10 MG-MCG TABS   No No   Sig: TAKE 1 TABLET BY MOUTH EVERY DAY   estradiol (ESTRACE) 2 MG tablet   No No   Sig: TAKE 1 TABLET BY MOUTH EVERY DAY      Facility-Administered Medications: None       Past Medical History:   Diagnosis Date    Cholelithiasis     Chronic kidney disease     born with 1 kidney, right    Colon polyp     COVID-19 2020    GERD (gastroesophageal reflux disease)     H. pylori infection 09/2018    Kidney stone     Migraines     Obesity     Pre-diabetes        Past Surgical History:   Procedure Laterality Date    CHOLECYSTECTOMY      COLONOSCOPY      EGD AND COLONOSCOPY  09/2018    positive h pylori, colon polyp, repeat 5 yrs    HYSTERECTOMY      KNEE SURGERY Left     meniscus in her 40s    WI COLONOSCOPY FLX DX W/COLLJ SPEC WHEN PFRMD N/A 09/12/2018    Procedure: EGD AND COLONOSCOPY;  Surgeon: Chang Wang MD;  Location: [facility] GI LAB;  Service: Gastroenterology    WI CYSTO BLADDER W/URETERAL CATHETERIZATION Right 01/21/2018    Procedure: CYSTOSCOPY; BILATERAL RETROGRADE PYELOGRAM WITH INSERTION STENT RIGHT URETERAL;  Surgeon: Phuc Zelaya MD;  Location: [facility] OR;  Service: Urology     MT CYSTO/URETERO W/LITHOTRIPSY &INDWELL STENT INSRT Right 02/16/2018    Procedure: CYSTOSCOPY RIGHT URETEROSCOPY WITH , RIGTH RETROGRADE PYELOGRAM AND IRIGHT URETERAL STENT EXCHANGE;  Surgeon: Slim Reyna MD;  Location: [facility];  Service: Urology    MT LAPAROSCOPY SURG CHOLECYSTECTOMY N/A 10/30/2018    Procedure: LAPAROSCOPIC CHOLECYSTECTOMY;  Surgeon: Ye Ward DO;  Location:  MAIN OR;  Service: General       Family History   Problem Relation Age of Onset    Alzheimer's disease Mother     Schizophrenia Father     Bipolar disorder Father     Hypertension Father      I have reviewed and agree with the history as documented.    E-Cigarette/Vaping    E-Cigarette Use Never User      E-Cigarette/Vaping Substances     Social History     Tobacco Use    Smoking status: Never    Smokeless tobacco: Never   Vaping Use    Vaping status: Never Used   Substance Use Topics    Alcohol use: No    Drug use: No       Review of Systems   Constitutional: Negative.    HENT: Negative.     Eyes: Negative.    Respiratory: Negative.     Cardiovascular: Negative.    Gastrointestinal:  Positive for nausea. Negative for abdominal distention, abdominal pain, anal bleeding, blood in stool, constipation, diarrhea, rectal pain and vomiting.   Endocrine: Negative.    Genitourinary:  Positive for dysuria, flank pain and frequency. Negative for decreased urine volume, difficulty urinating, dyspareunia, enuresis, genital sores, hematuria, menstrual problem, pelvic pain, urgency, vaginal bleeding, vaginal discharge and vaginal pain.   Musculoskeletal:  Positive for back pain. Negative for arthralgias, gait problem, joint swelling, myalgias, neck pain and neck stiffness.   Skin: Negative.    Allergic/Immunologic: Negative.    Neurological: Negative.    Hematological: Negative.    Psychiatric/Behavioral: Negative.         Physical Exam  Physical Exam  Vitals and nursing note reviewed.   Constitutional:       General: She is in acute distress.       Appearance: Normal appearance. She is not ill-appearing, toxic-appearing or diaphoretic.      Comments: Avss- htnsive- which improved in the er - pulse ox 96 % o ra- interpretation is normal- no intervention- pt I  pain    HENT:      Head: Normocephalic and atraumatic.      Nose: Nose normal.      Mouth/Throat:      Mouth: Mucous membranes are moist.   Eyes:      General: No scleral icterus.        Right eye: No discharge.         Left eye: No discharge.      Extraocular Movements: Extraocular movements intact.      Conjunctiva/sclera: Conjunctivae normal.      Pupils: Pupils are equal, round, and reactive to light.      Comments: Mm pink   Neck:      Vascular: No carotid bruit.      Comments: No pmt ct/tl/s spine   Cardiovascular:      Rate and Rhythm: Normal rate and regular rhythm.      Pulses: Normal pulses.      Heart sounds: Normal heart sounds. No murmur heard.     No friction rub. No gallop.   Pulmonary:      Effort: Pulmonary effort is normal. No respiratory distress.      Breath sounds: Normal breath sounds. No stridor. No wheezing, rhonchi or rales.   Chest:      Chest wall: No tenderness.   Abdominal:      General: There is no distension.      Palpations: Abdomen is soft. There is no mass.      Tenderness: There is abdominal tenderness. There is right CVA tenderness. There is no left CVA tenderness, guarding or rebound.      Hernia: No hernia is present.      Comments: No peritoneal signs- mild r mid flank tenderness to palpation    Musculoskeletal:         General: No swelling, tenderness, deformity or signs of injury. Normal range of motion.      Cervical back: Normal range of motion and neck supple. No rigidity or tenderness.      Right lower leg: No edema.      Left lower leg: No edema.      Comments: Qual bilateral radial/dp pulses- no ble edema/calf tenderness/asym/ erythema   Lymphadenopathy:      Cervical: No cervical adenopathy.   Skin:     General: Skin is warm.      Capillary Refill:  Capillary refill takes less than 2 seconds.      Coloration: Skin is not jaundiced or pale.      Findings: No bruising, erythema, lesion or rash.   Neurological:      General: No focal deficit present.      Mental Status: She is alert and oriented to person, place, and time. Mental status is at baseline.      Cranial Nerves: No cranial nerve deficit.      Sensory: No sensory deficit.      Motor: No weakness.      Coordination: Coordination normal.      Gait: Gait normal.      Comments: Normal non focal neuro exam    Psychiatric:         Mood and Affect: Mood normal.         Behavior: Behavior normal.         Thought Content: Thought content normal.         Judgment: Judgment normal.         Vital Signs  ED Triage Vitals   Temperature Pulse Respirations Blood Pressure SpO2   01/11/24 2010 01/11/24 2010 01/11/24 2010 01/11/24 2010 01/11/24 2010   98.5 °F (36.9 °C) 80 18 (!) 189/77 100 %      Temp Source Heart Rate Source Patient Position - Orthostatic VS BP Location FiO2 (%)   01/11/24 2010 01/11/24 2010 01/11/24 2010 01/11/24 2010 --   Oral Monitor Sitting Right arm       Pain Score       01/11/24 2213       6           Vitals:    01/11/24 2200 01/11/24 2230 01/11/24 2330 01/12/24 0030   BP: 122/57 107/54 106/59 98/51   Pulse: 70 71 76 67   Patient Position - Orthostatic VS:             Visual Acuity      ED Medications  Medications   HYDROmorphone (DILAUDID) injection 0.5 mg (0.5 mg Intravenous Given 1/11/24 2213)   ondansetron (ZOFRAN) injection 4 mg (4 mg Intravenous Given 1/11/24 2214)   ketorolac (TORADOL) injection 9.9 mg (9.9 mg Intravenous Given 1/11/24 2333)   lactated ringers bolus 500 mL (0 mL Intravenous Stopped 1/12/24 0042)   ceftriaxone (ROCEPHIN) 1 g/50 mL in dextrose IVPB (0 mg Intravenous Stopped 1/12/24 0112)       Diagnostic Studies  Results Reviewed       Procedure Component Value Units Date/Time    Urine culture [[id number]]  (Abnormal)  (Susceptibility) Collected: 01/11/24 2219    Lab Status:  Final result Specimen: Urine, Clean Catch Updated: 01/14/24 0654     Urine Culture >100,000 cfu/ml Escherichia coli    Susceptibility       Escherichia coli (1)       Antibiotic Interpretation Microscan   Method Status    ZID Performed  Yes  ELLI Final    Ampicillin ($$) Susceptible <=8.00 ug/ml ELLI Final    Aztreonam ($$$)  Susceptible <=4 ug/ml ELLI Final    Cefazolin ($) Susceptible <=2.00 ug/ml ELLI Final    Ciprofloxacin ($)  Susceptible <=0.25 ug/ml ELLI Final    Gentamicin ($$) Susceptible <=2 ug/ml ELLI Final    Levofloxacin ($) Susceptible <=0.50 ug/ml ELLI Final    Nitrofurantoin Susceptible <=32 ug/ml ELLI Final    Tetracycline Susceptible <=4 ug/ml ELLI Final    Tobramycin ($) Susceptible <=2 ug/ml ELLI Final    Trimethoprim + Sulfamethoxazole ($$$) Susceptible <=0.5/9.5 ug/ml ELLI Final                       Urine Microscopic [[id number]]  (Abnormal) Collected: 01/11/24 2219    Lab Status: Final result Specimen: Urine, Clean Catch Updated: 01/12/24 0015     RBC, UA 4-10 /hpf      WBC, UA 30-50 /hpf      Epithelial Cells None Seen /hpf      Bacteria, UA Innumerable /hpf     UA w Reflex to Microscopic w Reflex to Culture [[id number]]  (Abnormal) Collected: 01/11/24 2219    Lab Status: Final result Specimen: Urine, Clean Catch Updated: 01/12/24 0013     Color, UA Light Yellow     Clarity, UA Turbid     Specific Gravity, UA 1.013     pH, UA 7.0     Leukocytes, UA Large     Nitrite, UA Positive     Protein,  (3+) mg/dl      Glucose, UA Negative mg/dl      Ketones, UA 10 (1+) mg/dl      Urobilinogen, UA <2.0 mg/dl      Bilirubin, UA Negative     Occult Blood, UA Moderate    Comprehensive metabolic panel [[id number]]  (Abnormal) Collected: 01/11/24 2015    Lab Status: Final result Specimen: Blood from Arm, Right Updated: 01/11/24 2040     Sodium 138 mmol/L      Potassium 3.9 mmol/L      Chloride 104 mmol/L      CO2 24 mmol/L      ANION GAP 10 mmol/L      BUN 14 mg/dL      Creatinine 0.80 mg/dL      Glucose 110 mg/dL       Calcium 9.5 mg/dL      AST 11 U/L      ALT 12 U/L      Alkaline Phosphatase 60 U/L      Total Protein 7.5 g/dL      Albumin 4.4 g/dL      Total Bilirubin 0.46 mg/dL      eGFR 84 ml/min/1.73sq m     Narrative:      National Kidney Disease Foundation guidelines for Chronic Kidney Disease (CKD):     Stage 1 with normal or high GFR (GFR > 90 mL/min/1.73 square meters)    Stage 2 Mild CKD (GFR = 60-89 mL/min/1.73 square meters)    Stage 3A Moderate CKD (GFR = 45-59 mL/min/1.73 square meters)    Stage 3B Moderate CKD (GFR = 30-44 mL/min/1.73 square meters)    Stage 4 Severe CKD (GFR = 15-29 mL/min/1.73 square meters)    Stage 5 End Stage CKD (GFR <15 mL/min/1.73 square meters)  Note: GFR calculation is accurate only with a steady state creatinine    CBC and differential [[id number]]  (Abnormal) Collected: 01/11/24 2015    Lab Status: Final result Specimen: Blood from Arm, Right Updated: 01/11/24 2024     WBC 14.26 Thousand/uL      RBC 5.20 Million/uL      Hemoglobin 14.2 g/dL      Hematocrit 43.7 %      MCV 84 fL      MCH 27.3 pg      MCHC 32.5 g/dL      RDW 13.3 %      MPV 9.3 fL      Platelets 263 Thousands/uL      nRBC 0 /100 WBCs      Neutrophils Relative 78 %      Immat GRANS % 0 %      Lymphocytes Relative 11 %      Monocytes Relative 7 %      Eosinophils Relative 3 %      Basophils Relative 1 %      Neutrophils Absolute 11.07 Thousands/µL      Immature Grans Absolute 0.03 Thousand/uL      Lymphocytes Absolute 1.62 Thousands/µL      Monocytes Absolute 1.04 Thousand/µL      Eosinophils Absolute 0.42 Thousand/µL      Basophils Absolute 0.08 Thousands/µL                    CT renal stone study abdomen pelvis without contrast   Final Result by Luis Amanda MD (01/11 5493)      Mild right hydroureteronephrosis without an obstructing ureteral calculus. The findings may be related to a recently passed stone or a non-radiopaque obstructive distal ureteral lesion such as thrombus, infectious debris, stricture or  tumor. Follow-up    with delayed postcontrast images through the kidney, ureter and bladder if the patient has persistent pain.      No free air or free fluid.      Absent left kidney.         Workstation performed: [id number]                    Procedures  Procedures         ED Course  ED Course as of 01/16/24 1300   Thu Jan 11, 2024   2219 Er md note- initial er workup was first nursed    Fri Jan 12, 2024   0023 Er md n ote- discussed ct findings and dx of pyelonephritis - with-- discussed disposition -  pt offered admit-- adonay wei start iv antibx - pt wants to talk to  - adonay wei re-eval    0037 - er md note- pt states wants to go home- after iv antibx will d/c                                              Medical Decision Making  Pt with r flank pain - no fever/schills-- pt with remote hx of kidney stones- but by hx has symproms of uti-- pt will need pain control/lab/s urine ct scan of abd/pelvis     Problems Addressed:  Pyelonephritis of right kidney: acute illness or injury with systemic symptoms     Details: See chart     Amount and/or Complexity of Data Reviewed  Independent Historian: spouse  Labs: ordered. Decision-making details documented in ED Course.     Details: All reviewed   Radiology: ordered and independent interpretation performed. Decision-making details documented in ED Course.     Details: All reviewed   Discussion of management or test interpretation with external provider(s): Extensive  amount of er md thought complexity and workup     Risk  Prescription drug management.  Parenteral controlled substances.  Decision regarding hospitalization.             Disposition  Final diagnoses:   Pyelonephritis of right kidney     Time reflects when diagnosis was documented in both MDM as applicable and the Disposition within this note       Time User Action Codes Description Comment    1/12/2024 12:38 AM Armin Yost Add [N12] Pyelonephritis of right kidney           ED Disposition       ED  Disposition   Discharge    Condition   Stable    Date/Time   Fri Jan 12, 2024 12:53 AM    Comment   Sania Dixon discharge to home/self care.                   Follow-up Information    None         Discharge Medication List as of 1/12/2024 12:59 AM        START taking these medications    Details   cefdinir (OMNICEF) 300 mg capsule Take 1 capsule (300 mg total) by mouth every 12 (twelve) hours for 20 doses, Starting Fri 1/12/2024, Until Mon 1/22/2024, Normal      ondansetron (Zofran) 4 mg tablet Take 2 tablets (8 mg total) by mouth every 8 (eight) hours as needed for nausea or vomiting for up to 10 doses Zofran odt 2 tablets dissolve in the mouth  every 6-8 hrs as needed, Starting Fri 1/12/2024, Normal      oxyCODONE (Roxicodone) 5 immediate release tablet Take 1 tablet (5 mg total) by mouth every 6 (six) hours as needed for moderate pain or severe pain for up to 10 doses Max Daily Amount: 20 mg, Starting Fri 1/12/2024, Normal           CONTINUE these medications which have NOT CHANGED    Details   Calcium Carb-Cholecalciferol 600-10 MG-MCG TABS TAKE 1 TABLET BY MOUTH EVERY DAY, Normal      estradiol (ESTRACE) 2 MG tablet TAKE 1 TABLET BY MOUTH EVERY DAY, Starting Tue 8/29/2023, Normal             No discharge procedures on file.    PDMP Review         Value Time User    PDMP Reviewed  Yes 10/11/2021  8:45 PM Toi James PA-C            ED Provider  Electronically Signed by             Armin Yost MD  01/16/24 9376

## 2024-05-07 ENCOUNTER — HOSPITAL ENCOUNTER (OUTPATIENT)
Facility: HOSPITAL | Age: 54
Discharge: HOME | End: 2024-05-07

## 2024-05-07 DIAGNOSIS — Z12.31 ENCOUNTER FOR SCREENING MAMMOGRAM FOR MALIGNANT NEOPLASM OF BREAST: ICD-10-CM

## 2024-06-06 ENCOUNTER — HOSPITAL ENCOUNTER (OUTPATIENT)
Dept: RADIOLOGY | Facility: IMAGING CENTER | Age: 54
End: 2024-06-06
Payer: COMMERCIAL

## 2024-06-06 VITALS — WEIGHT: 179 LBS | BODY MASS INDEX: 37.57 KG/M2 | HEIGHT: 58 IN

## 2024-06-06 PROCEDURE — 77067 SCR MAMMO BI INCL CAD: CPT

## 2024-06-06 PROCEDURE — 77063 BREAST TOMOSYNTHESIS BI: CPT

## 2024-10-15 DIAGNOSIS — N95.1 MENOPAUSAL SYMPTOM: ICD-10-CM

## 2024-10-16 RX ORDER — ESTRADIOL 2 MG/1
2 TABLET ORAL DAILY
Qty: 90 TABLET | Refills: 1 | Status: SHIPPED | OUTPATIENT
Start: 2024-10-16

## 2024-12-09 ENCOUNTER — OFFICE VISIT (OUTPATIENT)
Dept: FAMILY MEDICINE CLINIC | Facility: CLINIC | Age: 54
End: 2024-12-09
Payer: COMMERCIAL

## 2024-12-09 VITALS
HEART RATE: 65 BPM | DIASTOLIC BLOOD PRESSURE: 80 MMHG | TEMPERATURE: 98 F | OXYGEN SATURATION: 99 % | SYSTOLIC BLOOD PRESSURE: 130 MMHG | HEIGHT: 58 IN | RESPIRATION RATE: 16 BRPM | BODY MASS INDEX: 35.26 KG/M2 | WEIGHT: 168 LBS

## 2024-12-09 DIAGNOSIS — Z12.11 COLON CANCER SCREENING: ICD-10-CM

## 2024-12-09 DIAGNOSIS — Z00.01 ENCOUNTER FOR GENERAL ADULT MEDICAL EXAMINATION WITH ABNORMAL FINDINGS: Primary | ICD-10-CM

## 2024-12-09 DIAGNOSIS — Z78.0 ASYMPTOMATIC MENOPAUSAL STATE: ICD-10-CM

## 2024-12-09 DIAGNOSIS — Z23 NEED FOR SHINGLES VACCINE: ICD-10-CM

## 2024-12-09 PROCEDURE — 99396 PREV VISIT EST AGE 40-64: CPT | Performed by: FAMILY MEDICINE

## 2024-12-09 RX ORDER — ZOSTER VACCINE RECOMBINANT, ADJUVANTED 50 MCG/0.5
0.5 KIT INTRAMUSCULAR ONCE
Qty: 1 EACH | Refills: 1 | Status: SHIPPED | OUTPATIENT
Start: 2024-12-09 | End: 2024-12-09

## 2024-12-09 RX ORDER — CALCIUM CARBONATE/VITAMIN D3 600 MG-10
1 TABLET ORAL DAILY
Qty: 90 TABLET | Refills: 3 | Status: SHIPPED | OUTPATIENT
Start: 2024-12-09

## 2024-12-09 NOTE — PROGRESS NOTES
Ambulatory Visit  Name: Sania Dixon      : 1970      MRN: [id number]  Encounter Provider: Hank Velasco MD  Encounter Date: 2024   Encounter department: [facility] CARE [facility]    Assessment & Plan  Encounter for general adult medical examination with abnormal findings  1. Routine Health Maintenance:  - Due for annual laboratory studies including lipid panel, comprehensive metabolic panel, and blood glucose  - Colonoscopy referral to gastroenterology for colorectal cancer screening    2. Immunizations:  - Shingles vaccine due - prescription provided for pharmacy administration  - Schedule includes two doses, second dose due in 2 months    3. 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 Follow-up:  - Complete ordered laboratory studies  - Schedule colonoscopy appointment  - Return to clinic as needed or for next annual physical  Orders:    Lipid Panel with Direct LDL reflex; Future    Colon cancer screening    Orders:    Ambulatory referral to Gastroenterology; Future    Need for shingles vaccine    Orders:    Zoster Vac Recomb Adjuvanted (Shingrix) 50 MCG/0.5ML SUSR; Inject 0.5 mL into a muscle once for 1 dose Repeat dose in 2 to 6 months       History of Present Illness     HPI  Chief Complaint   Patient presents with    Physical Exam    54-year-old female presents for annual physical examination. 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 Denies current complaints including headache, cough, urinary problems, or digestive issues. No blood in stool. No musculoskeletal complaints. Patient has been non-compliant with previous colonoscopy orders. Initially resistant to stool testing, but agrees to proceed with colonoscopy for colorectal cancer screening.  History obtained from patient.    Review of Systems  Past Medical History:   Diagnosis Date     "Cholelithiasis     Chronic kidney disease     born with 1 kidney, right    Colon polyp     COVID-19 2020    GERD (gastroesophageal reflux disease)     H. pylori infection 09/2018    Kidney stone     Migraines     Obesity     Pre-diabetes      Past Surgical History:   Procedure Laterality Date    CHOLECYSTECTOMY      COLONOSCOPY      EGD AND COLONOSCOPY  09/2018    positive h pylori, colon polyp, repeat 5 yrs    HYSTERECTOMY      KNEE SURGERY Left     meniscus in her 40s    CO COLONOSCOPY FLX DX W/COLLJ SPEC WHEN PFRMD N/A 09/12/2018    Procedure: EGD AND COLONOSCOPY;  Surgeon: Chang Wang MD;  Location: [facility] GI LAB;  Service: Gastroenterology    CO CYSTO BLADDER W/URETERAL CATHETERIZATION Right 01/21/2018    Procedure: CYSTOSCOPY; BILATERAL RETROGRADE PYELOGRAM WITH INSERTION STENT RIGHT URETERAL;  Surgeon: Phuc Zelaya MD;  Location:  MAIN OR;  Service: Urology    CO CYSTO/URETERO W/LITHOTRIPSY &INDWELL STENT INSRT Right 02/16/2018    Procedure: CYSTOSCOPY RIGHT URETEROSCOPY WITH , RIGTH RETROGRADE PYELOGRAM AND IRIGHT URETERAL STENT EXCHANGE;  Surgeon: Slim Reyna MD;  Location: [facility];  Service: Urology    CO LAPAROSCOPY SURG CHOLECYSTECTOMY N/A 10/30/2018    Procedure: LAPAROSCOPIC CHOLECYSTECTOMY;  Surgeon: Ye Ward DO;  Location:  MAIN OR;  Service: General           Objective     /80 (BP Location: Left arm, Patient Position: Sitting, Cuff Size: Standard)   Pulse 65   Temp 98 °F (36.7 °C) (Tympanic)   Resp 16   Ht 4' 10\" (1.473 m)   Wt 76.2 kg (168 lb)   SpO2 99%   BMI 35.11 kg/m²     Physical Exam  Non distress, normal respiratory effort. Pulse is regular. Heart without murmurs.     I have spent 35 minutes with Sania today in which greater than 50% of this time was spent in counseling/coordination of care regarding Diagnostic results, Prognosis, Risks and benefits of tx options, Counseling / Coordination of care, Reviewing / ordering tests, medicine, procedures.  Please note " this time includes cumulative time on the day of encounter, including reviewing medical records and/or coordinating care among the patient's other specialists.

## 2024-12-21 ENCOUNTER — APPOINTMENT (OUTPATIENT)
Dept: LAB | Facility: CLINIC | Age: 54
End: 2024-12-21
Payer: COMMERCIAL

## 2024-12-21 DIAGNOSIS — Z00.01 ENCOUNTER FOR GENERAL ADULT MEDICAL EXAMINATION WITH ABNORMAL FINDINGS: ICD-10-CM

## 2024-12-21 LAB
CHOLEST SERPL-MCNC: 220 MG/DL (ref ?–200)
HDLC SERPL-MCNC: 57 MG/DL
LDLC SERPL CALC-MCNC: 139 MG/DL (ref 0–100)
TRIGL SERPL-MCNC: 121 MG/DL (ref ?–150)

## 2024-12-21 PROCEDURE — 36415 COLL VENOUS BLD VENIPUNCTURE: CPT

## 2024-12-21 PROCEDURE — 80061 LIPID PANEL: CPT

## 2024-12-26 ENCOUNTER — RESULTS FOLLOW-UP (OUTPATIENT)
Dept: FAMILY MEDICINE CLINIC | Facility: CLINIC | Age: 54
End: 2024-12-26

## 2024-12-26 NOTE — RESULT ENCOUNTER NOTE
Dear Sania, The lab results show that your total cholesterol is 220 mg/dL, with the bad cholesterol (LDL) at 139 mg/dL. However, you have a high level of HDL, the good cholesterol, which provides protection against high LDL cholesterol. Therefore, you will not require any treatment at this time. We do need to discuss lifestyle modifications, including increased exercise and reducing your intake of saturated fats. These changes will also help keep your remaining kidney healthy. Best regards,

## 2025-02-06 ENCOUNTER — TELEPHONE (OUTPATIENT)
Age: 55
End: 2025-02-06

## 2025-02-06 NOTE — TELEPHONE ENCOUNTER
Patient son calls to speak with PCP regarding the patient memory and thinking. Patient's son wanted to speak with the PCP immediately. I was able to warm transfer to the office.

## 2025-02-07 ENCOUNTER — OFFICE VISIT (OUTPATIENT)
Dept: FAMILY MEDICINE CLINIC | Facility: CLINIC | Age: 55
End: 2025-02-07
Payer: COMMERCIAL

## 2025-02-07 VITALS
SYSTOLIC BLOOD PRESSURE: 134 MMHG | DIASTOLIC BLOOD PRESSURE: 82 MMHG | HEIGHT: 58 IN | BODY MASS INDEX: 34.43 KG/M2 | RESPIRATION RATE: 20 BRPM | HEART RATE: 71 BPM | TEMPERATURE: 97.1 F | OXYGEN SATURATION: 99 % | WEIGHT: 164 LBS

## 2025-02-07 DIAGNOSIS — Z13.31 POSITIVE SCREENING FOR DEPRESSION ON 2-ITEM PATIENT HEALTH QUESTIONNAIRE (PHQ-2): ICD-10-CM

## 2025-02-07 DIAGNOSIS — R41.3 MEMORY CHANGE: Primary | ICD-10-CM

## 2025-02-07 PROBLEM — F32.2 SEVERE MAJOR DEPRESSIVE DISORDER (HCC): Status: ACTIVE | Noted: 2025-02-07

## 2025-02-07 PROCEDURE — 99213 OFFICE O/P EST LOW 20 MIN: CPT

## 2025-02-07 NOTE — PROGRESS NOTES
Name: Sania Dixon      : 1970      MRN: [id number]  Encounter Provider: Darryl Ledezma MD  Encounter Date: 2025   Encounter department: [facility] CARE [facility]  :  Assessment & Plan  Memory change  Differential diagnosis includes dementia versus undetected stroke versus thyroid dysfunction versus B12 deficiency.  Given history, physical exam, and patient's age I will be placing orders for preliminary lab work as noted below.  I will also be requesting CT head with and without contrast and have placed referral to neurology for more in-depth memory testing.  Given patient's memory impairment I will be reaching out to [employer] about my concerns until we determine the underlying cause of patient's memory dysfunction, family in agreement and state they will be driving her to where she needs to go.  Lastly I have reviewed safety precautions with family, patient has given permission for her  to be notified of health plans (documentation to be added to chart), and I will be giving patient work note for 1 week so she can complete initial testing and hopefully be seen by specialist.    Orders:    Comprehensive metabolic panel; Future    Vitamin B12; Future    RPR (DX) W/REFL TITER AND CONFIRM TESTING (REFL); Future    CT head w wo contrast; Future    Ambulatory Referral to Neurology; Future    TSH w/Reflex; Future    UA (URINE) with reflex to Scope; Future    Positive screening for depression on 2-item Patient Health Questionnaire (PHQ-2)  Mild depression screening.  Can be contributing to memory change however we will need to rule out organic causes.  Further management per memory change.              History of Present Illness   Very pleasant 54-year-old female with history of congenital absence of left kidney, moderate mixed hyperlipidemia, and dyspepsia presenting to clinic accompanied by her , son, and close friend for evaluation of memory loss.  Family is  concerned that patient's memory has greatly declined in the past couple months.  This change was most notable over the past 4 months but patient's  states that her sister began noting a change over the past year.  Per patient's son Mrs. Dixon frequently repeats herself and is no longer engaging in significant conversation but rather keeping more to yes or no answers.  He has noted that her short-term memory has been the most affected often times forgetting things she has completed that very morning.  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 Dixon has also been having issues at work.  She states that she was approached by patient's manager who was asking if there was anything troubling Mrs. Dixon as she has begun to have poor performance in her job and often time forgetting tasks that were of importance.  She states that coworkers have also approached her asking if everything is okay as they too have noted changes throughout conversations had with Mrs. Dixon.  Close friend also recalls incident where she was traveling with patient to the grocery store (one they frequented) and patient took a wrong turn/entrance.  Patient's  also states that patient occasionally misses stop signs and most recently received speeding ticket.  Patient denies any visual or auditory hallucinations.  Family is denying any recent trauma to head, history of head surgeries, change in diet, or recent traumatic event.  Patient did travel to [place] back in May 2024 however there were no activities where patient ate anything out of her norm/raw food nor had her head submersed in body of water such as lake/ocean.  There is a strong family history of Alzheimer's with patient's mother being diagnosed with Alzheimer's at age 66 and her brother being diagnosed at age 54.  Patient's son does note that he has not seen much change in her appetite but is not sure as he is typically out for  "work.      Review of Systems   Constitutional:  Negative for chills and fever.   Respiratory:  Negative for cough and shortness of breath.    Cardiovascular:  Negative for chest pain.   Gastrointestinal:  Negative for abdominal pain, blood in stool, constipation, diarrhea, nausea and vomiting.   Neurological:  Positive for headaches. Negative for dizziness, tremors, seizures, syncope, weakness and numbness.       Objective   /82 (BP Location: Left arm, Patient Position: Sitting, Cuff Size: Standard)   Pulse 71   Temp (!) 97.1 °F (36.2 °C) (Temporal)   Resp 20   Ht 4' 10\" (1.473 m)   Wt 74.4 kg (164 lb)   SpO2 99%   BMI 34.28 kg/m²      Physical Exam  Vitals and nursing note reviewed.   Constitutional:       General: She is not in acute distress.     Appearance: Normal appearance. She is not ill-appearing, toxic-appearing or diaphoretic.   HENT:      Head: Normocephalic.   Eyes:      General: No scleral icterus.        Right eye: No discharge.         Left eye: No discharge.      Extraocular Movements: Extraocular movements intact.      Conjunctiva/sclera: Conjunctivae normal.      Pupils: Pupils are equal, round, and reactive to light.   Cardiovascular:      Rate and Rhythm: Normal rate and regular rhythm.      Pulses: Normal pulses.      Heart sounds: Normal heart sounds.   Pulmonary:      Effort: Pulmonary effort is normal.      Breath sounds: Normal breath sounds.   Musculoskeletal:         General: Normal range of motion.   Skin:     General: Skin is warm and dry.   Neurological:      General: No focal deficit present.      Mental Status: She is alert.      Cranial Nerves: Cranial nerves 2-12 are intact. No cranial nerve deficit, dysarthria or facial asymmetry.      Sensory: Sensation is intact.      Motor: Motor function is intact. No weakness, tremor, atrophy, abnormal muscle tone, seizure activity or pronator drift.      Coordination: Coordination is intact. Romberg sign negative. Coordination " normal. Finger-Nose-Finger Test and Heel to Shin Test normal. Rapid alternating movements normal.      Gait: Gait is intact. Gait normal.      Comments: Able to state month and day but wrong year (1900s).  Able to name current [place] state and [place] president.  Not able to name interviewer's job/profession.   Psychiatric:         Attention and Perception: She is inattentive. She does not perceive auditory or visual hallucinations.         Mood and Affect: Mood normal.         Speech: Speech is not rapid and pressured or slurred.         Behavior: Behavior is not agitated, slowed, aggressive, withdrawn, hyperactive or combative. Behavior is cooperative.         Cognition and Memory: She exhibits impaired recent memory.      Comments: Requiring redirection while completing neurology exam

## 2025-02-07 NOTE — LETTER
February 7, 2025     Patient: Sania Dixon  YOB: 1970  Date of Visit: 2/7/2025      To Whom it May Concern:    Sania Dixon is under my professional care. Sania was seen in my office on 2/7/2025. Sania may return to work on 2/15/2025 .    If you have any questions or concerns, please don't hesitate to call.         Sincerely,          Darryl Ledezma MD        CC: No Recipients

## 2025-02-07 NOTE — PATIENT INSTRUCTIONS
"Patient Education     Evaluar los problemas de memoria y razonamiento   Conceptos Básicos   Redactado por los médicos y editores de [employer]   ¿Qué significa \"evaluar los problemas de memoria y razonamiento\"? -- Significa hacer un control de alaniz memoria y alaniz razonamiento. \"Evaluación\" es sinónimo de control o prueba.  Alaniz médico puede hacerle suraj evaluación si usted o alaniz minnie ha notado que tiene problemas de memoria o razonamiento, o al hacer actividades diarias.  La evaluación puede determinar:   Si tiene problemas de memoria o de razonamiento   Los tipos de problemas que tiene y alaniz gravedad  Además, la evaluación podría determinar la causa de los problemas. Existen diferentes padecimientos que pueden causar problemas de memoria y razonamiento.  Algunas personas se someten a suraj jd evaluación, luz elena otras se someten a varias evaluaciones si hari problemas empeoran con el paso del tiempo.  ¿Cuál puede ser la causa de los problemas de memoria o razonamiento? -- Es normal que los adultos tengan problemas leves de memoria a medida que envejecen, luz elena algunas personas tienen problemas de memoria o razonamiento más graves. [place] podría indicar que la causa no es el envejecimiento normal.  Algunas de las causas de los problemas de memoria y razonamiento se pueden revertir o tratar, por ejemplo:   Efectos secundarios de ciertas medicinas   No recibir suficiente cantidad de ciertas vitaminas, especialmente la vitamina B12   Un padecimiento llamado \"hipotiroidismo\", en el que el cuerpo no produce suficiente hormona tiroidea  Otras cosas que pueden causar problemas de memoria o razonamiento son:   Algunos problemas mentales - Entre ellos se cuentan la depresión y la ansiedad.   Un accidente cerebrovascular (derrame) o suraj lesión cerebral - En erica wolfgang, los síntomas suelen comenzar de repente en vez de hacerlo gradualmente.   Un tumor o suraj infección en el cerebro - Esta causa es menos frecuente.   Demencia - Es el término " "que se usa para referirse a un laura de enfermedades cerebrales que afectan la memoria y el razonamiento. La enfermedad de Alzheimer es un tipo de demencia.  ¿En qué consiste suraj evaluación? -- La evaluación se divide en varias partes, las cuales podrían completarse en un solo día o en varios, e incluyen:   Hablar con alaniz médico o enfermero - Alaniz médico o enfermero hablará con usted y alaniz minnie acerca de los síntomas, el comportamiento y las actividades diarias. Le preguntará acerca de cualquier cambio en los síntomas y alaniz comportamiento. También le preguntará acerca de cualquier problema de gabo, alaniz estado de ánimo, las medicinas que tome, y alaniz consumo de drogas y alcohol.   Un examen físico   Pruebas de memoria y razonamiento - El médico o enfermero le hace preguntas para evaluar alaniz memoria y alaniz razonamiento. Por ejemplo, podría decirle 3 palabras y pedirle que trate de recordarlas por unos minutos.  También es posible que consulte a otra persona para hacerse exámenes más detallados, los cuales se llaman pruebas \"neuropsicológicas\" ya que se realizan para obtener más información sobre cómo están funcionando alaniz mente y alaniz cerebro. Consiste en probar qué tan chet puede:   Hablar   Escribir y entender el lenguaje   Aprender y recordar información   Usar el razonamiento y la lógica   Hacer tareas relacionadas con las matemáticas y los números  [place] tipo de pruebas detalladas pueden llevar de 1 a varias horas. Por lo general, las realiza un médico llamado \"neuropsicólogo\".   Pruebas de rudi - Estas pruebas pueden detectar otros padecimientos que podrían estar causando los síntomas.   Suraj tomografía o suraj resonancia magnética nuclear del cerebro - Estos estudios de imagen permiten generar imágenes del cerebro. No todas las personas deben someterse a suraj tomografía de cerebro. Eso depende de hari síntomas y de los resultados del examen físico.  ¿Por qué es importante someterse a suraj evaluación? -- Es importante " someterse a suraj evaluación porque algunos problemas de memoria y razonamiento pueden tratarse. Después del tratamiento, los síntomas con frecuencia mejoran.  Otro motivo por el que la evaluación es importante es que algunas pruebas pueden repetirse con el tiempo, lo que les marjorie información sobre alaniz padecimiento a usted y a alaniz médico. Por ejemplo, si las pruebas indican problemas leves que no están empeorando, es suraj buena señal.  Si los resultados de las pruebas indican problemas que están empeorando con el tiempo, podría ser un signo de que posiblemente esté desarrollando demencia. Demencia es el término general que se usa para referirse a un laura de enfermedades cerebrales, entre ellas la enfermedad de Alzheimer, que afectan la memoria y el pensamiento. Si chet la mayoría de las formas de demencia no puede tratarse, saber que está en riesgo de padecerla puede ser útil. Por ejemplo, si sabe que hari problemas cristopher vez afecten algunas de hari tareas o actividades diarias, puede recibir ayuda con esas cosas. Además, si está al tanto de que no es probable que alaniz padecimiento mejore, usted y alaniz minnie pueden hacer planes para el futuro.  Todos los artículos se actualizan a medida que se descubre nueva evidencia y culmina nuestro proceso de evaluación por homólogos   [place] artículo se recuperó de Up[employer] el: Feb 26, 2024.  Artículo 35738 Versión [id number].[id number]  Release: 32.2.4 - C32.56  © 2024 [employer] Inc. Todos los derechos reservados.  Exención de responsabilidad y uso de la información del consumidor   Descargo de responsabilidad: esta información generalizada es un resumen limitado de información sobre el diagnóstico, el tratamiento y/o los medicamentos. No pretende ser exhaustiva y se debe utilizar brown herramienta para ayudar al usuario a comprender y/o evaluar las posibles opciones de diagnóstico y tratamiento. No incluye toda la información sobre afecciones, tratamientos, medicamentos, efectos secundarios o  riesgos puedan ser aplicables a un paciente específico. No tiene el propósito de servir brown recomendación médica ni de sustituir la recomendación médica, el diagnóstico o el tratamiento de un profesional de atención médica que se base en el examen y la evaluación de erica profesional de la gabo respecto a las circunstancias específicas y únicas del paciente. Los pacientes deben hablar con un profesional de atención médica para obtener información completa sobre alaniz gabo, cuestiones médicas y opciones de tratamiento, incluidos los riesgos o los beneficios relacionados con el uso de medicamentos. Esta información no certifica que los tratamientos o medicamentos kem seguros, eficaces o estén aprobados para tratar a un paciente específico. [employer] y hari afiliados renuncian a cualquier garantía o responsabilidad relacionada con esta información o el uso de la misma.El uso de esta información está sujeto a las Condiciones de uso, disponibles en https://www.[employer].com/en/know/clinical-effectiveness-terms. 2024© [employer] y hari afiliados y/o licenciantes. Todos los derechos reservados.  Copyright   © 2024 [employer] Todos los derechos reservados.

## 2025-02-08 ENCOUNTER — APPOINTMENT (OUTPATIENT)
Dept: LAB | Facility: CLINIC | Age: 55
End: 2025-02-08
Payer: COMMERCIAL

## 2025-02-08 DIAGNOSIS — R41.3 MEMORY CHANGE: ICD-10-CM

## 2025-02-08 LAB
ALBUMIN SERPL BCG-MCNC: 4.1 G/DL (ref 3.5–5)
ALP SERPL-CCNC: 60 U/L (ref 34–104)
ALT SERPL W P-5'-P-CCNC: 17 U/L (ref 7–52)
ANION GAP SERPL CALCULATED.3IONS-SCNC: 4 MMOL/L (ref 4–13)
AST SERPL W P-5'-P-CCNC: 14 U/L (ref 13–39)
BACTERIA UR QL AUTO: ABNORMAL /HPF
BILIRUB SERPL-MCNC: 0.54 MG/DL (ref 0.2–1)
BILIRUB UR QL STRIP: NEGATIVE
BUN SERPL-MCNC: 11 MG/DL (ref 5–25)
CALCIUM SERPL-MCNC: 9.2 MG/DL (ref 8.4–10.2)
CHLORIDE SERPL-SCNC: 108 MMOL/L (ref 96–108)
CLARITY UR: ABNORMAL
CO2 SERPL-SCNC: 29 MMOL/L (ref 21–32)
COLOR UR: YELLOW
CREAT SERPL-MCNC: 0.89 MG/DL (ref 0.6–1.3)
GFR SERPL CREATININE-BSD FRML MDRD: 73 ML/MIN/1.73SQ M
GLUCOSE P FAST SERPL-MCNC: 107 MG/DL (ref 65–99)
GLUCOSE UR STRIP-MCNC: NEGATIVE MG/DL
HGB UR QL STRIP.AUTO: NEGATIVE
KETONES UR STRIP-MCNC: NEGATIVE MG/DL
LEUKOCYTE ESTERASE UR QL STRIP: ABNORMAL
MUCOUS THREADS UR QL AUTO: ABNORMAL
NITRITE UR QL STRIP: POSITIVE
NON-SQ EPI CELLS URNS QL MICRO: ABNORMAL /HPF
PH UR STRIP.AUTO: 6 [PH]
POTASSIUM SERPL-SCNC: 4.3 MMOL/L (ref 3.5–5.3)
PROT SERPL-MCNC: 6.9 G/DL (ref 6.4–8.4)
PROT UR STRIP-MCNC: ABNORMAL MG/DL
RBC #/AREA URNS AUTO: ABNORMAL /HPF
SODIUM SERPL-SCNC: 141 MMOL/L (ref 135–147)
SP GR UR STRIP.AUTO: 1.02 (ref 1–1.03)
TREPONEMA PALLIDUM IGG+IGM AB [PRESENCE] IN SERUM OR PLASMA BY IMMUNOASSAY: NORMAL
TSH SERPL DL<=0.05 MIU/L-ACNC: 1.79 UIU/ML (ref 0.45–4.5)
UROBILINOGEN UR STRIP-ACNC: <2 MG/DL
VIT B12 SERPL-MCNC: 411 PG/ML (ref 180–914)
WBC #/AREA URNS AUTO: ABNORMAL /HPF

## 2025-02-08 PROCEDURE — 86780 TREPONEMA PALLIDUM: CPT

## 2025-02-08 PROCEDURE — 84443 ASSAY THYROID STIM HORMONE: CPT

## 2025-02-08 PROCEDURE — 36415 COLL VENOUS BLD VENIPUNCTURE: CPT

## 2025-02-08 PROCEDURE — 81001 URINALYSIS AUTO W/SCOPE: CPT

## 2025-02-08 PROCEDURE — 82607 VITAMIN B-12: CPT

## 2025-02-08 PROCEDURE — 80053 COMPREHEN METABOLIC PANEL: CPT

## 2025-02-08 NOTE — ASSESSMENT & PLAN NOTE
Mild depression screening.  Can be contributing to memory change however we will need to rule out organic causes.  Further management per memory change.

## 2025-02-10 ENCOUNTER — RESULTS FOLLOW-UP (OUTPATIENT)
Dept: FAMILY MEDICINE CLINIC | Facility: CLINIC | Age: 55
End: 2025-02-10

## 2025-02-10 DIAGNOSIS — N39.0 URINARY TRACT INFECTION WITHOUT HEMATURIA, SITE UNSPECIFIED: Primary | ICD-10-CM

## 2025-02-10 RX ORDER — SULFAMETHOXAZOLE AND TRIMETHOPRIM 800; 160 MG/1; MG/1
1 TABLET ORAL 2 TIMES DAILY
Qty: 14 TABLET | Refills: 0 | Status: SHIPPED | OUTPATIENT
Start: 2025-02-10 | End: 2025-02-18

## 2025-02-14 ENCOUNTER — HOSPITAL ENCOUNTER (OUTPATIENT)
Dept: CT IMAGING | Facility: HOSPITAL | Age: 55
End: 2025-02-14
Payer: COMMERCIAL

## 2025-02-14 ENCOUNTER — TELEPHONE (OUTPATIENT)
Age: 55
End: 2025-02-14

## 2025-02-14 DIAGNOSIS — R41.3 MEMORY CHANGE: ICD-10-CM

## 2025-02-14 PROCEDURE — 70470 CT HEAD/BRAIN W/O & W/DYE: CPT

## 2025-02-14 RX ADMIN — IOHEXOL 75 ML: 350 INJECTION, SOLUTION INTRAVENOUS at 07:25

## 2025-02-14 NOTE — PROGRESS NOTES
Contacted by pt's son who is requesting work absence letter for pt as she continues to have memory impairment and is undergoing testing. She is scheduled to see Neurology on 4/1/2025. Given this information I can give work note to excuse pt from now until 4/2/2025. By then she should have seen the specialist and completed testing allowing for a better determination of pt's ability to return to work.

## 2025-02-14 NOTE — LETTER
February 14, 2025     Patient: Sania Dixon  YOB: 1970  Date of Visit: 2/14/2025      To Whom it May Concern:    Sania Dixon is under my professional care. Please excuse Sania Dixon from 2/14/2025 until 4/2/2025    If you have any questions or concerns, please don't hesitate to call.          Sincerely,          Darryl Ledezma MD        CC: No Recipients

## 2025-02-14 NOTE — TELEPHONE ENCOUNTER
I can give note until 4/2/2025. She has an appointment on 4/1/2025 with Neurology which will be better suited to give longer time off should she require it.

## 2025-02-14 NOTE — TELEPHONE ENCOUNTER
Patients son called in and is requesting a note for patient to be out of work from 2/14 until 4/8 until her next appointment while she continues to have scans done and appointment. Please advise

## 2025-02-18 ENCOUNTER — OFFICE VISIT (OUTPATIENT)
Dept: NEUROLOGY | Facility: CLINIC | Age: 55
End: 2025-02-18
Payer: COMMERCIAL

## 2025-02-18 VITALS
RESPIRATION RATE: 98 BRPM | DIASTOLIC BLOOD PRESSURE: 60 MMHG | WEIGHT: 165 LBS | HEART RATE: 74 BPM | OXYGEN SATURATION: 98 % | HEIGHT: 58 IN | SYSTOLIC BLOOD PRESSURE: 120 MMHG | BODY MASS INDEX: 34.63 KG/M2

## 2025-02-18 DIAGNOSIS — R41.89 MODERATE COGNITIVE IMPAIRMENT: Primary | ICD-10-CM

## 2025-02-18 PROCEDURE — 99205 OFFICE O/P NEW HI 60 MIN: CPT | Performed by: PSYCHIATRY & NEUROLOGY

## 2025-02-18 NOTE — PATIENT INSTRUCTIONS
-- MRI Brain NeuroQuant w wo contrast   -- Refrain from driving  -- I will order Neuropsychologic testing to look into your memory. Some options to get a sooner neuropsychologic appointment are:   [facility] ([place]): [phone]  [facility] ([place]): [phone]  [facility]): [phone]  [facility]): [phone]

## 2025-02-18 NOTE — LETTER
February 18, 2025     Patient: Sania Dixon  YOB: 1970  Date of Visit: 2/18/2025      To Whom it May Concern:    Sania Dixon is under my professional care. Sania was seen in my office on 2/18/2025. Sania was seen in the office today for concerns of memory changes that have been going on for 6 months. We have recommended further work-up with MRI and neuropsych testing.    If you have any questions or concerns, please don't hesitate to call.         Sincerely,     Jose Vazquez, DO

## 2025-02-18 NOTE — PROGRESS NOTES
Name: Sania Dixon      : 1970      MRN: [id number]  Encounter Provider: Jose Vazquez DO  Encounter Date: 2025   Encounter department: [facility]  :  Assessment & Plan  Moderate cognitive impairment  Patient is a 54 year old female with a history of prediabetes, obesity, and hyperlipidemia who presents to the clinic today for evaluation and treatment of cognitive problems.  Patient's neurological examination is non-focal.    Given patient's HPI and MOCA examination (15/30) at today's visit, patient does warrant further work-up.  We recommended that patient get an MRI brain NeuroQuant and also get neuropsych testing done.  At this time, we will not recommend starting the patient on medications until further work-up is completed.  Patient and family were agreeable to this.    Patient was provided a work letter given that she has a hearing at work for whether or not she can continue to do her duties.     In regards to safety concerns, patient was recommended not to drive given the recent tickets that she is received and also was recommended to not cook alone on her own.  Regarding other safety concerns, patient's family was recommended to track her location, have cameras in the house, etc.       Plan:  MRI Brain NeuroQuant w wo contrast ordered  Neuropsych testing ordered  Patient recommended to refrain from driving  Patient provided a letter for work stating the reason for today's visit and testing ordered  Monitor for worsening symptoms  Call for any questions or concerns  The patient indicates understanding of these issues and agrees with the plan.  This patient case was discussed with Dr. Davis.  Return in about 3 months (around 2025).    Orders:    Ambulatory Referral to Neurology    MRI brain NeuroQuant wo and w contrast; Future    Ambulatory referral to Neuropsychology; Future      Patient Instructions   -- MRI Brain NeuroQuant w wo contrast   -- Refrain from  driving  -- I will order Neuropsychologic testing to look into your memory. Some options to get a sooner neuropsychologic appointment are:   [facility] ([place]): [phone]  [facility] ([place]): [phone]  [facility] ([place]): [phone]  [facility] ([place]): [phone]        History of Present Illness   HPI     Patient is a 54 year old female with a history of prediabetes, obesity, and hyperlipidemia who presents to the clinic today for evaluation and treatment of cognitive problems. She has a history of She is accompanied by spouse and sons . Primary caregiver is patient. Patient lives with their family.     Description of memory trouble  When did the memory difficulties begin?  6 months  ago  The memory problems affects changes in short term memory, recalling words, and repetition of questions  Family has not noticed paranoia, suspiciousness, hallucinations, delusional thinking, agitation, gait trouble, and balance trouble   How did the symptoms start? gradual  Over time the problem has gradually worsening  Who noticed the problem with your memory? patient, friend, son, , and co-workers  What type of things do you forget? names  Have there been accidents, injuries, embarrassments related to the memory problem? No  Do you have any problems operating household appliance such as TV remote, kitchen appliances, computer? Yes - having trouble using the computer  Do you have trouble finding the right word while speaking? Yes  Do you substitute a wrong word? No  Do you require repeat information or ask the same question repeatedly? Yes    Functional Assessment  She is independent in the following: ambulation, bathing and hygiene, feeding, continence, grooming, toileting, and dressing  Requires assistance with the following: No assistance needed right now  Have you had any recent accidents, citations or getting lost in familiar  places? Yes - driving to her [place] and forgot the directions, went into the exit for a store (going there was 10 years), [employer] in the parking lot and speeding ticket (no tickets before)  Do you handle your own financial affairs such as balancing your checkbook, paying bills, investments? Yes - but having trouble with things now at work  Medication administration: patient self medicates    Associated mood symptoms  Have you or your family noted any change in your mood or personality? Yes, been distant recently, been anxious, excessive sighing/yawning  Are you currently or have you been treated in the past for depression or anxiety? No    Other factors  Fluctuation in alertness? No  Is there any history of epilepsy? No  Has the patient lost personal awareness, and social awareness? No  Family member with dementia and what type? Yes - mother with AD, maybe brother and sister with memory changes (on-going evaluation)  Do you have history of myocardial infarction? No  Does patient have history of alcohol abuse? No     There are no other questions or concerns at this time.    Review of Systems   Neurological:  Negative for dizziness, tremors, seizures, syncope, facial asymmetry, speech difficulty, weakness, light-headedness, numbness and headaches.        Memory changes       Pertinent Medical History   Medical History Reviewed by provider this encounter:     .  Past Medical History   Past Medical History:   Diagnosis Date    Cholelithiasis     Chronic kidney disease     born with 1 kidney, right    Colon polyp     COVID-19 2020    GERD (gastroesophageal reflux disease)     H. pylori infection 09/2018    Kidney stone     Migraines     Obesity     Pre-diabetes      Past Surgical History:   Procedure Laterality Date    CHOLECYSTECTOMY      COLONOSCOPY      EGD AND COLONOSCOPY  09/2018    positive h pylori, colon polyp, repeat 5 yrs    HYSTERECTOMY      KNEE SURGERY Left     meniscus in her 40s    TN COLONOSCOPY  FLX DX W/COLLJ SPEC WHEN PFRMD N/A 09/12/2018    Procedure: EGD AND COLONOSCOPY;  Surgeon: Chang Wang MD;  Location: [facility];  Service: Gastroenterology    HI CYSTO/URETERO W/LITHOTRIPSY &INDWELL STENT INSRT Right 02/16/2018    Procedure: CYSTOSCOPY RIGHT URETEROSCOPY WITH , RIGTH RETROGRADE PYELOGRAM AND IRIGHT URETERAL STENT EXCHANGE;  Surgeon: Slim Reyna MD;  Location: [facility];  Service: Urology    HI CYSTOURETHROSCOPY W/URETERAL CATHETERIZATION Right 01/21/2018    Procedure: CYSTOSCOPY; BILATERAL RETROGRADE PYELOGRAM WITH INSERTION STENT RIGHT URETERAL;  Surgeon: Phuc Zelaya MD;  Location:  MAIN OR;  Service: Urology    HI LAPAROSCOPY SURG CHOLECYSTECTOMY N/A 10/30/2018    Procedure: LAPAROSCOPIC CHOLECYSTECTOMY;  Surgeon: Ye Ward DO;  Location:  MAIN OR;  Service: General     Family History   Problem Relation Age of Onset    Alzheimer's disease Mother     Schizophrenia Father     Bipolar disorder Father     Hypertension Father     No Known Problems Sister     No Known Problems Maternal Grandmother     No Known Problems Maternal Grandfather     No Known Problems Paternal Grandmother     No Known Problems Paternal Grandfather     No Known Problems Brother     No Known Problems Brother     Lymphoma Son 3        berkits lymphoma    No Known Problems Son     No Known Problems Son     No Known Problems Maternal Aunt     No Known Problems Maternal Aunt     No Known Problems Maternal Aunt     No Known Problems Paternal Aunt     No Known Problems Paternal Aunt     No Known Problems Paternal Aunt     No Known Problems Paternal Aunt       reports that she has never smoked. She has never used smokeless tobacco. She reports that she does not drink alcohol and does not use drugs.  Current Outpatient Medications   Medication Instructions    estradiol (ESTRACE) 2 mg, Oral, Daily    sulfamethoxazole-trimethoprim (BACTRIM DS) 800-160 mg per tablet 1 tablet, Oral, 2 times daily     Allergies   Allergen  "Reactions    Nitrofurantoin Shortness Of Breath and Hallucinations     Other reaction(s): Unknown Reaction    Reglan [Metoclopramide] Shortness Of Breath    Benadryl [Diphenhydramine] Palpitations      Current Outpatient Medications on File Prior to Visit   Medication Sig Dispense Refill    estradiol (ESTRACE) 2 MG tablet TAKE 1 TABLET BY MOUTH EVERY DAY 90 tablet 1    sulfamethoxazole-trimethoprim (BACTRIM DS) 800-160 mg per tablet Take 1 tablet by mouth 2 (two) times a day for 7 days 14 tablet 0    [DISCONTINUED] Calcium Carb-Cholecalciferol 600-10 MG-MCG TABS Take 1 tablet by mouth daily 90 tablet 3     No current facility-administered medications on file prior to visit.      Social History     Tobacco Use    Smoking status: Never    Smokeless tobacco: Never   Vaping Use    Vaping status: Never Used   Substance and Sexual Activity    Alcohol use: No    Drug use: No    Sexual activity: Yes     Partners: Male        Objective   /60 (BP Location: Right arm, Patient Position: Sitting, Cuff Size: Large)   Pulse 74   Resp (!) 98   Ht 4' 10\" (1.473 m)   Wt 74.8 kg (165 lb)   SpO2 98%   BMI 34.49 kg/m²     Physical Exam  Vitals reviewed.   Constitutional:       Appearance: Normal appearance.   HENT:      Head: Normocephalic and atraumatic.   Eyes:      General: Lids are normal.      Extraocular Movements: Extraocular movements intact.      Conjunctiva/sclera: Conjunctivae normal.      Pupils: Pupils are equal, round, and reactive to light.   Cardiovascular:      Rate and Rhythm: Normal rate.   Pulmonary:      Effort: Pulmonary effort is normal.   Musculoskeletal:         General: Normal range of motion.   Skin:     General: Skin is warm.   Neurological:      Mental Status: She is alert.      Motor: Motor strength is normal.     Coordination: Romberg sign negative.      Deep Tendon Reflexes:      Reflex Scores:       Tricep reflexes are 2+ on the right side and 2+ on the left side.       Bicep reflexes are 2+ " on the right side and 2+ on the left side.       Brachioradialis reflexes are 2+ on the right side and 2+ on the left side.       Patellar reflexes are 2+ on the right side and 2+ on the left side.  Psychiatric:         Mood and Affect: Mood normal.         Speech: Speech normal.         Behavior: Behavior normal.       Neurological Exam  Mental Status  Alert. Oriented only to person, place, time and situation. Speech is normal. Language is fluent with no aphasia.    Cranial Nerves  CN II: Visual fields full to confrontation.  CN III, IV, VI: Extraocular movements intact bilaterally. Normal lids and orbits bilaterally. Pupils equal round and reactive to light bilaterally.  CN V: Facial sensation is normal.  CN VII: Full and symmetric facial movement.  CN VIII: Hearing is normal.  CN IX, X: Palate elevates symmetrically. Normal gag reflex.  CN XI: Shoulder shrug strength is normal.  CN XII: Tongue midline without atrophy or fasciculations.    Motor  Normal muscle bulk throughout. No fasciculations present. Normal muscle tone. No abnormal involuntary movements. Strength is 5/5 throughout all four extremities.    Sensory  Light touch is normal in upper and lower extremities.     Reflexes                                            Right                      Left  Brachioradialis                    2+                         2+  Biceps                                 2+                         2+  Triceps                                2+                         2+  Patellar                                2+                         2+    Coordination  Right: Finger-to-nose normal.Left: Finger-to-nose normal.    Gait  Casual gait is normal including stance, stride, and arm swing. Romberg is absent.      Radiology Results Review: I have reviewed radiology reports from PACS including: MRI brain.    Administrative Statements   I have spent a total time of 45 minutes in caring for this patient on the day of the  visit/encounter including Impressions, Counseling / Coordination of care, Documenting in the medical record, Reviewing/placing orders in the medical record (including tests, medications, and/or procedures), Obtaining or reviewing history  , and Communicating with other healthcare professionals .

## 2025-02-19 ENCOUNTER — TELEPHONE (OUTPATIENT)
Age: 55
End: 2025-02-19

## 2025-02-19 NOTE — TELEPHONE ENCOUNTER
Patient's son Bobby (on consent form) called re: AVS. They did not receive it at pt's OV yesterday. Provided instruction on how to access AVS on [email or web address] portal. Son also requested OV notes as pt has a hearing at work and pt/family needs details of plan of treatment at this time. Son requests OV notes be emailed to him.      Clerical Team - please send 2/18/25 OV note to pt's son Bobby at the email address below:    [email or web address]    Thank you!

## 2025-02-21 ENCOUNTER — TELEPHONE (OUTPATIENT)
Age: 55
End: 2025-02-21

## 2025-02-21 NOTE — TELEPHONE ENCOUNTER
Writer called and spoke to patients  in regards to ROF. Writer let him know at this time we are not accepting new patient at this time. And that I can send out outside resources. Writer place resources in the mail to the address on file.

## 2025-02-27 NOTE — TELEPHONE ENCOUNTER
2/27/28 - Last office note from 2/18/25 signed by Dr. Davis    Called pt's son, Bobby,  to confirm email to send note- He stated he no longer needs it. He was appreciative of the call.

## 2025-03-06 ENCOUNTER — HOSPITAL ENCOUNTER (OUTPATIENT)
Dept: MRI IMAGING | Facility: HOSPITAL | Age: 55
End: 2025-03-06
Payer: COMMERCIAL

## 2025-03-06 DIAGNOSIS — R41.89 MODERATE COGNITIVE IMPAIRMENT: ICD-10-CM

## 2025-03-06 PROCEDURE — 70553 MRI BRAIN STEM W/O & W/DYE: CPT

## 2025-03-06 PROCEDURE — A9585 GADOBUTROL INJECTION: HCPCS

## 2025-03-06 RX ORDER — GADOBUTROL 604.72 MG/ML
7 INJECTION INTRAVENOUS
Status: COMPLETED | OUTPATIENT
Start: 2025-03-06 | End: 2025-03-06

## 2025-03-06 RX ADMIN — GADOBUTROL 7 ML: 604.72 INJECTION INTRAVENOUS at 11:16

## 2025-03-07 ENCOUNTER — RESULTS FOLLOW-UP (OUTPATIENT)
Age: 55
End: 2025-03-07

## 2025-03-07 DIAGNOSIS — R41.89 MODERATE COGNITIVE IMPAIRMENT: Primary | ICD-10-CM

## 2025-03-12 ENCOUNTER — PREP FOR PROCEDURE (OUTPATIENT)
Dept: GASTROENTEROLOGY | Facility: MEDICAL CENTER | Age: 55
End: 2025-03-12

## 2025-03-12 ENCOUNTER — TELEPHONE (OUTPATIENT)
Dept: GASTROENTEROLOGY | Facility: MEDICAL CENTER | Age: 55
End: 2025-03-12

## 2025-03-12 DIAGNOSIS — Z12.11 SCREENING FOR COLON CANCER: Primary | ICD-10-CM

## 2025-03-12 RX ORDER — POLYETHYLENE GLYCOL 3350 17 G/17G
POWDER, FOR SOLUTION ORAL
Qty: 238 G | Refills: 0 | Status: SHIPPED | OUTPATIENT
Start: 2025-03-12

## 2025-03-12 RX ORDER — BISACODYL 5 MG/1
TABLET, DELAYED RELEASE ORAL
Qty: 4 TABLET | Refills: 0 | Status: SHIPPED | OUTPATIENT
Start: 2025-03-12

## 2025-03-12 NOTE — TELEPHONE ENCOUNTER
Mailed prep instructions for Miralax/Dulcolax.    Can you please send Rx for Miralax/Dulcolax prep for patients pharmacy.

## 2025-03-17 ENCOUNTER — TELEPHONE (OUTPATIENT)
Age: 55
End: 2025-03-17

## 2025-03-17 NOTE — TELEPHONE ENCOUNTER
Son called in to inform they received msg from Dr Vazquez to see if they approve pt to start Aricept and he informed they do. He also responded to her my chart msg , which I fwd to Dr Vazquez already as well as informing he wants med sent to their [facility] in [place].

## 2025-03-20 RX ORDER — DONEPEZIL HYDROCHLORIDE 5 MG/1
10 TABLET, FILM COATED ORAL
Qty: 60 TABLET | Refills: 0 | Status: SHIPPED | OUTPATIENT
Start: 2025-03-20 | End: 2025-04-19

## 2025-03-26 NOTE — TELEPHONE ENCOUNTER
pt's son jonathon called and states that he saw message on [employer] regarding medication.  They miss read the instructions and she has been taking 5mg in am and 5mg at hs  Has been on this dose for 5-6 days now.    no side effects       Please advise    He is asking that we send [employer] message with response

## 2025-03-27 ENCOUNTER — TELEPHONE (OUTPATIENT)
Age: 55
End: 2025-03-27

## 2025-03-27 DIAGNOSIS — Z12.11 SCREENING FOR COLON CANCER: ICD-10-CM

## 2025-03-27 RX ORDER — POLYETHYLENE GLYCOL 3350 17 G/17G
POWDER, FOR SOLUTION ORAL
Qty: 238 G | Refills: 0 | Status: SHIPPED | OUTPATIENT
Start: 2025-03-27

## 2025-03-27 NOTE — TELEPHONE ENCOUNTER
Pt rescheduled colonoscopy for 5/1/25 w/ Dr. Milian at the [facility].    Pt has prep instructions that were mailed to her.

## 2025-03-27 NOTE — TELEPHONE ENCOUNTER
Patients GI provider:      Number to return call: [phone]    Reason for call: Pt requesting miralax be sent again to [facility] please. Thank you    Scheduled procedure/appointment date if applicable: Procedure 5/1/25

## 2025-04-08 ENCOUNTER — OFFICE VISIT (OUTPATIENT)
Dept: FAMILY MEDICINE CLINIC | Facility: CLINIC | Age: 55
End: 2025-04-08
Payer: COMMERCIAL

## 2025-04-08 VITALS
SYSTOLIC BLOOD PRESSURE: 102 MMHG | HEART RATE: 76 BPM | BODY MASS INDEX: 35.26 KG/M2 | HEIGHT: 58 IN | TEMPERATURE: 97.8 F | DIASTOLIC BLOOD PRESSURE: 70 MMHG | WEIGHT: 168 LBS | RESPIRATION RATE: 16 BRPM | OXYGEN SATURATION: 98 %

## 2025-04-08 DIAGNOSIS — G31.9 BRAIN ATROPHY (HCC): Primary | ICD-10-CM

## 2025-04-08 DIAGNOSIS — F03.B0 MODERATE DEMENTIA WITHOUT BEHAVIORAL DISTURBANCE, PSYCHOTIC DISTURBANCE, MOOD DISTURBANCE, OR ANXIETY, UNSPECIFIED DEMENTIA TYPE (HCC): ICD-10-CM

## 2025-04-08 PROCEDURE — 99214 OFFICE O/P EST MOD 30 MIN: CPT | Performed by: FAMILY MEDICINE

## 2025-04-08 NOTE — PATIENT INSTRUCTIONS
[facility] ([place]): [phone]  [facility] ([place]): [phone]  [facility] ([place]): [phone]  [facility]): [phone]

## 2025-04-08 NOTE — PROGRESS NOTES
Name: Sania Dixon      : 1970      MRN: [id number]  Encounter Provider: Hank Velasco MD  Encounter Date: 2025   Encounter department: [facility]    Assessment & Plan  Brain atrophy (HCC)  A CT scan revealed frontal and temporal atrophy, which could indicate various conditions, including frontotemporal dementia (FTD), although atrophy alone does not confirm a diagnosis. Factors such as the early stages of disease, the variability of FTD symptoms, and the brain's ability to compensate for atrophy may explain her condition. Other neurodegenerative diseases, psychiatric disorders, and alternative diagnoses could also account for similar imaging findings without classic FTD symptoms. A thorough clinical evaluation is crucial for an accurate diagnosis and management.        Moderate dementia without behavioral disturbance, psychotic disturbance, mood disturbance, or anxiety, unspecified dementia type (HCC)  The patient is a 54-year-old female experiencing cognitive difficulties, particularly a loss of executive function since late 2024, leading to increased dependence on her family for decision-making.  She will continue follow with Neurology for further studies and recommendations. I gave the contact information to patient's  for Neurophysiology to make appt as indicated by neurologist in the most recent visit.         Assessment & Plan                   Subjective     History of Present Illness  The patient is a 54-year-old female presenting for evaluation of dementia. She has been diagnosed with Moderate cognitive impairment and has been experiencing significant memory loss. She has been prescribed Aricept to be taken once daily to help prevent further memory loss. She is no longer able to drive and has been advised not to return to work. She has been experiencing difficulties with daily activities and requires assistance at home. Her condition  has been progressively worsening since November.    She has a follow-up appointment with a neurologist on May 13. An MRI performed on March 6 showed significant brain tissue loss, particularly in the frontal and temporal lobes, consistent with frontotemporal dementia and Alzheimer's disease. The patient has a family history of dementia, with her mother and brother also affected.    SOCIAL HISTORY  She is no longer able to work and requires assistance at home. Her granddaughter is currently providing care.    FAMILY HISTORY  Her mother and brother both had dementia.    MEDICATIONS      Results  Imaging  MRI performed on March 6 showed significant brain tissue loss, particularly in the frontal and temporal lobes, consistent with frontotemporal dementia and Alzheimer's disease.     Allergies   Allergen Reactions    Nitrofurantoin Shortness Of Breath and Hallucinations     Other reaction(s): Unknown Reaction    Reglan [Metoclopramide] Shortness Of Breath    Benadryl [Diphenhydramine] Palpitations     Patient Active Problem List   Diagnosis    Congenital absence of kidney, left    Dyspepsia    Fibrocystic disease of breast    Prediabetes    Obesity (BMI 35.0-39.9 without comorbidity)    Acute pain of right knee    Moderate mixed hyperlipidemia not requiring statin therapy    Menopause    Viral infection, unspecified    Positive screening for depression on 2-item Patient Health Questionnaire (PHQ-2)       Current Outpatient Medications:     bisacodyl (DULCOLAX) 5 mg EC tablet, Use as directed by office, Disp: 4 tablet, Rfl: 0    donepezil (ARICEPT) 5 mg tablet, Take 2 tablets (10 mg total) by mouth daily at bedtime Take 1 tab (5 mg) at bedtime for 1 month. Then, can increase to tabs (10 mg) at bedtime if tolerating the medication., Disp: 60 tablet, Rfl: 0    polyethylene glycol (GLYCOLAX) 17 GM/SCOOP powder, Take as directed as per written office instructions, Disp: 238 g, Rfl: 0    /70 (BP Location: Left arm,  "Patient Position: Sitting, Cuff Size: Standard)   Pulse 76   Temp 97.8 °F (36.6 °C) (Tympanic)   Resp 16   Ht 4' 10\" (1.473 m)   Wt 76.2 kg (168 lb)   SpO2 98%   BMI 35.11 kg/m²     Physical Exam  The patient appears to without distress; HEENT is unremarkable, the neck has no masses or enlarged lymph nodes. Respiratory effort is normal. Lung fields are clear; the heart has a normal rhythm without murmurs;  Neurological with no focal deficits, exam reveals significant memory loss and cognitive decline.                    "

## 2025-04-19 DIAGNOSIS — R41.89 MODERATE COGNITIVE IMPAIRMENT: ICD-10-CM

## 2025-04-21 RX ORDER — DONEPEZIL HYDROCHLORIDE 10 MG/1
10 TABLET, FILM COATED ORAL
Qty: 30 TABLET | Refills: 5 | Status: SHIPPED | OUTPATIENT
Start: 2025-04-21 | End: 2025-10-18

## 2025-04-21 NOTE — TELEPHONE ENCOUNTER
Patient called to request a refill for their donepezil  advised a refill was requested on 4/19/25 and is pending approval. Patient verbalized understanding and is in agreement.     Does the patient have enough for 3 days?   [] Yes   [x] No - Send as HP to POD

## 2025-05-01 ENCOUNTER — ANESTHESIA (OUTPATIENT)
Dept: GASTROENTEROLOGY | Facility: HOSPITAL | Age: 55
End: 2025-05-01
Payer: COMMERCIAL

## 2025-05-01 ENCOUNTER — ANESTHESIA EVENT (OUTPATIENT)
Dept: GASTROENTEROLOGY | Facility: HOSPITAL | Age: 55
End: 2025-05-01
Payer: COMMERCIAL

## 2025-05-01 ENCOUNTER — RA CDI HCC (OUTPATIENT)
Dept: OTHER | Facility: HOSPITAL | Age: 55
End: 2025-05-01

## 2025-05-01 ENCOUNTER — HOSPITAL ENCOUNTER (OUTPATIENT)
Dept: GASTROENTEROLOGY | Facility: HOSPITAL | Age: 55
Setting detail: OUTPATIENT SURGERY
End: 2025-05-01
Attending: INTERNAL MEDICINE
Payer: COMMERCIAL

## 2025-05-01 VITALS
RESPIRATION RATE: 15 BRPM | TEMPERATURE: 98.7 F | WEIGHT: 169 LBS | DIASTOLIC BLOOD PRESSURE: 64 MMHG | OXYGEN SATURATION: 98 % | HEART RATE: 60 BPM | SYSTOLIC BLOOD PRESSURE: 104 MMHG | BODY MASS INDEX: 35.32 KG/M2

## 2025-05-01 DIAGNOSIS — Z12.11 SCREENING FOR COLON CANCER: ICD-10-CM

## 2025-05-01 PROBLEM — F03.B0 MODERATE DEMENTIA (HCC): Status: ACTIVE | Noted: 2025-05-01

## 2025-05-01 PROBLEM — G31.9 BRAIN ATROPHY (HCC): Status: ACTIVE | Noted: 2025-05-01

## 2025-05-01 PROCEDURE — 45385 COLONOSCOPY W/LESION REMOVAL: CPT | Performed by: INTERNAL MEDICINE

## 2025-05-01 PROCEDURE — 88305 TISSUE EXAM BY PATHOLOGIST: CPT | Performed by: SPECIALIST

## 2025-05-01 RX ORDER — LIDOCAINE HYDROCHLORIDE 10 MG/ML
INJECTION, SOLUTION EPIDURAL; INFILTRATION; INTRACAUDAL; PERINEURAL AS NEEDED
Status: DISCONTINUED | OUTPATIENT
Start: 2025-05-01 | End: 2025-05-01

## 2025-05-01 RX ORDER — PROPOFOL 10 MG/ML
INJECTION, EMULSION INTRAVENOUS AS NEEDED
Status: DISCONTINUED | OUTPATIENT
Start: 2025-05-01 | End: 2025-05-01

## 2025-05-01 RX ORDER — SODIUM CHLORIDE, SODIUM LACTATE, POTASSIUM CHLORIDE, CALCIUM CHLORIDE 600; 310; 30; 20 MG/100ML; MG/100ML; MG/100ML; MG/100ML
INJECTION, SOLUTION INTRAVENOUS CONTINUOUS PRN
Status: DISCONTINUED | OUTPATIENT
Start: 2025-05-01 | End: 2025-05-01

## 2025-05-01 RX ADMIN — LIDOCAINE HYDROCHLORIDE 50 MG: 10 INJECTION, SOLUTION EPIDURAL; INFILTRATION; INTRACAUDAL; PERINEURAL at 11:20

## 2025-05-01 RX ADMIN — PROPOFOL 50 MG: 10 INJECTION, EMULSION INTRAVENOUS at 11:32

## 2025-05-01 RX ADMIN — PROPOFOL 110 MG: 10 INJECTION, EMULSION INTRAVENOUS at 11:20

## 2025-05-01 RX ADMIN — PROPOFOL 50 MG: 10 INJECTION, EMULSION INTRAVENOUS at 11:29

## 2025-05-01 RX ADMIN — PROPOFOL 50 MG: 10 INJECTION, EMULSION INTRAVENOUS at 11:35

## 2025-05-01 RX ADMIN — SODIUM CHLORIDE, SODIUM LACTATE, POTASSIUM CHLORIDE, AND CALCIUM CHLORIDE: .6; .31; .03; .02 INJECTION, SOLUTION INTRAVENOUS at 11:02

## 2025-05-01 RX ADMIN — PROPOFOL 50 MG: 10 INJECTION, EMULSION INTRAVENOUS at 11:26

## 2025-05-01 RX ADMIN — PROPOFOL 50 MG: 10 INJECTION, EMULSION INTRAVENOUS at 11:23

## 2025-05-01 NOTE — ANESTHESIA POSTPROCEDURE EVALUATION
Post-Op Assessment Note             Post Op Vitals Reviewed: Yes    No anethesia notable event occurred.    Staff: Anesthesiologist           Last Filed PACU Vitals:  Vitals Value Taken Time   Temp 98.7 °F (37.1 °C) 05/01/25 1145   Pulse 60 05/01/25 1205   /64 05/01/25 1205   Resp 15 05/01/25 1205   SpO2 98 % 05/01/25 1205       Modified Hernan:     Vitals Value Taken Time   Activity 2 05/01/25 1205   Respiration 2 05/01/25 1205   Circulation 2 05/01/25 1205   Consciousness 2 05/01/25 1205   Oxygen Saturation 2 05/01/25 1205     Modified Hernan Score: 10

## 2025-05-01 NOTE — H&P
History and Physical - [facility]  Sania Dixon 54 y.o. female MRN: [id number]                  HPI: Sania Dixon is a 54 y.o. year old female who presents for colonsocopy      REVIEW OF SYSTEMS: Per the HPI, and otherwise unremarkable.    Historical Information   Past Medical History:   Diagnosis Date    Cholelithiasis     Chronic kidney disease     born with 1 kidney, right    Colon polyp     COVID-19 2020    GERD (gastroesophageal reflux disease)     H. pylori infection 09/2018    Kidney stone     Migraines     Obesity     Pre-diabetes      Past Surgical History:   Procedure Laterality Date    CHOLECYSTECTOMY      COLONOSCOPY      EGD AND COLONOSCOPY  09/2018    positive h pylori, colon polyp, repeat 5 yrs    HYSTERECTOMY      KNEE SURGERY Left     meniscus in her 40s    MS COLONOSCOPY FLX DX W/COLLJ SPEC WHEN PFRMD N/A 09/12/2018    Procedure: EGD AND COLONOSCOPY;  Surgeon: Chang Wang MD;  Location: [facility];  Service: Gastroenterology    MS CYSTO/URETERO W/LITHOTRIPSY &INDWELL STENT INSRT Right 02/16/2018    Procedure: CYSTOSCOPY RIGHT URETEROSCOPY WITH , RIGTH RETROGRADE PYELOGRAM AND IRIGHT URETERAL STENT EXCHANGE;  Surgeon: Slim Reyna MD;  Location[facility];  Service: Urology    MS CYSTOURETHROSCOPY W/URETERAL CATHETERIZATION Right 01/21/2018    Procedure: CYSTOSCOPY; BILATERAL RETROGRADE PYELOGRAM WITH INSERTION STENT RIGHT URETERAL;  Surgeon: Phuc Zelaya MD;  Location:  MAIN OR;  Service: Urology    MS LAPAROSCOPY SURG CHOLECYSTECTOMY N/A 10/30/2018    Procedure: LAPAROSCOPIC CHOLECYSTECTOMY;  Surgeon: Ye Ward DO;  Location:  MAIN OR;  Service: General     Social History   Social History     Substance and Sexual Activity   Alcohol Use No     Social History     Substance and Sexual Activity   Drug Use No     Social History     Tobacco Use   Smoking Status Never   Smokeless Tobacco Never     Family History   Problem Relation Age of Onset    Alzheimer's disease  Mother     Schizophrenia Father     Bipolar disorder Father     Hypertension Father     No Known Problems Sister     No Known Problems Maternal Grandmother     No Known Problems Maternal Grandfather     No Known Problems Paternal Grandmother     No Known Problems Paternal Grandfather     No Known Problems Brother     No Known Problems Brother     Lymphoma Son 3        berkits lymphoma    No Known Problems Son     No Known Problems Son     No Known Problems Maternal Aunt     No Known Problems Maternal Aunt     No Known Problems Maternal Aunt     No Known Problems Paternal Aunt     No Known Problems Paternal Aunt     No Known Problems Paternal Aunt     No Known Problems Paternal Aunt        Meds/Allergies       Current Outpatient Medications:     donepezil (ARICEPT) 10 mg tablet    bisacodyl (DULCOLAX) 5 mg EC tablet    polyethylene glycol (GLYCOLAX) 17 GM/SCOOP powder    Allergies   Allergen Reactions    Nitrofurantoin Shortness Of Breath and Hallucinations     Other reaction(s): Unknown Reaction    Reglan [Metoclopramide] Shortness Of Breath    Benadryl [Diphenhydramine] Palpitations       Objective     /75   Pulse 61   Temp 97.5 °F (36.4 °C) (Temporal)   Resp 20   Wt 76.7 kg (169 lb)   SpO2 99%   BMI 35.32 kg/m²       PHYSICAL EXAM    Gen: NAD  Head: NCAT  CV: RRR  CHEST: Clear  ABD: soft, NT/ND  EXT: no edema      ASSESSMENT/PLAN:  This is a 54 y.o. year old female here for colonoscopy, and she is stable and optimized for her procedure.

## 2025-05-06 ENCOUNTER — RESULTS FOLLOW-UP (OUTPATIENT)
Dept: GASTROENTEROLOGY | Facility: CLINIC | Age: 55
End: 2025-05-06

## 2025-05-06 PROCEDURE — 88305 TISSUE EXAM BY PATHOLOGIST: CPT | Performed by: SPECIALIST

## 2025-05-06 NOTE — TELEPHONE ENCOUNTER
----- Message from Clari Milian MD sent at 5/6/2025  1:54 PM EDT -----  Colon polyp pathology revealing adenoma, which is pre-cancerous. Should repeat colonoscopy in 7 years.

## 2025-05-08 ENCOUNTER — OFFICE VISIT (OUTPATIENT)
Dept: FAMILY MEDICINE CLINIC | Facility: CLINIC | Age: 55
End: 2025-05-08
Payer: COMMERCIAL

## 2025-05-08 VITALS
DIASTOLIC BLOOD PRESSURE: 70 MMHG | OXYGEN SATURATION: 99 % | SYSTOLIC BLOOD PRESSURE: 102 MMHG | RESPIRATION RATE: 16 BRPM | BODY MASS INDEX: 35.48 KG/M2 | HEIGHT: 58 IN | TEMPERATURE: 97.9 F | WEIGHT: 169 LBS | HEART RATE: 68 BPM

## 2025-05-08 DIAGNOSIS — F03.B0 MODERATE DEMENTIA WITHOUT BEHAVIORAL DISTURBANCE, PSYCHOTIC DISTURBANCE, MOOD DISTURBANCE, OR ANXIETY, UNSPECIFIED DEMENTIA TYPE (HCC): ICD-10-CM

## 2025-05-08 DIAGNOSIS — F32.0 MILD MAJOR DEPRESSION, SINGLE EPISODE (HCC): ICD-10-CM

## 2025-05-08 DIAGNOSIS — Z12.31 ENCOUNTER FOR SCREENING MAMMOGRAM FOR BREAST CANCER: Primary | ICD-10-CM

## 2025-05-08 PROBLEM — F32.2 SEVERE MAJOR DEPRESSIVE DISORDER (HCC): Status: ACTIVE | Noted: 2025-05-08

## 2025-05-08 PROCEDURE — 99214 OFFICE O/P EST MOD 30 MIN: CPT | Performed by: FAMILY MEDICINE

## 2025-05-08 NOTE — ASSESSMENT & PLAN NOTE
Dementia.  Diagnosed with dementia, unrelated to occupational activities. Insurance should cover ongoing care and maintain benefits.  expressed concerns about discrimination, handled by their . Recommend establishing guardianship or power of  through a local .  Diagnostic plan: Previous labs including vitamin B12, RPR, thyroid, and urinalysis were normal. Repeat brain study and labs planned for September.  Follow-up: Follow-up with neurologist on 05/13/2025 for further management.  Orders:    CBC and differential; Future

## 2025-05-08 NOTE — PROGRESS NOTES
Name: Sania Dixon      : 1970      MRN: [id number]  Encounter Provider: Hank Velasco MD  Encounter Date: 2025   Encounter department: [facility]    Assessment & Plan  Moderate dementia without behavioral disturbance, psychotic disturbance, mood disturbance, or anxiety, unspecified dementia type (HCC)  Dementia.  Diagnosed with dementia, unrelated to occupational activities. Insurance should cover ongoing care and maintain benefits.  expressed concerns about discrimination, handled by their . Recommend establishing guardianship or power of  through a local .  Diagnostic plan: Previous labs including vitamin B12, RPR, thyroid, and urinalysis were normal. Repeat brain study and labs planned for September.  Follow-up: Follow-up with neurologist on 2025 for further management.  Orders:    CBC and differential; Future    Mild major depression, single episode (HCC)  Patient is not having any depression symptoms at this time.         Encounter for screening mammogram for breast cancer    Orders:    Mammo screening bilateral w 3d and cad; Future      Assessment & Plan                 Subjective     History of Present Illness  The patient presents for evaluation of dementia, accompanied by her .    She has memory issues, particularly with recent events, and has been unable to work since 2025 due to cognitive decline. Her  reports stable memory but struggles with short-term recall. She is scheduled to consult with neurologist Dr. Vazquez on 2025 at 3:15 PM. Her  is liaising with their  regarding discontinued Social Security benefits, alleging unjust decision and employer discrimination. He seeks compensation for emotional distress. He has adjusted his work schedule to provide care, with their son assisting in the afternoon and evening. Under the care of Dr. Tovar, a head examination  "revealed significant cerebral atrophy.    Results  Laboratory Studies  Vitamin B12, RPR, and thyroid tests were normal.    Imaging  Brain study showed enlarged lateral ventricles, suggestive of ex vacuo dilation and neurodegenerative damage.     Allergies   Allergen Reactions    Nitrofurantoin Shortness Of Breath and Hallucinations     Other reaction(s): Unknown Reaction    Reglan [Metoclopramide] Shortness Of Breath    Benadryl [Diphenhydramine] Palpitations     Patient Active Problem List   Diagnosis    Congenital absence of kidney, left    Dyspepsia    Fibrocystic disease of breast    Prediabetes    Obesity (BMI 35.0-39.9 without comorbidity)    Acute pain of right knee    Mild intermittent asthma without complication    Moderate mixed hyperlipidemia not requiring statin therapy    Menopause    Viral infection, unspecified    Positive screening for depression on 2-item Patient Health Questionnaire (PHQ-2)    Brain atrophy (HCC)    Moderate dementia (HCC)    Severe major depressive disorder (HCC)       Current Outpatient Medications:     donepezil (ARICEPT) 10 mg tablet, Take 1 tablet (10 mg total) by mouth daily at bedtime, Disp: 30 tablet, Rfl: 5    /70 (BP Location: Left arm, Patient Position: Sitting, Cuff Size: Standard)   Pulse 68   Temp 97.9 °F (36.6 °C) (Tympanic)   Resp 16   Ht 4' 10\" (1.473 m)   Wt 76.7 kg (169 lb)   SpO2 99%   BMI 35.32 kg/m²     Physical Exam  General Appearance: Normal.  HEENT: Within normal limits.  Respiratory: Lungs auscultated.  Cardiovascular: Heart has Normal rhythm without murmurs.  Lymphatic: No abnormal lymph nodes found.  Extremities: No edema in the LEs.  Skin: Warm and dry, no rash.  Neurological: very poor memory of event in past 3 months.  Not able to recall any story.                      "

## 2025-05-12 PROBLEM — R41.89 MODERATE COGNITIVE IMPAIRMENT: Status: ACTIVE | Noted: 2025-05-12

## 2025-05-13 ENCOUNTER — OFFICE VISIT (OUTPATIENT)
Dept: NEUROLOGY | Facility: CLINIC | Age: 55
End: 2025-05-13
Payer: COMMERCIAL

## 2025-05-13 VITALS
TEMPERATURE: 97.6 F | OXYGEN SATURATION: 98 % | DIASTOLIC BLOOD PRESSURE: 82 MMHG | HEART RATE: 65 BPM | SYSTOLIC BLOOD PRESSURE: 124 MMHG | BODY MASS INDEX: 35.35 KG/M2 | WEIGHT: 168.4 LBS | RESPIRATION RATE: 18 BRPM | HEIGHT: 58 IN

## 2025-05-13 DIAGNOSIS — R41.89 MODERATE COGNITIVE IMPAIRMENT: Primary | ICD-10-CM

## 2025-05-13 PROCEDURE — 99214 OFFICE O/P EST MOD 30 MIN: CPT | Performed by: PSYCHIATRY & NEUROLOGY

## 2025-05-13 RX ORDER — DONEPEZIL HYDROCHLORIDE 10 MG/1
10 TABLET, FILM COATED ORAL
Qty: 30 TABLET | Refills: 5 | Status: SHIPPED | OUTPATIENT
Start: 2025-05-13 | End: 2025-11-09

## 2025-05-13 RX ORDER — MEMANTINE HYDROCHLORIDE 10 MG/1
10 TABLET ORAL 2 TIMES DAILY
Qty: 60 TABLET | Refills: 5 | Status: SHIPPED | OUTPATIENT
Start: 2025-05-13

## 2025-05-13 NOTE — PATIENT INSTRUCTIONS
Plan:  Herbal supplement - Himalaya Organic Bacopa Juan Jose  Namenda 5 mg twice daily for 1 month, then 10 mg twice daily  Continue Aricept 10 mg daily at bedtime

## 2025-05-13 NOTE — PROGRESS NOTES
nName: Sania Dixon      : 1970      MRN: [id number]  Encounter Provider: Jose Vazquez DO  Encounter Date: 2025   Encounter department: [facility]  :  Assessment & Plan  Moderate cognitive impairment  Patient is a 54 year old female with a history of prediabetes, obesity, and hyperlipidemia who presents to the clinic today for follow-up and treatment of cognitive problems. Patient's neurological examination non-focal.    Given that patient's ability to have appropriate conversations has increased with the Aricept, we will add Namenda at this time.  Along with that, patient recommended a herbal supplement to augment patient's regimen.  Patient's family was agreeable to these medication changes.    We also had an extensive conversation regards to continuing stimulating activities and house such as word searches, crosswords, etc. we also discussed safety precautions such as not cooking in the house alone, no driving, etc.    As per the last MRI, repeat imaging was recommended in 6 months however, given that there is a familial component for patient's dementia, no further imaging is necessary.    Work-Up:  MRI Brain NeuroQuant w wo contrast 3/6/25: No acute infarct, significant mass effect or midline shift. No abnormal parenchymal enhancement. NeuroQuant analysis was performed: Enlarged inferior lateral and overall ventricular system, suggestive of global ex-vacuo dilatation.  The additional finding of an abnormal Hippocampal Occupancy Score, (HOC), is concerning for a mesial temporal lobe focused Neurodegenerative process.  Recommend reevaluation with Neuroquant imaging in 6 months.    Plan:  Continue Aricept 10 mg daily at bedtime  Start Namenda 5 mg twice daily for 1 month, then 10 mg twice daily  Start Herbal supplement - Himalaya Organic Bacopa Monnieri  Monitor for worsening symptoms  Call for any questions or concerns  The patient indicates understanding of these  issues and agrees with the plan.  This patient case was discussed with Dr. Fields.  Return in about 6 months (around 11/13/2025).    Orders:    donepezil (ARICEPT) 10 mg tablet; Take 1 tablet (10 mg total) by mouth daily at bedtime    memantine (Namenda) 10 mg tablet; Take 1 tablet (10 mg total) by mouth 2 (two) times a day Start with 5 mg twice daily for one month. Then, after 1 month, take 10 mg twice daily.      This note was completed in part utilizing Dragon Dictation Software. Grammatical errors, random word insertions, spelling mistakes, and incomplete sentences may be an occasional consequence of this system secondary to software limitations, ambient noise, and hardware issues.  If you have any questions or concerns about the content, text, or information contained within the body of this dictation, please contact the provider for clarification.      Patient Instructions   Plan:  Herbal supplement - Himalaya Organic Bacopa Monhectoreri  Namenda 5 mg twice daily for 1 month, then 10 mg twice daily  Continue Aricept 10 mg daily at bedtime       History of Present Illness   HPI     Patient is a 54 year old female with a history of prediabetes, obesity, and hyperlipidemia who presents to the clinic today for follow-up and treatment of cognitive problems. She is accompanied by spouse and son. Primary caregiver is patient. Patient lives with her family.     Since the last time the patient was seen in the office, the patient states that her memory has been the same. The patient has been labile with her mood but no aggression. Patient's family states that her memory is about the same but since being on the Aricept, patient is able to hold better conversations.  In addition, patient's family denies any hallucinations, vivid dreams, delusions, or aggression.  There have been no specific side effects that the patient's family is noted after the Aricept was started.    Patient's family has been trying to help her to  stimulate activities with crosswords and word searches.  Patient continues to take care of herself and cook for her family.  She is no longer driving or working.    There are no other questions or concerns at this time.    History from initial consult note on 2/18/25:  Description of memory trouble  When did the memory difficulties begin?  6 months  ago  The memory problems affects changes in short term memory, recalling words, and repetition of questions  Family has not noticed paranoia, suspiciousness, hallucinations, delusional thinking, agitation, gait trouble, and balance trouble   How did the symptoms start? gradual  Over time the problem has gradually worsening  Who noticed the problem with your memory? patient, friend, son, , and co-workers  What type of things do you forget? names  Have there been accidents, injuries, embarrassments related to the memory problem? No  Do you have any problems operating household appliance such as TV remote, kitchen appliances, computer? Yes - having trouble using the computer  Do you have trouble finding the right word while speaking? Yes  Do you substitute a wrong word? No  Do you require repeat information or ask the same question repeatedly? Yes     Functional Assessment  She is independent in the following: ambulation, bathing and hygiene, feeding, continence, grooming, toileting, and dressing  Requires assistance with the following: No assistance needed right now  Have you had any recent accidents, citations or getting lost in familiar places? Yes - driving to her [place] and forgot the directions, went into the exit for a store (going there was 10 years), [place] in the parking lot and speeding ticket (no tickets before)  Do you handle your own financial affairs such as balancing your checkbook, paying bills, investments? Yes - but having trouble with things now at work  Medication administration: patient self medicates     Associated mood symptoms  Have  you or your family noted any change in your mood or personality? Yes, been distant recently, been anxious, excessive sighing/yawning  Are you currently or have you been treated in the past for depression or anxiety? No     Other factors  Fluctuation in alertness? No  Is there any history of epilepsy? No  Has the patient lost personal awareness, and social awareness? No  Family member with dementia and what type? Yes - mother with AD, maybe brother and sister with memory changes (on-going evaluation)  Do you have history of myocardial infarction? No  Does patient have history of alcohol abuse? No     Review of Systems   Neurological:  Negative for dizziness, tremors, seizures, syncope, facial asymmetry, speech difficulty, weakness, light-headedness, numbness and headaches.        Memory changes   Psychiatric/Behavioral:  Negative for agitation and hallucinations.        Pertinent Medical History   Medical History Reviewed by provider this encounter:     .  Past Medical History   Past Medical History:   Diagnosis Date    Cholelithiasis     Chronic kidney disease     born with 1 kidney, right    Colon polyp     COVID-19 2020    GERD (gastroesophageal reflux disease)     H. pylori infection 09/2018    Kidney stone     Migraines     Obesity     Pre-diabetes      Past Surgical History:   Procedure Laterality Date    CHOLECYSTECTOMY      COLONOSCOPY      EGD AND COLONOSCOPY  09/2018    positive h pylori, colon polyp, repeat 5 yrs    HYSTERECTOMY      KNEE SURGERY Left     meniscus in her 40s    NJ COLONOSCOPY FLX DX W/COLLJ SPEC WHEN PFRMD N/A 09/12/2018    Procedure: EGD AND COLONOSCOPY;  Surgeon: Chang Wang MD;  Location: [facility] GI LAB;  Service: Gastroenterology    NJ CYSTO/URETERO W/LITHOTRIPSY &INDWELL STENT INSRT Right 02/16/2018    Procedure: CYSTOSCOPY RIGHT URETEROSCOPY WITH , RIGTH RETROGRADE PYELOGRAM AND IRIGHT URETERAL STENT EXCHANGE;  Surgeon: Slim Reyna MD;  Location: [facility] OR;  Service: Urology    NJ  CYSTOURETHROSCOPY W/URETERAL CATHETERIZATION Right 01/21/2018    Procedure: CYSTOSCOPY; BILATERAL RETROGRADE PYELOGRAM WITH INSERTION STENT RIGHT URETERAL;  Surgeon: Phuc Zelaya MD;  Location:  MAIN OR;  Service: Urology    NV LAPAROSCOPY SURG CHOLECYSTECTOMY N/A 10/30/2018    Procedure: LAPAROSCOPIC CHOLECYSTECTOMY;  Surgeon: Ye Ward DO;  Location:  MAIN OR;  Service: General     Family History   Problem Relation Age of Onset    Alzheimer's disease Mother     Schizophrenia Father     Bipolar disorder Father     Hypertension Father     No Known Problems Sister     No Known Problems Maternal Grandmother     No Known Problems Maternal Grandfather     No Known Problems Paternal Grandmother     No Known Problems Paternal Grandfather     No Known Problems Brother     No Known Problems Brother     Lymphoma Son 3        berkits lymphoma    No Known Problems Son     No Known Problems Son     No Known Problems Maternal Aunt     No Known Problems Maternal Aunt     No Known Problems Maternal Aunt     No Known Problems Paternal Aunt     No Known Problems Paternal Aunt     No Known Problems Paternal Aunt     No Known Problems Paternal Aunt       reports that she has never smoked. She has never used smokeless tobacco. She reports that she does not drink alcohol and does not use drugs.  Current Outpatient Medications   Medication Instructions    donepezil (ARICEPT) 10 mg, Oral, Daily at bedtime    memantine (NAMENDA) 10 mg, Oral, 2 times daily, Start with 5 mg twice daily for one month. Then, after 1 month, take 10 mg twice daily.     Allergies   Allergen Reactions    Nitrofurantoin Shortness Of Breath and Hallucinations     Other reaction(s): Unknown Reaction    Reglan [Metoclopramide] Shortness Of Breath    Benadryl [Diphenhydramine] Palpitations      Current Outpatient Medications on File Prior to Visit   Medication Sig Dispense Refill    [DISCONTINUED] donepezil (ARICEPT) 10 mg tablet Take 1 tablet (10 mg total)  "by mouth daily at bedtime 30 tablet 5     No current facility-administered medications on file prior to visit.      Social History     Tobacco Use    Smoking status: Never    Smokeless tobacco: Never   Vaping Use    Vaping status: Never Used   Substance and Sexual Activity    Alcohol use: No    Drug use: No    Sexual activity: Yes     Partners: Male        Objective   /82 (BP Location: Right arm, Patient Position: Sitting, Cuff Size: Standard)   Pulse 65   Temp 97.6 °F (36.4 °C) (Temporal)   Resp 18   Ht 4' 10\" (1.473 m)   Wt 76.4 kg (168 lb 6.4 oz)   SpO2 98%   BMI 35.20 kg/m²     Physical Exam  Vitals reviewed.   HENT:      Head: Normocephalic and atraumatic.      Mouth/Throat:      Mouth: Mucous membranes are moist.   Eyes:      General: Lids are normal.      Extraocular Movements: Extraocular movements intact.      Conjunctiva/sclera: Conjunctivae normal.      Pupils: Pupils are equal, round, and reactive to light.   Cardiovascular:      Rate and Rhythm: Normal rate.   Pulmonary:      Effort: Pulmonary effort is normal.   Musculoskeletal:         General: Normal range of motion.   Skin:     General: Skin is warm.   Neurological:      Mental Status: She is alert.      Motor: Motor strength is normal.     Coordination: Romberg sign negative.      Deep Tendon Reflexes:      Reflex Scores:       Tricep reflexes are 2+ on the right side and 2+ on the left side.       Bicep reflexes are 2+ on the right side and 2+ on the left side.       Brachioradialis reflexes are 2+ on the right side and 2+ on the left side.       Patellar reflexes are 2+ on the right side and 2+ on the left side.       Achilles reflexes are 2+ on the right side and 2+ on the left side.  Psychiatric:         Mood and Affect: Mood normal.         Speech: Speech normal.         Behavior: Behavior normal.       Neurological Exam  Mental Status  Alert. Oriented only to person, place and time. Patient stated that she was at [facility] but " was not able to tell me that she was here for a doctor's appointment. Speech is normal. Language is fluent with no aphasia.    Cranial Nerves  CN II: Visual fields full to confrontation.  CN III, IV, VI: Extraocular movements intact bilaterally. Normal lids and orbits bilaterally. Pupils equal round and reactive to light bilaterally.  CN V: Facial sensation is normal.  CN VII: Full and symmetric facial movement.  CN VIII: Hearing is normal.  CN IX, X: Palate elevates symmetrically. Normal gag reflex.  CN XI: Shoulder shrug strength is normal.  CN XII: Tongue midline without atrophy or fasciculations.    Motor  Normal muscle bulk throughout. No fasciculations present. Normal muscle tone. No abnormal involuntary movements. Strength is 5/5 throughout all four extremities.    Sensory  Light touch is normal in upper and lower extremities.     Reflexes                                            Right                      Left  Brachioradialis                    2+                         2+  Biceps                                 2+                         2+  Triceps                                2+                         2+  Patellar                                2+                         2+  Achilles                                2+                         2+    Coordination  Right: Finger-to-nose normal.Left: Finger-to-nose normal.    Gait  Casual gait is normal including stance, stride, and arm swing. Romberg is absent.      Radiology Results Review: I have reviewed radiology reports from PACS including: MRI brain.    Administrative Statements   I have spent a total time of 25 minutes in caring for this patient on the day of the visit/encounter including Impressions, Counseling / Coordination of care, Documenting in the medical record, Reviewing/placing orders in the medical record (including tests, medications, and/or procedures), and Obtaining or reviewing history  .

## 2025-05-13 NOTE — ASSESSMENT & PLAN NOTE
Patient is a 54 year old female with a history of prediabetes, obesity, and hyperlipidemia who presents to the clinic today for follow-up and treatment of cognitive problems. Patient's neurological examination non-focal.    Given that patient's ability to have appropriate conversations has increased with the Aricept, we will add Namenda at this time.  Along with that, patient recommended a herbal supplement to augment patient's regimen.  Patient's family was agreeable to these medication changes.    We also had an extensive conversation regards to continuing stimulating activities and house such as word searches, crosswords, etc. we also discussed safety precautions such as not cooking in the house alone, no driving, etc.    As per the last MRI, repeat imaging was recommended in 6 months however, given that there is a familial component for patient's dementia, no further imaging is necessary.    Work-Up:  MRI Brain NeuroQuant w wo contrast 3/6/25: No acute infarct, significant mass effect or midline shift. No abnormal parenchymal enhancement. NeuroQuant analysis was performed: Enlarged inferior lateral and overall ventricular system, suggestive of global ex-vacuo dilatation.  The additional finding of an abnormal Hippocampal Occupancy Score, (HOC), is concerning for a mesial temporal lobe focused Neurodegenerative process.  Recommend reevaluation with Neuroquant imaging in 6 months.    Plan:  Continue Aricept 10 mg daily at bedtime  Start Namenda 5 mg twice daily for 1 month, then 10 mg twice daily  Start Herbal supplement - Himalaya Organic Bacopa Monnieri  Monitor for worsening symptoms  Call for any questions or concerns  The patient indicates understanding of these issues and agrees with the plan.  This patient case was discussed with Dr. Fields.  Return in about 6 months (around 11/13/2025).    Orders:    donepezil (ARICEPT) 10 mg tablet; Take 1 tablet (10 mg total) by mouth daily at bedtime    memantine  (Namenda) 10 mg tablet; Take 1 tablet (10 mg total) by mouth 2 (two) times a day Start with 5 mg twice daily for one month. Then, after 1 month, take 10 mg twice daily.

## 2025-05-22 DIAGNOSIS — R41.89 MODERATE COGNITIVE IMPAIRMENT: ICD-10-CM

## 2025-05-22 NOTE — TELEPHONE ENCOUNTER
Patient does not have Donepezil he will contact [facility] to see if medication was picked up

## 2025-06-05 DIAGNOSIS — R41.89 MODERATE COGNITIVE IMPAIRMENT: ICD-10-CM

## 2025-06-05 RX ORDER — MEMANTINE HYDROCHLORIDE 10 MG/1
TABLET ORAL
Qty: 180 TABLET | Refills: 1 | Status: SHIPPED | OUTPATIENT
Start: 2025-06-05

## 2025-08-03 DIAGNOSIS — R41.89 MODERATE COGNITIVE IMPAIRMENT: ICD-10-CM

## 2025-08-04 RX ORDER — MEMANTINE HYDROCHLORIDE 10 MG/1
TABLET ORAL
Qty: 270 TABLET | Refills: 2 | OUTPATIENT
Start: 2025-08-04

## 2025-08-21 ENCOUNTER — OFFICE VISIT (OUTPATIENT)
Dept: FAMILY MEDICINE CLINIC | Facility: CLINIC | Age: 55
End: 2025-08-21

## 2025-08-21 VITALS
SYSTOLIC BLOOD PRESSURE: 130 MMHG | WEIGHT: 178 LBS | OXYGEN SATURATION: 98 % | TEMPERATURE: 98.1 F | BODY MASS INDEX: 37.36 KG/M2 | RESPIRATION RATE: 16 BRPM | DIASTOLIC BLOOD PRESSURE: 80 MMHG | HEIGHT: 58 IN | HEART RATE: 75 BPM

## 2025-08-21 DIAGNOSIS — F03.B0 MODERATE DEMENTIA WITHOUT BEHAVIORAL DISTURBANCE, PSYCHOTIC DISTURBANCE, MOOD DISTURBANCE, OR ANXIETY, UNSPECIFIED DEMENTIA TYPE (HCC): Primary | ICD-10-CM

## 2025-08-21 DIAGNOSIS — F43.25 ADJUSTMENT DISORDER WITH MIXED DISTURBANCE OF EMOTIONS AND CONDUCT: ICD-10-CM

## 2025-08-21 PROCEDURE — 99214 OFFICE O/P EST MOD 30 MIN: CPT | Performed by: FAMILY MEDICINE

## (undated) DEVICE — STERILE POLYISOPRENE POWDER-FREE SURGICAL GLOVES: Brand: PROTEXIS

## (undated) DEVICE — GAUZE SPONGES,16 PLY: Brand: CURITY

## (undated) DEVICE — 3000CC GUARDIAN II: Brand: GUARDIAN

## (undated) DEVICE — PLASTIC ADHESIVE BANDAGE: Brand: CURITY

## (undated) DEVICE — SPECIMEN CONTAINER STERILE PEEL PACK

## (undated) DEVICE — APPLIER INT CLIP ENDO TI 10MM MED LRG LIGAT PSTL GRP

## (undated) DEVICE — 3M™ STERI-STRIP™ COMPOUND BENZOIN TINCTURE 40 BAGS/CARTON 4 CARTONS/CASE C1544: Brand: 3M™ STERI-STRIP™

## (undated) DEVICE — TUBING SET W. FILTER, GAS FLOW 30 L/MIN: Brand: N.A.

## (undated) DEVICE — STERILE POLYISOPRENE POWDER-FREE SURGICAL GLOVES WITH EMOLLIENT COATING: Brand: PROTEXIS

## (undated) DEVICE — INVIEW CLEAR LEGGINGS: Brand: CONVERTORS

## (undated) DEVICE — STANDARD SURGICAL GOWN, L: Brand: CONVERTORS

## (undated) DEVICE — SHEATH URETERAL ACCESS 10/12FR 45CM PROXIS

## (undated) DEVICE — FLEXIBLE ADHESIVE BANDAGE,X-LARGE: Brand: CURITY

## (undated) DEVICE — PROXIMATE PLUS MD MULTI-DIRECTIONAL RELEASE SKIN STAPLERS CONTAINS 35 STAINLESS STEEL STAPLES APPROXIMATE CLOSED DIMENSIONS: 6.9MM X 3.9MM WIDE: Brand: PROXIMATE

## (undated) DEVICE — STERILE CYSTO PACK: Brand: CARDINAL HEALTH

## (undated) DEVICE — GUIDEWIRE STRGHT TIP 0.035 IN  SOLO PLUS

## (undated) DEVICE — INTENDED FOR TISSUE SEPARATION, AND OTHER PROCEDURES THAT REQUIRE A SHARP SURGICAL BLADE TO PUNCTURE OR CUT.: Brand: BARD-PARKER SAFETY BLADES SIZE 15, STERILE

## (undated) DEVICE — GLOVE SRG BIOGEL 7.5

## (undated) DEVICE — Device: Brand: OMNICLOSE TROCAR SITE CLOSURE DEVICE

## (undated) DEVICE — NEEDLE BLUNT 18 G X 1 1/2IN

## (undated) DEVICE — SWITCH BLADE TIP CURVED METZ

## (undated) DEVICE — SCD SEQUENTIAL COMPRESSION COMFORT SLEEVE MEDIUM KNEE LENGTH: Brand: KENDALL SCD

## (undated) DEVICE — INFLATION DEVICE 6FR X 4CM

## (undated) DEVICE — BLADED TROCAR WITH SMOOTH CANNULA: Brand: VERSAONE

## (undated) DEVICE — SURGICAL GOWN, XL SMARTSLEEVE: Brand: CONVERTORS

## (undated) DEVICE — SUT VICRYL 2-0 SH 27 IN UNDYED J417H

## (undated) DEVICE — LUBRICANT SURGILUBE TUBE 4 OZ  FLIP TOP

## (undated) DEVICE — IRRIG ENDO FLO TUBING

## (undated) DEVICE — INTENDED FOR TISSUE SEPARATION, AND OTHER PROCEDURES THAT REQUIRE A SHARP SURGICAL BLADE TO PUNCTURE OR CUT.: Brand: BARD-PARKER SAFETY BLADES SIZE 11, STERILE

## (undated) DEVICE — SUT PROLENE 3-0 SH 36 IN 8522H

## (undated) DEVICE — ENDOPOUCH RETRIEVER SPECIMEN RETRIEVAL BAGS: Brand: ENDOPOUCH RETRIEVER

## (undated) DEVICE — INSUFFLATION NEEDLE: Brand: SURGINEEDLE

## (undated) DEVICE — GARMENT,MEDLINE,DVT,INT,CALF,FOAM,MED: Brand: MEDLINE

## (undated) DEVICE — SYRINGE 30ML LL

## (undated) DEVICE — OCCLUSIVE GAUZE STRIP,3% BISMUTH TRIBROMOPHENATE IN PETROLATUM BLEND: Brand: XEROFORM

## (undated) DEVICE — PAD GROUNDING ADULT

## (undated) DEVICE — ALLENTOWN LAP CHOLE APP PACK: Brand: CARDINAL HEALTH

## (undated) DEVICE — TUBING SUCTION 5MM X 12 FT

## (undated) DEVICE — 4-PORT MANIFOLD: Brand: NEPTUNE 2

## (undated) DEVICE — CHLORAPREP HI-LITE 26ML ORANGE

## (undated) DEVICE — PACK TUR

## (undated) DEVICE — UROCATCH BAG

## (undated) DEVICE — CATH URETERAL 5FR X 70 CM FLEX TIP POLYUR BARD

## (undated) DEVICE — SUT ETHILON 3-0 PS-1 18 IN 1663H

## (undated) DEVICE — OPTICAL TROCAR: Brand: VISIPORT PLUS

## (undated) DEVICE — ENDOSCOPIC VALVE WITH ADAPTER.: Brand: SURSEAL® II